# Patient Record
Sex: MALE | Race: BLACK OR AFRICAN AMERICAN | Employment: OTHER | URBAN - METROPOLITAN AREA
[De-identification: names, ages, dates, MRNs, and addresses within clinical notes are randomized per-mention and may not be internally consistent; named-entity substitution may affect disease eponyms.]

---

## 2017-01-12 ENCOUNTER — OFFICE VISIT (OUTPATIENT)
Dept: ORTHOPEDIC SURGERY | Age: 82
End: 2017-01-12

## 2017-01-12 VITALS
HEART RATE: 56 BPM | HEIGHT: 71 IN | DIASTOLIC BLOOD PRESSURE: 47 MMHG | WEIGHT: 163 LBS | BODY MASS INDEX: 22.82 KG/M2 | OXYGEN SATURATION: 96 % | RESPIRATION RATE: 16 BRPM | SYSTOLIC BLOOD PRESSURE: 115 MMHG | TEMPERATURE: 98.1 F

## 2017-01-12 DIAGNOSIS — M48.061 SPINAL STENOSIS OF LUMBAR REGION: Primary | Chronic | ICD-10-CM

## 2017-01-12 RX ORDER — NAPROXEN 500 MG/1
500 TABLET ORAL 2 TIMES DAILY WITH MEALS
Qty: 60 TAB | Refills: 2 | Status: SHIPPED | OUTPATIENT
Start: 2017-01-12 | End: 2017-04-20 | Stop reason: SDUPTHER

## 2017-01-12 RX ORDER — GABAPENTIN 100 MG/1
100 CAPSULE ORAL 3 TIMES DAILY
Qty: 90 CAP | Refills: 2 | Status: ON HOLD | OUTPATIENT
Start: 2017-01-12 | End: 2018-02-16

## 2017-01-12 NOTE — PROGRESS NOTES
VORB ENTERED PER DR. Manjit Styles AS DOCUMENTED ON BLUE SHEET:NAPROXEN 500 MG 1 TAB PO BID WITH FOOD PRN PAIN #60 RF 2; GABAPENTIN 100 MG TID #90 RF 2.

## 2017-01-12 NOTE — PROGRESS NOTES
[address], [address]  [place]  Phone: [phone]  Fax: [phone]        Ronit Weaver  : 1934  PCP: Serene Mixon MD    PROGRESS NOTE      ASSESSMENT AND PLAN    Belkis Crocker was seen today for back pain. Diagnoses and all orders for this visit:    Spinal stenosis of lumbar region    Other orders  -     gabapentin (NEURONTIN) 100 mg capsule; Take 1 Cap by mouth three (3) times daily.  -     naproxen (NAPROSYN) 500 mg tablet; Take 1 Tab by mouth two (2) times daily (with meals). 1. Trial of Naprosyn. 2. Continue Gabapentin. May take TID if needed. 3. No indication for RICKY at present. .   4. Given care instructions for stenosis. Follow-up Disposition:  Return in about 3 months (around 2017). HISTORY OF PRESENT ILLNESS  Sierra Webster is a 80 y.o. male. Pt presents to the office for a f/u visit for spinal stenosis. Last visit he was given a trial of Gabapentin. He states that he has benefit from Gabapentin. He denies any negative side effects with Gabapentin. His back pain has been doing much better since his last office visit. He notes that he is able to ambulate fine if he leans forward. He is unsure of his standing tolerance. Pt denies any pain shooting into his BLE. He denies any paresthesias in his BLE. He denies any saddle paresthesia. Pt takes Gabapentin 100 mg BID with benefit. Can feel it wearing off during the day. Is unsure if he has ever been on Naprosyn. Pt takes Motrin qD without much benefit. Pt takes Tramadol 50 mg qD. Denies persistent fevers, chills, weight changes, neurogenic bowel or bladder symptoms. Pt denies recent ED visits or hospitalizations. Pt works in maintenance at [employer].      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 Vince Dale Pr foot/toes surgery proc unlisted      Pr total hip arthroplasty Left 6/2015   . MEDICATIONS    Current Outpatient Prescriptions   Medication Sig Dispense Refill    ibuprofen (MOTRIN) 600 mg tablet Take 1 Tab by mouth two (2) times daily as needed for Pain. 60 Tab 2    gabapentin (NEURONTIN) 100 mg capsule 1 tab PO QHS x 5 days then increase to 1 tab PO BID 60 Cap 2    traMADol (ULTRAM) 50 mg tablet Take 1 Tab by mouth every eight (8) hours as needed for Pain. Max Daily Amount: 150 mg. 60 Tab 1    tamsulosin (FLOMAX) 0.4 mg capsule Take 1 Cap by mouth daily. 90 Cap 3    amLODIPine-benazepril (LOTREL) 10-20 mg per capsule Take 1 Cap by mouth daily.  gabapentin (NEURONTIN) 100 mg capsule Take  by mouth three (3) times daily. ALLERGIES  No Known Allergies       SOCIAL HISTORY    Social History     Social History    Marital status:      Spouse name: N/A    Number of children: N/A    Years of education: N/A     Occupational History    Not on file.      Social History Main Topics    Smoking status: Former Smoker     Packs/day: 1.00     Years: 25.00     Quit date: 6/1/1990    Smokeless tobacco: Never Used    Alcohol use 0.5 oz/week     1 Shots of liquor per week    Drug use: No    Sexual activity: Not on file     Other Topics Concern    Not on file     Social History Narrative       FAMILY HISTORY  Family History   Problem Relation Age of Onset    Family history unknown: Yes       REVIEW OF SYSTEMS  Review of Systems   Constitutional: Negative for chills, fever and weight loss. Respiratory: Negative for shortness of breath. Cardiovascular: Negative for chest pain. Gastrointestinal: Negative for constipation. Negative for fecal incontinence   Genitourinary: Negative for dysuria. Negative for urinary incontinence   Musculoskeletal:        Per HPI   Skin: Negative for rash. Neurological: Negative for dizziness, tingling, tremors, focal weakness and headaches. Endo/Heme/Allergies: Does not bruise/bleed easily. Psychiatric/Behavioral: The patient does not have insomnia. PHYSICAL EXAMINATION  Visit Vitals    /47    Pulse (!) 56    Temp 98.1 °F (36.7 °C) (Oral)    Resp 16    Ht 5' 11\" (1.803 m)    Wt 163 lb (73.9 kg)    SpO2 96%    BMI 22.73 kg/m2         Accompanied by self. Constitutional:  Well developed, well nourished, in no acute distress. Psychiatric: Affect and mood are appropriate. Integumentary: No rashes or abrasions noted on exposed areas. Cardiovascular/Peripheral Vascular: Intact l pulses. No peripheral edema is noted. Lymphatic:  No evidence of lymphedema. No cervical lymphadenopathy. SPINE/MUSCULOSKELETAL EXAM      Lumbar spine:  No rash, ecchymosis, or gross obliquity. No fasciculations. No focal atrophy is noted. Tenderness to palpation L4-5. No tenderness to palpation at the sciatic notch. SI joints non-tender. Trochanters non tender. Sensation grossly intact to light touch. Positive shopping cart sign. MOTOR:       Hip Flex  Quads Hamstrings Ankle DF EHL Ankle PF   Right +4/5 +4/5 +4/5 +4/5 +4/5 +4/5   Left +4/5 +4/5 +4/5 +4/5 +4/5 +4/5       Straight Leg raise negative. Hamstring tightness bilaterally. Ambulation without assistive device. FWB. Ambulation forward flexed.        Written by Annie Ramos, as dictated by Karyle Pringle, MD.    Mr. Triny Fischer may have a reminder for a \"due or due soon\" health maintenance. I have asked that he contact his primary care provider for follow-up on this health maintenance.

## 2017-01-12 NOTE — PATIENT INSTRUCTIONS
CosmosID Activation    Thank you for requesting access to CosmosID. Please follow the instructions below to securely access and download your online medical record. CosmosID allows you to send messages to your doctor, view your test results, renew your prescriptions, schedule appointments, and more. How Do I Sign Up? 1. In your internet browser, go to [email or web address]  2. Click on the First Time User? Click Here link in the Sign In box. You will be redirect to the New Member Sign Up page. 3. Enter your CosmosID Access Code exactly as it appears below. You will not need to use this code after youve completed the sign-up process. If you do not sign up before the expiration date, you must request a new code. CosmosID Access Code: [email or web address]  Expires: 2017 10:23 AM (This is the date your CosmosID access code will )    4. Enter the last four digits of your Social Security Number (xxxx) and Date of Birth (mm/dd/yyyy) as indicated and click Submit. You will be taken to the next sign-up page. 5. Create a CosmosID ID. This will be your CosmosID login ID and cannot be changed, so think of one that is secure and easy to remember. 6. Create a CosmosID password. You can change your password at any time. 7. Enter your Password Reset Question and Answer. This can be used at a later time if you forget your password. 8. Enter your e-mail address. You will receive e-mail notification when new information is available in [address] 9. Click Sign Up. You can now view and download portions of your medical record. 10. Click the Download Summary menu link to download a portable copy of your medical information. Additional Information    If you have questions, please visit the Frequently Asked Questions section of the CosmosID website at [email or web address] Remember, CosmosID is NOT to be used for urgent needs. For medical emergencies, dial 911.          Lumbar Stenosis: Care Instructions  Your Care Instructions    Stenosis in the spine is a narrowing of the canal that is around the spinal cord and nerve roots in your back. It can happen as part of aging. Sometimes bone and other tissue grow into this canal and press on the spinal nerves. This can cause pain, numbness, and weakness. When it happens in the lower part of your back, it is called lumbar stenosis. Lumbar stenosis can cause problems in the legs, feet, and rear end (buttocks). You may be able to relieve symptoms of lumbar stenosis with pain medicine. Your doctor may suggest physical therapy and exercises to keep your spine strong and flexible. Some people try cortisone shots to reduce swelling. If pain and numbness in your legs are still so bad that you cannot do your normal activities, you may need surgery. Follow-up care is a key part of your treatment and safety. Be sure to make and go to all appointments, and call your doctor if you are having problems. It's also a good idea to know your test results and keep a list of the medicines you take. How can you care for yourself at home? · Take an over-the-counter pain medicine, such as acetaminophen (Tylenol), ibuprofen (Advil, Motrin), or naproxen (Aleve). Read and follow all instructions on the label. · Do not take two or more pain medicines at the same time unless the doctor told you to. Many pain medicines have acetaminophen, which is Tylenol. Too much acetaminophen (Tylenol) can be harmful. · Stay at a healthy weight. Being overweight puts extra strain on your spine. · Change positions often when you sit or stand. This can ease pain. For example, lean forward. This may reduce pressure on the spinal cord and its nerves. · Avoid doing things that make your symptoms worse. Walking downhill and standing for a long time may cause pain. · Stretch and strengthen your back muscles as your doctor or physical therapist recommends.  If your doctor says it is okay to do them, these exercises may help. ¨ Lie on your back with your knees bent. Gently pull one bent knee to your chest. Put that foot back on the floor, and then pull the other knee to your chest.  ¨ Do pelvic tilts. Lie on your back with your knees bent. Tighten your stomach muscles. Pull your belly button (navel) in and up toward your ribs. You should feel like your back is pressing to the floor and your hips and pelvis are slightly lifting off the floor. Hold for 6 seconds while breathing smoothly. ¨ Stand with your back flat against a wall. Slowly slide down until your knees are slightly bent. Hold for 10 seconds, then slide back up the wall. · Remove or change anything in your house that may cause you to fall. Keep walkways clear of clutter, electrical cords, and throw rugs. When should you call for help? Call 911 anytime you think you may need emergency care. For example, call if:  · You are unable to move a leg at all. Call your doctor now or seek immediate medical care if:  · You have new or worse symptoms in your legs, belly, or buttocks. Symptoms may include:  ¨ Numbness or tingling. ¨ Weakness. ¨ Pain. · You lose bladder or bowel control. Watch closely for changes in your health, and be sure to contact your doctor if:  · You are not getting better as expected. Where can you learn more? Go to http://em-gus.info/. Dow Sandhoff in the search box to learn more about \"Lumbar Stenosis: Care Instructions. \"  Current as of: May 23, 2016  Content Version: 11.1  © 3751-9719 [employer]. Care instructions adapted under license by [employer] (which disclaims liability or warranty for this information). If you have questions about a medical condition or this instruction, always ask your healthcare professional. [place] [address] any warranty or liability for your use of this information.

## 2017-01-12 NOTE — MR AVS SNAPSHOT
Visit Information Date & Time Provider Department Dept. Phone Encounter #  
 1/12/2017 10:50 AM Bryan Velarde MD [facility] [phone] [id number] Follow-up Instructions Return in about 3 months (around 4/12/2017). Your Appointments 6/23/2017  9:00 AM  
ESTABLISHED PATIENT with Nora Loomis MD  
[facility] Appt Note: annual  
 [address] 
[address] [place] [address]  
[address] [place] [address] Upcoming Health Maintenance Date Due DTaP/Tdap/Td series (1 - Tdap) 1/27/1955 ZOSTER VACCINE AGE 60> 1/27/1994 GLAUCOMA SCREENING Q2Y 1/27/1999 Pneumococcal 65+ Low/Medium Risk (1 of 2 - PCV13) 1/27/1999 MEDICARE YEARLY EXAM 1/27/1999 INFLUENZA AGE 9 TO ADULT 8/1/2016 Allergies as of 1/12/2017  Review Complete On: 1/12/2017 By: Bryan Velarde MD  
 No Known Allergies Current Immunizations  Never Reviewed No immunizations on file. Not reviewed this visit You Were Diagnosed With   
  
 Codes Comments Spinal stenosis of lumbar region    -  Primary ICD-10-CM: M48.06 
ICD-9-CM: 724.02 Vitals BP Pulse Temp Resp Height(growth percentile) Weight(growth percentile) 115/47 (!) 56 98.1 °F (36.7 °C) (Oral) 16 5' 11\" (1.803 m) 163 lb (73.9 kg) SpO2 BMI Smoking Status 96% 22.73 kg/m2 Former Smoker BMI and BSA Data Body Mass Index Body Surface Area  
 22.73 kg/m 2 1.92 m 2 Preferred Pharmacy Pharmacy Name Phone [address], [address] [address] Your Updated Medication List  
  
   
This list is accurate as of: 1/12/17 11:49 AM.  Always use your most recent med list.  
  
  
  
  
 * gabapentin 100 mg capsule Commonly known as:  NEURONTIN Take  by mouth three (3) times daily. * gabapentin 100 mg capsule Commonly known as:  NEURONTIN  
1 tab PO QHS x 5 days then increase to 1 tab PO BID  
  
 ibuprofen 600 mg tablet Commonly known as:  MOTRIN Take 1 Tab by mouth two (2) times daily as needed for Pain. LOTREL 10-20 mg per capsule Generic drug:  amLODIPine-benazepril Take 1 Cap by mouth daily. tamsulosin 0.4 mg capsule Commonly known as:  FLOMAX Take 1 Cap by mouth daily. traMADol 50 mg tablet Commonly known as:  ULTRAM  
Take 1 Tab by mouth every eight (8) hours as needed for Pain. Max Daily Amount: 150 mg.  
  
 * Notice: This list has 2 medication(s) that are the same as other medications prescribed for you. Read the directions carefully, and ask your doctor or other care provider to review them with you. Follow-up Instructions Return in about 3 months (around 2017). Patient Instructions [employer] Activation Thank you for requesting access to [employer]. Please follow the instructions below to securely access and download your online medical record. [employer] allows you to send messages to your doctor, view your test results, renew your prescriptions, schedule appointments, and more. How Do I Sign Up? 1. In your internet browser, go to [email or web address] 
2. Click on the First Time User? Click Here link in the Sign In box. You will be redirect to the New Member Sign Up page. 3. Enter your [employer] Access Code exactly as it appears below. You will not need to use this code after youve completed the sign-up process. If you do not sign up before the expiration date, you must request a new code. [employer] Access Code: [email or web address] Expires: 2017 10:23 AM (This is the date your [employer] access code will ) 4. Enter the last four digits of your Social Security Number (xxxx) and Date of Birth (mm/dd/yyyy) as indicated and click Submit. You will be taken to the next sign-up page. 5. Create a Access Closuret ID. This will be your Azooo login ID and cannot be changed, so think of one that is secure and easy to remember. 6. Create a Azooo password. You can change your password at any time. 7. Enter your Password Reset Question and Answer. This can be used at a later time if you forget your password. 8. Enter your e-mail address. You will receive e-mail notification when new information is available in [address] 9. Click Sign Up. You can now view and download portions of your medical record. 10. Click the Download Summary menu link to download a portable copy of your medical information. Additional Information If you have questions, please visit the Frequently Asked Questions section of the Azooo website at [email or web address] [email or web address]/. Remember, Azooo is NOT to be used for urgent needs. For medical emergencies, dial 911. Lumbar Stenosis: Care Instructions Your Care Instructions Stenosis in the spine is a narrowing of the canal that is around the spinal cord and nerve roots in your back. It can happen as part of aging. Sometimes bone and other tissue grow into this canal and press on the spinal nerves. This can cause pain, numbness, and weakness. When it happens in the lower part of your back, it is called lumbar stenosis. Lumbar stenosis can cause problems in the legs, feet, and rear end (buttocks). You may be able to relieve symptoms of lumbar stenosis with pain medicine. Your doctor may suggest physical therapy and exercises to keep your spine strong and flexible. Some people try cortisone shots to reduce swelling. If pain and numbness in your legs are still so bad that you cannot do your normal activities, you may need surgery. Follow-up care is a key part of your treatment and safety. Be sure to make and go to all appointments, and call your doctor if you are having problems.  It's also a good idea to know your test results and keep a list of the medicines you take. How can you care for yourself at home? · Take an over-the-counter pain medicine, such as acetaminophen (Tylenol), ibuprofen (Advil, Motrin), or naproxen (Aleve). Read and follow all instructions on the label. · Do not take two or more pain medicines at the same time unless the doctor told you to. Many pain medicines have acetaminophen, which is Tylenol. Too much acetaminophen (Tylenol) can be harmful. · Stay at a healthy weight. Being overweight puts extra strain on your spine. · Change positions often when you sit or stand. This can ease pain. For example, lean forward. This may reduce pressure on the spinal cord and its nerves. · Avoid doing things that make your symptoms worse. Walking downhill and standing for a long time may cause pain. · Stretch and strengthen your back muscles as your doctor or physical therapist recommends. If your doctor says it is okay to do them, these exercises may help. ¨ Lie on your back with your knees bent. Gently pull one bent knee to your chest. Put that foot back on the floor, and then pull the other knee to your chest. 
¨ Do pelvic tilts. Lie on your back with your knees bent. Tighten your stomach muscles. Pull your belly button (navel) in and up toward your ribs. You should feel like your back is pressing to the floor and your hips and pelvis are slightly lifting off the floor. Hold for 6 seconds while breathing smoothly. ¨ Stand with your back flat against a wall. Slowly slide down until your knees are slightly bent. Hold for 10 seconds, then slide back up the wall. · Remove or change anything in your house that may cause you to fall. Keep walkways clear of clutter, electrical cords, and throw rugs. When should you call for help? Call 911 anytime you think you may need emergency care. For example, call if: 
· You are unable to move a leg at all. Call your doctor now or seek immediate medical care if: · You have new or worse symptoms in your legs, belly, or buttocks. Symptoms may include: ¨ Numbness or tingling. ¨ Weakness. ¨ Pain. · You lose bladder or bowel control. Watch closely for changes in your health, and be sure to contact your doctor if: 
· You are not getting better as expected. Where can you learn more? Go to http://em-gus.info/. Vernice Dance in the search box to learn more about \"Lumbar Stenosis: Care Instructions. \" Current as of: May 23, 2016 Content Version: 11.1 © 4359-6785 DealitLive.[employer]. Care instructions adapted under license by [employer] (which disclaims liability or warranty for this information). If you have questions about a medical condition or this instruction, always ask your healthcare professional. [address] any warranty or liability for your use of this information. Introducing [facility]! Edgar Roca introduces Digital Folio patient portal. Now you can access parts of your medical record, email your doctor's office, and request medication refills online. 1. In your internet browser, go to [email or web address] 2. Click on the First Time User? Click Here link in the Sign In box. You will see the New Member Sign Up page. 3. Enter your Digital Folio Access Code exactly as it appears below. You will not need to use this code after youve completed the sign-up process. If you do not sign up before the expiration date, you must request a new code. · Digital Folio Access Code: [email or web address] Expires: 4/12/2017 10:23 AM 
 
4. Enter the last four digits of your Social Security Number (xxxx) and Date of Birth (mm/dd/yyyy) as indicated and click Submit. You will be taken to the next sign-up page. 5. Create a Digital Folio ID. This will be your Digital Folio login ID and cannot be changed, so think of one that is secure and easy to remember. 6. Create a LMN-1 password. You can change your password at any time. 7. Enter your Password Reset Question and Answer. This can be used at a later time if you forget your password. 8. Enter your e-mail address. You will receive e-mail notification when new information is available in [address] 9. Click Sign Up. You can now view and download portions of your medical record. 10. Click the Download Summary menu link to download a portable copy of your medical information. If you have questions, please visit the Frequently Asked Questions section of the LMN-1 website. Remember, LMN-1 is NOT to be used for urgent needs. For medical emergencies, dial 911. Now available from your iPhone and Android! Please provide this summary of care documentation to your next provider. Your primary care clinician is listed as [facility]. If you have any questions after today's visit, please call [phone].

## 2017-04-13 RX ORDER — TRAMADOL HYDROCHLORIDE 50 MG/1
50 TABLET ORAL
Qty: 60 TAB | Refills: 1 | OUTPATIENT
Start: 2017-04-13 | End: 2017-07-25 | Stop reason: SDUPTHER

## 2017-04-13 NOTE — TELEPHONE ENCOUNTER
PHONE IN RX    Last Visit: 08/26/2016 with TINA Mccullough    Next Appointment: 05/05/2017 with TINA Mccullough   Previous Refill Encounters: 12/07/2016 per TINA Mccullough #[id number] with 1 refill     Requested Prescriptions     Pending Prescriptions Disp Refills    traMADol (ULTRAM) 50 mg tablet 60 Tab 1     Sig: Take 1 Tab by mouth every eight (8) hours as needed for Pain. Max Daily Amount: 150 mg.

## 2017-04-13 NOTE — TELEPHONE ENCOUNTER
Pt came by  office to req a refill of tramadol.   [facility] and call pt to advise

## 2017-04-20 ENCOUNTER — OFFICE VISIT (OUTPATIENT)
Dept: ORTHOPEDIC SURGERY | Age: 82
End: 2017-04-20

## 2017-04-20 VITALS
SYSTOLIC BLOOD PRESSURE: 118 MMHG | HEIGHT: 71 IN | BODY MASS INDEX: 22.73 KG/M2 | TEMPERATURE: 98.3 F | RESPIRATION RATE: 18 BRPM | OXYGEN SATURATION: 97 % | DIASTOLIC BLOOD PRESSURE: 70 MMHG | WEIGHT: 162.4 LBS | HEART RATE: 72 BPM

## 2017-04-20 DIAGNOSIS — M48.061 SPINAL STENOSIS OF LUMBAR REGION: Primary | Chronic | ICD-10-CM

## 2017-04-20 RX ORDER — NAPROXEN 500 MG/1
500 TABLET ORAL 2 TIMES DAILY WITH MEALS
Qty: 60 TAB | Refills: 2 | Status: SHIPPED | OUTPATIENT
Start: 2017-04-20 | End: 2017-10-31 | Stop reason: SDUPTHER

## 2017-04-20 NOTE — MR AVS SNAPSHOT
Visit Information Date & Time Provider Department Dept. Phone Encounter #  
 4/20/2017  3:10 PM Dilma Barajas  [facility], [address] [facility] [phone] [id number] Follow-up Instructions Return in about 3 months (around 7/20/2017) for routine med f/u. Your Appointments 5/5/2017  9:40 AM  
Follow Up with Laila Hummel PA-C  
[facility] ([facility]) Appt Note: bilat hip fu  
 [address], [address] [facility]  
[phone] [address] [facility], [facility] [address]  
  
    
 7/20/2017  9:45 AM  
Follow Up with Dilma Barajas MD  
[facility]) Appt Note: 3mo fu  
 [address] [facility] [address]  
[address] [address]  
  
    
  
 6/22/2017 10:45 AM  
Any with Cesar Weiner MD  
[facility] ([facility]) Appt Note: annual  
 [address] 
[address] [facility] [address]  
[address] [facility] [address] [facility] [address] [facility] [address] Upcoming Health Maintenance Date Due DTaP/Tdap/Td series (1 - Tdap) 1/27/1955 ZOSTER VACCINE AGE 60> 1/27/1994 GLAUCOMA SCREENING Q2Y 1/27/1999 Pneumococcal 65+ Low/Medium Risk (1 of 2 - PCV13) 1/27/1999 MEDICARE YEARLY EXAM 1/27/1999 INFLUENZA AGE 9 TO ADULT 8/1/2016 Allergies as of 4/20/2017  Review Complete On: 4/20/2017 By: [address], MD  
 No Known Allergies Current Immunizations  Never Reviewed No immunizations on file. Not reviewed this visit You Were Diagnosed With   
  
 Codes Comments Spinal stenosis of lumbar region    -  Primary ICD-10-CM: M48.06 
ICD-9-CM: 724.02 Vitals BP Pulse Temp Resp Height(growth percentile) Weight(growth percentile) 118/70 72 98.3 °F (36.8 °C) (Oral) 18 5' 11\" (1.803 m) 162 lb 6.4 oz (73.7 kg) SpO2 BMI Smoking Status 97% 22.65 kg/m2 Former Smoker BMI and BSA Data Body Mass Index Body Surface Area  
 22.65 kg/m 2 1.92 m 2 Preferred Pharmacy Pharmacy Name Phone [phone] [facility], [phone] Lambert Your Updated Medication List  
  
   
This list is accurate as of: 4/20/17  3:41 PM.  Always use your most recent med list.  
  
  
  
  
 * gabapentin 100 mg capsule Commonly known as:  NEURONTIN Take  by mouth three (3) times daily. * gabapentin 100 mg capsule Commonly known as:  NEURONTIN Take 1 Cap by mouth three (3) times daily. LOTREL 10-20 mg per capsule Generic drug:  amLODIPine-benazepril Take 1 Cap by mouth daily. naproxen 500 mg tablet Commonly known as:  NAPROSYN Take 1 Tab by mouth two (2) times daily (with meals). tamsulosin 0.4 mg capsule Commonly known as:  FLOMAX Take 1 Cap by mouth daily. traMADol 50 mg tablet Commonly known as:  ULTRAM  
Take 1 Tab by mouth every eight (8) hours as needed for Pain. Max Daily Amount: 150 mg.  
  
 * Notice: This list has 2 medication(s) that are the same as other medications prescribed for you. Read the directions carefully, and ask your doctor or other care provider to review them with you. Prescriptions Sent to Pharmacy Refills  
 naproxen (NAPROSYN) 500 mg tablet 2 Sig: Take 1 Tab by mouth two (2) times daily (with meals). Class: Normal  
 Pharmacy: [id number] [facility], [address] [facility] #: [phone] Route: Oral  
  
Follow-up Instructions Return in about 3 months (around 7/20/2017) for routine med f/u. Patient Instructions Hamstring Strain: Rehab Exercises Your Care Instructions Here are some examples of typical rehabilitation exercises for your condition. Start each exercise slowly. Ease off the exercise if you start to have pain. Your doctor or physical therapist will tell you when you can start these exercises and which ones will work best for you. How to do the exercises Hamstring set (heel dig) 1. Sit with your affected leg bent. Your good leg should be straight and supported on the floor. 2. Tighten the muscles on the back of your bent leg (hamstring) by pressing your heel into the floor. 3. Hold for about 6 seconds, and then rest for up to 10 seconds. 4. Repeat 8 to 12 times. Hamstring curl 1. Lie on your stomach with your knees straight. Place a pillow under your stomach. If your kneecap is uncomfortable, roll up a washcloth and put it under your leg just above your kneecap. 2. Lift the foot of your affected leg by bending your knee so that you bring your foot up toward your buttock. If this motion hurts, try it without bending your knee quite as far. This may help you avoid any painful motion. 3. Slowly move your leg up and down. 4. Repeat 8 to 12 times. When you can do this exercise with ease and no pain, add some resistance. To do this: · Tie the ends of an exercise band together to form a loop. Attach one end of the loop to a secure object or shut a door on it to hold it in place. (Or you can have someone hold one end of the loop to provide resistance.) · Loop the other end of the exercise band around the lower part of your affected leg. · Repeat steps 1 through 4, slowly pulling back on the exercise band with your leg. Hip extension 1. Stand facing a wall with your hands on the wall at about chest level. 2. Keeping the knee of your affected leg straight, kick that leg straight back behind you. 3. Relax, and lower your leg back to the starting position. 4. Repeat 8 to 12 times. When you can do this exercise with ease and no pain, add some resistance. To do this: · Tie the ends of an exercise band together to form a loop. Attach one end of the loop to a secure object or shut a door on it to hold it in place. (Or you can have someone hold one end of the loop to provide resistance.) · Loop the other end of the exercise band around the lower part of your affected leg. · Repeat steps 1 through 4, slowly pulling back on the exercise band with your leg. Hamstring wall stretch 1. Lie on your back in a doorway, with your good leg through the open door. 2. Slide your affected leg up the wall to straighten your knee. You should feel a gentle stretch down the back of your leg. ¨ Do not arch your back. ¨ Do not bend either knee. ¨ Keep one heel touching the floor and the other heel touching the wall. Do not point your toes. 3. Hold the stretch for at least 1 minute to begin. Then try to lengthen the time you hold the stretch to as long as 6 minutes. 4. Repeat 2 to 4 times. If you do not have a place to do this exercise in a doorway, there is another way to do it: 1. Lie on your back, and bend the knee of your affected leg. 2. Loop a towel under the ball and toes of that foot, and hold the ends of the towel in your hands. 3. Straighten your knee, and slowly pull back on the towel. You should feel a gentle stretch down the back of your leg. 4. Hold the stretch for 15 to 30 seconds. Or even better, hold the stretch for 1 minute if you can. 5. Repeat 2 to 4 times. Calf stretch 1. Stand facing a wall with your hands on the wall at about eye level. Put your affected leg about a step behind your other leg. 2. Keeping your back leg straight and your back heel on the floor, bend your front knee and gently bring your hip and chest toward the wall until you feel a stretch in the calf of your back leg. 3. Hold the stretch for 15 to 30 seconds. 4. Repeat 2 to 4 times. 5. Repeat steps 1 through 4, but this time keep your back knee bent. Single-leg balance 1. Stand on a flat surface with your arms stretched out to your sides like you are making the letter \"T. \" Then lift your good leg off the floor, bending it at the knee. If you are not steady on your feet, use one hand to hold on to a chair, counter, or wall. 2. Standing on your affected leg, keep that knee straight. Try to balance on that leg for up to 30 seconds. Then rest for up to 10 seconds. 3. Repeat 6 to 8 times. 4. When you can balance on your affected leg for 30 seconds with your eyes open, try to balance on it with your eyes closed. When you can do this exercise with your eyes closed for 30 seconds and with ease and no pain, try standing on a pillow or piece of foam, and repeat steps 1 through 4. Follow-up care is a key part of your treatment and safety. Be sure to make and go to all appointments, and call your doctor if you are having problems. It's also a good idea to know your test results and keep a list of the medicines you take. Where can you learn more? Go to http://em-gus.info/. Enter [phone] in the search box to learn more about \"Hamstring Strain: Rehab Exercises. \" Current as of: May 23, 2016 Content Version: 11.2 © 1121-2523 [employer]. Care instructions adapted under license by [employer] (which disclaims liability or warranty for this information). If you have questions about a medical condition or this instruction, always ask your healthcare professional. [place] [address] any warranty or liability for your use of this information. Introducing [facility]! [employer] introduces [employer] patient portal. Now you can access parts of your medical record, email your doctor's office, and request medication refills online. 1. In your internet browser, go to [email or web address] 2. Click on the First Time User? Click Here link in the Sign In box. You will see the New Member Sign Up page. 3. Enter your Visuu Access Code exactly as it appears below. You will not need to use this code after youve completed the sign-up process. If you do not sign up before the expiration date, you must request a new code. · Visuu Access Code: [email or web address] Expires: 7/19/2017  3:41 PM 
 
4. Enter the last four digits of your Social Security Number (xxxx) and Date of Birth (mm/dd/yyyy) as indicated and click Submit. You will be taken to the next sign-up page. 5. Create a Visuu ID. This will be your Visuu login ID and cannot be changed, so think of one that is secure and easy to remember. 6. Create a Visuu password. You can change your password at any time. 7. Enter your Password Reset Question and Answer. This can be used at a later time if you forget your password. 8. Enter your e-mail address. You will receive e-mail notification when new information is available in [address] 9. Click Sign Up. You can now view and download portions of your medical record. 10. Click the Download Summary menu link to download a portable copy of your medical information. If you have questions, please visit the Frequently Asked Questions section of the Visuu website. Remember, Visuu is NOT to be used for urgent needs. For medical emergencies, dial 911. Now available from your iPhone and Android! Please provide this summary of care documentation to your next provider. Your primary care clinician is listed as [facility]. If you have any questions after today's visit, please call [phone].

## 2017-04-20 NOTE — PROGRESS NOTES
[address]  [place] [address]  Phone: [phone]  Fax: [phone]        Kole Sheridan  : 1934  PCP: Sanchez Sharma MD    PROGRESS NOTE      ASSESSMENT AND PLAN    Delmar Sánchez was seen today for back pain. Diagnoses and all orders for this visit:    Spinal stenosis of lumbar region  -     naproxen (NAPROSYN) 500 mg tablet; Take 1 Tab by mouth two (2) times daily (with meals). 1. Continue Naprosyn (or Ibuprofen-NOT BOTH)  and Gabapentin. 2. No indications for surgery or injections at this time. 3. Advised to stay active as tolerated. 4. Given home exercises. Follow-up Disposition:  Return in about 3 months (around 2017) for routine med f/u. HISTORY OF PRESENT ILLNESS  Cy Trinidad is a 80 y.o. male. Pt presents to the office for a f/u visit for back pain, stenosis. Last visit he was given a trial of Naprosyn. Pt notes that he has benefit from Naprosyn. He takes Naprosyn every day without GI upset. Also taking IBu. Pt continues to have back pain. He states that he usually does well with his back pain until he walks and stands. He notices that he will be hunched forward after standing and walking. No saddle paresthesia. Pt takes Gabapentin QAM without somnolence. He takes Tramadol 50 mg QAM. Denies persistent fevers, chills, weight changes, neurogenic bowel or bladder symptoms. Pt denies recent ED visits or hospitalizations. Pt works in maintenance at [employer]      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 Bridget Crisostomo Left 6/2015   . MEDICATIONS    Current Outpatient Prescriptions   Medication Sig Dispense Refill    traMADol (ULTRAM) 50 mg tablet Take 1 Tab by mouth every eight (8) hours as needed for Pain. Max Daily Amount: 150 mg. 60 Tab 1    gabapentin (NEURONTIN) 100 mg capsule Take 1 Cap by mouth three (3) times daily. 90 Cap 2    ibuprofen (MOTRIN) 600 mg tablet Take 1 Tab by mouth two (2) times daily as needed for Pain. 60 Tab 2    tamsulosin (FLOMAX) 0.4 mg capsule Take 1 Cap by mouth daily. 90 Cap 3    amLODIPine-benazepril (LOTREL) 10-20 mg per capsule Take 1 Cap by mouth daily.  naproxen (NAPROSYN) 500 mg tablet Take 1 Tab by mouth two (2) times daily (with meals). 60 Tab 2    gabapentin (NEURONTIN) 100 mg capsule Take  by mouth three (3) times daily. ALLERGIES  No Known Allergies       SOCIAL HISTORY    Social History     Social History    Marital status:      Spouse name: N/A    Number of children: N/A    Years of education: N/A     Occupational History    Not on file. Social History Main Topics    Smoking status: Former Smoker     Packs/day: 1.00     Years: 25.00     Quit date: 6/1/1990    Smokeless tobacco: Never Used    Alcohol use 0.5 oz/week     1 Shots of liquor per week    Drug use: No    Sexual activity: Not on file     Other Topics Concern    Not on file     Social History Narrative       FAMILY HISTORY  Family History   Problem Relation Age of Onset    Family history unknown: Yes       REVIEW OF SYSTEMS  Review of Systems   Constitutional: Negative for chills, fever and weight loss. Respiratory: Negative for shortness of breath. Cardiovascular: Negative for chest pain. Gastrointestinal: Negative for constipation. Negative for fecal incontinence   Genitourinary: Negative for dysuria. Negative for urinary incontinence   Musculoskeletal:        Per HPI   Skin: Negative for rash. Neurological: Negative for dizziness, tingling, tremors, focal weakness and headaches. Endo/Heme/Allergies: Does not bruise/bleed easily. Psychiatric/Behavioral: The patient does not have insomnia. PHYSICAL EXAMINATION  Visit Vitals    /70    Pulse 72    Temp 98.3 °F (36.8 °C) (Oral)    Resp 18    Ht 5' 11\" (1.803 m)    Wt 162 lb 6.4 oz (73.7 kg)    SpO2 97%    BMI 22.65 kg/m2         Accompanied by self. Constitutional:  Well developed, well nourished, in no acute distress. Psychiatric: Affect and mood are appropriate. Integumentary: No rashes or abrasions noted on exposed areas. Cardiovascular/Peripheral Vascular: Intact l pulses. No peripheral edema is noted. Lymphatic:  No evidence of lymphedema. No cervical lymphadenopathy. SPINE/MUSCULOSKELETAL EXAM      Lumbar spine:  No rash, ecchymosis, or gross obliquity. No fasciculations. No focal atrophy is noted. Tenderness to palpation L5-S1. No tenderness to palpation at the sciatic notch. SI joints non-tender. Trochanters non tender. Sensation grossly intact to light touch. MOTOR:       Hip Flex  Quads Hamstrings Ankle DF EHL Ankle PF   Right +4/5 +4/5 +4/5 +4/5 +4/5 +4/5   Left +4/5 +4/5 +4/5 +4/5 +4/5 +4/5       Straight Leg raise negative. Hamstring tightness bilaterally. Ambulation without assistive device. FWB. Shuffling gait. Forward flexed. Written by Xavier Anderson, as dictated by Megan Sharma MD.    I, Dr. Megan Sharma MD, confirm that all documentation is accurate. Mr. Alexis Moura may have a reminder for a \"due or due soon\" health maintenance. I have asked that he contact his primary care provider for follow-up on this health maintenance.

## 2017-04-20 NOTE — PATIENT INSTRUCTIONS
Hamstring Strain: Rehab Exercises  Your Care Instructions  Here are some examples of typical rehabilitation exercises for your condition. Start each exercise slowly. Ease off the exercise if you start to have pain. Your doctor or physical therapist will tell you when you can start these exercises and which ones will work best for you. How to do the exercises  Hamstring set (heel dig)    1. Sit with your affected leg bent. Your good leg should be straight and supported on the floor. 2. Tighten the muscles on the back of your bent leg (hamstring) by pressing your heel into the floor. 3. Hold for about 6 seconds, and then rest for up to 10 seconds. 4. Repeat 8 to 12 times. Hamstring curl    1. Lie on your stomach with your knees straight. Place a pillow under your stomach. If your kneecap is uncomfortable, roll up a washcloth and put it under your leg just above your kneecap. 2. Lift the foot of your affected leg by bending your knee so that you bring your foot up toward your buttock. If this motion hurts, try it without bending your knee quite as far. This may help you avoid any painful motion. 3. Slowly move your leg up and down. 4. Repeat 8 to 12 times. When you can do this exercise with ease and no pain, add some resistance. To do this:  · Tie the ends of an exercise band together to form a loop. Attach one end of the loop to a secure object or shut a door on it to hold it in place. (Or you can have someone hold one end of the loop to provide resistance.)  · Loop the other end of the exercise band around the lower part of your affected leg. · Repeat steps 1 through 4, slowly pulling back on the exercise band with your leg. Hip extension    1. Stand facing a wall with your hands on the wall at about chest level. 2. Keeping the knee of your affected leg straight, kick that leg straight back behind you. 3. Relax, and lower your leg back to the starting position. 4. Repeat 8 to 12 times.   When you can do this exercise with ease and no pain, add some resistance. To do this:  · Tie the ends of an exercise band together to form a loop. Attach one end of the loop to a secure object or shut a door on it to hold it in place. (Or you can have someone hold one end of the loop to provide resistance.)  · Loop the other end of the exercise band around the lower part of your affected leg. · Repeat steps 1 through 4, slowly pulling back on the exercise band with your leg. Hamstring wall stretch    1. Lie on your back in a doorway, with your good leg through the open door. 2. Slide your affected leg up the wall to straighten your knee. You should feel a gentle stretch down the back of your leg. ¨ Do not arch your back. ¨ Do not bend either knee. ¨ Keep one heel touching the floor and the other heel touching the wall. Do not point your toes. 3. Hold the stretch for at least 1 minute to begin. Then try to lengthen the time you hold the stretch to as long as 6 minutes. 4. Repeat 2 to 4 times. If you do not have a place to do this exercise in a doorway, there is another way to do it:  1. Lie on your back, and bend the knee of your affected leg. 2. Loop a towel under the ball and toes of that foot, and hold the ends of the towel in your hands. 3. Straighten your knee, and slowly pull back on the towel. You should feel a gentle stretch down the back of your leg. 4. Hold the stretch for 15 to 30 seconds. Or even better, hold the stretch for 1 minute if you can. 5. Repeat 2 to 4 times. Calf stretch    1. Stand facing a wall with your hands on the wall at about eye level. Put your affected leg about a step behind your other leg. 2. Keeping your back leg straight and your back heel on the floor, bend your front knee and gently bring your hip and chest toward the wall until you feel a stretch in the calf of your back leg. 3. Hold the stretch for 15 to 30 seconds. 4. Repeat 2 to 4 times.   5. Repeat steps 1 through 4, but this time keep your back knee bent. Single-leg balance    1. Stand on a flat surface with your arms stretched out to your sides like you are making the letter \"T. \" Then lift your good leg off the floor, bending it at the knee. If you are not steady on your feet, use one hand to hold on to a chair, counter, or wall. 2. Standing on your affected leg, keep that knee straight. Try to balance on that leg for up to 30 seconds. Then rest for up to 10 seconds. 3. Repeat 6 to 8 times. 4. When you can balance on your affected leg for 30 seconds with your eyes open, try to balance on it with your eyes closed. When you can do this exercise with your eyes closed for 30 seconds and with ease and no pain, try standing on a pillow or piece of foam, and repeat steps 1 through 4. Follow-up care is a key part of your treatment and safety. Be sure to make and go to all appointments, and call your doctor if you are having problems. It's also a good idea to know your test results and keep a list of the medicines you take. Where can you learn more? Go to http://em-gus.info/. Enter [id number] in the search box to learn more about \"Hamstring Strain: Rehab Exercises. \"  Current as of: May 23, 2016  Content Version: 11.2  © 4754-8590 [employer]. Care instructions adapted under license by [employer] (which disclaims liability or warranty for this information). If you have questions about a medical condition or this instruction, always ask your healthcare professional. [place] [address] any warranty or liability for your use of this information.

## 2017-05-05 ENCOUNTER — OFFICE VISIT (OUTPATIENT)
Dept: ORTHOPEDIC SURGERY | Age: 82
End: 2017-05-05

## 2017-05-05 VITALS
WEIGHT: 163.6 LBS | HEART RATE: 60 BPM | BODY MASS INDEX: 22.9 KG/M2 | HEIGHT: 71 IN | TEMPERATURE: 97.6 F | SYSTOLIC BLOOD PRESSURE: 138 MMHG | DIASTOLIC BLOOD PRESSURE: 59 MMHG

## 2017-05-05 DIAGNOSIS — M70.61 TROCHANTERIC BURSITIS OF RIGHT HIP: ICD-10-CM

## 2017-05-05 DIAGNOSIS — Z96.642 HISTORY OF TOTAL LEFT HIP REPLACEMENT: Primary | ICD-10-CM

## 2017-05-05 DIAGNOSIS — Z96.641 HISTORY OF TOTAL RIGHT HIP REPLACEMENT: ICD-10-CM

## 2017-05-05 RX ORDER — BETAMETHASONE SODIUM PHOSPHATE AND BETAMETHASONE ACETATE 3; 3 MG/ML; MG/ML
6 INJECTION, SUSPENSION INTRA-ARTICULAR; INTRALESIONAL; INTRAMUSCULAR; SOFT TISSUE ONCE
Qty: 1 ML | Refills: 0 | Status: CANCELLED
Start: 2017-05-05 | End: 2017-05-05

## 2017-05-05 RX ORDER — DICLOFENAC SODIUM 10 MG/G
4 GEL TOPICAL 4 TIMES DAILY
Qty: 5 EACH | Refills: 0 | Status: SHIPPED | OUTPATIENT
Start: 2017-05-05 | End: 2017-09-07 | Stop reason: SDUPTHER

## 2017-05-05 NOTE — PROGRESS NOTES
[address] [facility], [address] [facility]  [phone]           Patient: Rito Mercer                MRN: [id number]       SSN: [id number]  YOB: 1934        AGE: 80 y.o. SEX: male  Body mass index is 22.82 kg/(m^2). PCP: Vivien Goff MD  05/05/17      This office note has been dictated. REVIEW OF SYSTEMS:  Constitutional: Negative for fever, chills, weight loss and malaise/fatigue. HENT: Negative. Eyes: Negative. Respiratory: Negative. Cardiovascular: Negative. Gastrointestinal: No bowel incontinence or constipation. Genitourinary: No bladder incontinence or saddle anesthesia. Skin: Negative. Neurological: Negative. Endo/Heme/Allergies: Negative. Psychiatric/Behavioral: Negative. Musculoskeletal: As per HPI above. Past Medical History:   Diagnosis Date    Arthritis     Chronic pain     left hip and lower back    Hypertension     Left hip pain     resolved since surgery    Thromboembolus ([facility] [address] 1975    brain, [facility]    Wears glasses          Current Outpatient Prescriptions:     naproxen (NAPROSYN) 500 mg tablet, Take 1 Tab by mouth two (2) times daily (with meals). , Disp: 60 Tab, Rfl: 2    traMADol (ULTRAM) 50 mg tablet, Take 1 Tab by mouth every eight (8) hours as needed for Pain. Max Daily Amount: 150 mg., Disp: 60 Tab, Rfl: 1    gabapentin (NEURONTIN) 100 mg capsule, Take 1 Cap by mouth three (3) times daily. , Disp: 90 Cap, Rfl: 2    tamsulosin (FLOMAX) 0.4 mg capsule, Take 1 Cap by mouth daily. , Disp: 90 Cap, Rfl: 3    amLODIPine-benazepril (LOTREL) 10-20 mg per capsule, Take 1 Cap by mouth daily. , Disp: , Rfl:     gabapentin (NEURONTIN) 100 mg capsule, Take  by mouth three (3) times daily. , Disp: , Rfl:     No Known Allergies    Social History     Social History    Marital status:      Spouse name: N/A    Number of children: N/A    Years of education: N/A     Occupational History    Not on file. Social History Main Topics    Smoking status: Former Smoker     Packs/day: 1.00     Years: 25.00     Quit date: 6/1/1990    Smokeless tobacco: Never Used    Alcohol use 0.5 oz/week     1 Shots of liquor per week    Drug use: No    Sexual activity: Not on file     Other Topics Concern    Not on file     Social History Narrative       Past Surgical History:   Procedure Laterality Date    FOOT/TOES SURGERY PROC UNLISTED      HX OTHER SURGICAL  1975    brain surgery, Dr. Markie Mora Left 6/2015               We did see Mr. Alexis Moura in the office today for followup in regards to his hips. He is having a little bit of discomfort, laterally based, on the right hip.  he does have a history of bilateral hip replacements. He has done very well with his hips. He denies any groin pain and has no thigh pain. He does reports a little bit of discomfort when he is up and ambulating and a little bit of discomfort when he lays on his right side at night. He is using no assistive devices for ambulation. PHYSICAL EXAMINATION: In general, the patient is alert and oriented x 3 and is in no acute distress. The patient is well-developed and well-nourished with a normal affect. The patient is afebrile. HEENT:  Head is normocephalic and atraumatic. Pupils are equally round and reactive to light and accommodation. Extraocular eye movements are intact. Neck is supple. Trachea is midline. No JVD is present. Breathing is nonlabored. Examination of the lower extremities reveals good range of motion of the hips without reproduction of his symptoms. The surgical wounds are healed nicely. He does have a little discomfort to palpation of the trochanteric bursa on the right side but none on the left. Strength is symmetric bilaterally in to the lower extremities. Abduction strength has come along very nicely.      RADIOGRAPHS:  Review of radiographs show total knee components to be well-fixed. He does have impaction allograft at the acetabulum bilaterally, which is incorporated nicely. ASSESSMENT:      1. Bilateral hip replacements doing well. 2. Trochanteric bursitis of the right hip. PLAN:  At this point, we discussed treatment options. We did discuss the possibility of injections, as well as physical therapy. He declines. we will see him back in about three months time for evaluation at which point we will repeat the x-rays of the hips. He will call with any questions or concerns that shall arise.              JR Fidel ROSA, PA-C, ATC

## 2017-06-02 ENCOUNTER — OFFICE VISIT (OUTPATIENT)
Dept: ORTHOPEDIC SURGERY | Age: 82
End: 2017-06-02

## 2017-06-02 VITALS
BODY MASS INDEX: 24.44 KG/M2 | HEIGHT: 69 IN | HEART RATE: 64 BPM | SYSTOLIC BLOOD PRESSURE: 135 MMHG | TEMPERATURE: 96.8 F | WEIGHT: 165 LBS | DIASTOLIC BLOOD PRESSURE: 59 MMHG

## 2017-06-02 DIAGNOSIS — M54.5 LOW BACK PAIN, UNSPECIFIED BACK PAIN LATERALITY, UNSPECIFIED CHRONICITY, WITH SCIATICA PRESENCE UNSPECIFIED: Primary | ICD-10-CM

## 2017-06-02 RX ORDER — CYCLOBENZAPRINE HCL 10 MG
10 TABLET ORAL
Qty: 60 TAB | Refills: 1 | Status: SHIPPED | OUTPATIENT
Start: 2017-06-02 | End: 2017-09-09 | Stop reason: SDUPTHER

## 2017-06-02 RX ORDER — METHYLPREDNISOLONE 4 MG/1
TABLET ORAL
Qty: 1 DOSE PACK | Refills: 0 | Status: SHIPPED | OUTPATIENT
Start: 2017-06-02 | End: 2018-02-18

## 2017-06-15 ENCOUNTER — HOSPITAL ENCOUNTER (OUTPATIENT)
Age: 82
Discharge: HOME | End: 2017-06-15
Attending: PHYSICIAN ASSISTANT
Payer: MEDICARE

## 2017-06-15 DIAGNOSIS — M54.5 LOW BACK PAIN, UNSPECIFIED BACK PAIN LATERALITY, UNSPECIFIED CHRONICITY, WITH SCIATICA PRESENCE UNSPECIFIED: ICD-10-CM

## 2017-06-15 PROCEDURE — 72148 MRI LUMBAR SPINE W/O DYE: CPT

## 2017-07-21 ENCOUNTER — HOSPITAL ENCOUNTER (OUTPATIENT)
Dept: ULTRASOUND IMAGING | Age: 82
Discharge: HOME | End: 2017-07-21
Attending: UROLOGY
Payer: MEDICARE

## 2017-07-21 DIAGNOSIS — R33.9 RETENTION OF URINE, UNSPECIFIED: ICD-10-CM

## 2017-07-21 PROCEDURE — 76770 US EXAM ABDO BACK WALL COMP: CPT

## 2017-07-26 RX ORDER — TRAMADOL HYDROCHLORIDE 50 MG/1
50 TABLET ORAL
Qty: 30 TAB | Refills: 0 | Status: SHIPPED | OUTPATIENT
Start: 2017-07-26 | End: 2018-02-18

## 2017-07-26 NOTE — TELEPHONE ENCOUNTER
Called and spoke with patient. Made him aware his rx was called into the [facility] on file.

## 2017-09-07 ENCOUNTER — OFFICE VISIT (OUTPATIENT)
Dept: ORTHOPEDIC SURGERY | Age: 82
End: 2017-09-07

## 2017-09-07 VITALS
OXYGEN SATURATION: 100 % | SYSTOLIC BLOOD PRESSURE: 126 MMHG | WEIGHT: 165 LBS | DIASTOLIC BLOOD PRESSURE: 56 MMHG | TEMPERATURE: 96.9 F | BODY MASS INDEX: 24.44 KG/M2 | HEART RATE: 72 BPM | HEIGHT: 69 IN

## 2017-09-07 DIAGNOSIS — Z96.642 HISTORY OF TOTAL LEFT HIP REPLACEMENT: ICD-10-CM

## 2017-09-07 DIAGNOSIS — M54.50 CHRONIC BILATERAL LOW BACK PAIN WITHOUT SCIATICA: ICD-10-CM

## 2017-09-07 DIAGNOSIS — G89.29 CHRONIC BILATERAL LOW BACK PAIN WITHOUT SCIATICA: ICD-10-CM

## 2017-09-07 DIAGNOSIS — M25.552 BILATERAL HIP PAIN: Primary | ICD-10-CM

## 2017-09-07 DIAGNOSIS — Z96.641 HISTORY OF TOTAL RIGHT HIP REPLACEMENT: ICD-10-CM

## 2017-09-07 DIAGNOSIS — Z96.643 HISTORY OF BILATERAL HIP REPLACEMENTS: ICD-10-CM

## 2017-09-07 DIAGNOSIS — M25.551 BILATERAL HIP PAIN: Primary | ICD-10-CM

## 2017-09-07 RX ORDER — DICLOFENAC SODIUM 10 MG/G
4 GEL TOPICAL 4 TIMES DAILY
Qty: 100 G | Refills: 5 | Status: SHIPPED | OUTPATIENT
Start: 2017-09-07 | End: 2018-02-18

## 2017-09-07 NOTE — PROGRESS NOTES
Patient: Sarah Kimbrough                MRN: [id number]       SSN: [id number]  YOB: 1934        AGE: 80 y.o. SEX: male  Body mass index is 24.37 kg/(m^2). PCP: Vashti Marrufo MD  09/07/17    HISTORY:  I had the pleasure of reviewing Wesley Jensen. He is a very delightful gentleman. We reconstructed both of his hips a couple of years ago. He had very stiff hips, protrusio. He needed bone grafting and acetabular reconstructions and overall has done quite well. My [de-identified] assistant quite astutely ordered an MRI. He was having radicular pain going all the way down to the foot hurting him if he stood at the kitchen sink for any period of time, having to go sit down. He has no groin pain and no thigh pain. He has a little bit of pain on the right lateral hip. When I first met him, he was on a walker, and now he walks unassisted but kyphotic. PHYSICAL EXAMINATION:  On examination today, he is walking kyphotic without antalgic component to the gait. There is perhaps just slight Trendelenburg on the right side. Both hips rotate well. He is tender over the right greater trochanters, which is just mild. The low back is actually the most tender area. The knee has decreased sensation to L4-5 laterally but worse on the right than on the left. There is no foot dorp. EHL is -5/5. MRI confirms quite severe stenosis and arthritis. There is also some fairly severe nerve root compression. PLAN:  I am going to send him to The [address] [place] [address]. With regards to his hip, I have offered to inject it. He would like to try some Voltaren cream, and then we will see him back in followup afterwards. It has been a pleasure to share in his care. He actually has had, overall, very good reconstruction of his hips. He was extremely debilitated when we first met him. I would be very happy to inject his right hip for trochanteric bursitis when he would like.           REVIEW OF SYSTEMS: CON: negative for weight loss, fever  EYE: negative for double vision  ENT: negative for hoarseness  RS:   negative for Tb  GI:    negative for blood in stool  :  negative for blood in urine  Other systems reviewed and noted below. Past Medical History:   Diagnosis Date    Arthritis     Chronic pain     left hip and lower back    Hypertension     Left hip pain     resolved since surgery    Thromboembolus ([address] 1975    brain, [facility] Wears glasses        Family History   Problem Relation Age of Onset    Family history unknown: Yes       Current Outpatient Prescriptions   Medication Sig Dispense Refill    traMADol (ULTRAM) 50 mg tablet Take 1 Tab by mouth every six (6) hours as needed for Pain. Max Daily Amount: 200 mg. 30 Tab 0    ferrous sulfate 325 mg (65 mg iron) tablet take 1 tablet by mouth once daily  0    tamsulosin (FLOMAX) 0.4 mg capsule Take 1 Cap by mouth daily. 90 Cap 3    cyclobenzaprine (FLEXERIL) 10 mg tablet Take 1 Tab by mouth three (3) times daily as needed for Muscle Spasm(s). 60 Tab 1    diclofenac (VOLTAREN) 1 % gel Apply 4 g to affected area four (4) times daily. 5 Each 0    naproxen (NAPROSYN) 500 mg tablet Take 1 Tab by mouth two (2) times daily (with meals). 60 Tab 2    gabapentin (NEURONTIN) 100 mg capsule Take 1 Cap by mouth three (3) times daily. 90 Cap 2    gabapentin (NEURONTIN) 100 mg capsule Take  by mouth three (3) times daily.  amLODIPine-benazepril (LOTREL) 10-20 mg per capsule Take 1 Cap by mouth daily.       methylPREDNISolone (MEDROL DOSEPACK) 4 mg tablet Per dose pack instructions 1 Dose Pack 0       No Known Allergies    Past Surgical History:   Procedure Laterality Date    FOOT/TOES SURGERY PROC UNLISTED      HX HIP REPLACEMENT      HX OTHER SURGICAL  1975    brain surgery, Dr. Carole Forte Left 6/2015       Social History     Social History    Marital status:      Spouse name: N/A    Number of children: N/A  Years of education: N/A     Occupational History    Not on file. Social History Main Topics    Smoking status: Former Smoker     Packs/day: 1.00     Years: 25.00     Quit date: 6/1/1990    Smokeless tobacco: Never Used    Alcohol use 0.5 oz/week     1 Shots of liquor per week    Drug use: No    Sexual activity: Not on file     Other Topics Concern    Not on file     Social History Narrative       Visit Vitals    /56 (BP 1 Location: Left arm, BP Patient Position: Sitting)    Pulse 72    Temp 96.9 °F (36.1 °C) (Oral)    Ht 5' 9\" (1.753 m)    Wt 165 lb (74.8 kg)    SpO2 100%    BMI 24.37 kg/m2         PHYSICAL EXAMINATION:  GENERAL: Alert and oriented x3, in no acute distress, well-developed, well-nourished, afebrile. HEART: No JVD. EYES: No scleral icterus   NECK: No significant lymphadenopathy   LUNGS: No respiratory compromise or indrawing  ABDOMEN: Soft, non-tender, non-distended. Electronically signed by:  Braulio Carney MD

## 2017-09-11 RX ORDER — CYCLOBENZAPRINE HCL 10 MG
TABLET ORAL
Qty: 60 TAB | Refills: 1 | Status: SHIPPED | OUTPATIENT
Start: 2017-09-11 | End: 2018-09-06

## 2017-10-12 ENCOUNTER — OFFICE VISIT (OUTPATIENT)
Dept: ORTHOPEDIC SURGERY | Age: 82
End: 2017-10-12

## 2017-10-12 VITALS
TEMPERATURE: 98.5 F | DIASTOLIC BLOOD PRESSURE: 54 MMHG | SYSTOLIC BLOOD PRESSURE: 124 MMHG | OXYGEN SATURATION: 99 % | RESPIRATION RATE: 16 BRPM | WEIGHT: 160.8 LBS | HEIGHT: 69 IN | BODY MASS INDEX: 23.82 KG/M2 | HEART RATE: 61 BPM

## 2017-10-12 DIAGNOSIS — M71.30 SYNOVIAL CYST: Primary | ICD-10-CM

## 2017-10-12 DIAGNOSIS — M48.062 SPINAL STENOSIS OF LUMBAR REGION WITH NEUROGENIC CLAUDICATION: Chronic | ICD-10-CM

## 2017-10-12 RX ORDER — AMLODIPINE BESYLATE 10 MG/1
TABLET ORAL
Refills: 0 | COMMUNITY
Start: 2017-07-25 | End: 2019-02-14 | Stop reason: CLARIF

## 2017-10-12 RX ORDER — GABAPENTIN 100 MG/1
100 CAPSULE ORAL 2 TIMES DAILY
Qty: 60 CAP | Refills: 5 | Status: ON HOLD | OUTPATIENT
Start: 2017-10-12 | End: 2018-02-16

## 2017-10-12 NOTE — PROGRESS NOTES
[facility] [address]  [address], [address]  [place], [facility] [address]  Phone: [phone]  Fax: [phone]        Supa Mouse  : 1934  PCP: Shmuel Beckman MD    PROGRESS NOTE      ASSESSMENT AND PLAN    Diagnoses and all orders for this visit:    1. Synovial cyst, RT L4-5    2. Spinal stenosis of lumbar region with neurogenic claudication    Other orders  -     gabapentin (NEURONTIN) 100 mg capsule; Take 1 Cap by mouth two (2) times a day. 1. Advised to continue HEP. 2. Injections and medications discussed as possible treatment options. 3. DC Flexeril. 4. Restart Gabapentin 100 mg BID. 5. Pt may call and be set up for RT L5 SNRB and RT L4-5 facet joint injection. 6. Given information on stenosis. Follow-up Disposition:  Return in about 6 months (around 2018). HISTORY OF PRESENT ILLNESS  Rosalba Ojeda is a 80 y.o. male. Pt presents to the office for a f/u visit for stenosis. He was sent back for re-evaluation at the request of Dr. Gabrielle Davidson. Pt has had a new lumbar spine MRI. Images reviewed. Pt continues to have back pain. He states that he has some paresthesias in his RT foot. Pt reports pain does radiate into his RLE. His pain mainly bothers him with sitting. His back pain only bothers him with walking. He admits to positive shopping cart sign. He has a standing and walking tolerance of about a few minutes. Pt denies weakness in BLE. Pt denies saddle paresthesias. Pt states he has been using Naprosyn, Flexeril with dizziness. He did not take any of his medications the other day and he had no dizziness. Pt is on Naprosyn 500 mg BID. Denies persistent fevers, chills, weight changes, neurogenic bowel or bladder symptoms. Pt denies recent ED visits or hospitalizations. Pt works at [employer]. If his pain begins to bother him at work, he will grab a shopping cart.      Pain Assessment  10/12/2017   Location of Pain Back   Location Modifiers -   Severity of Pain 3   Quality of Pain Aching   Quality of Pain Comment numbness   Duration of Pain -   Frequency of Pain Intermittent   Date Pain First Started -   Aggravating Factors Walking   Limiting Behavior -   Relieving Factors Rest   Relieving Factors Comment -   Result of Injury -           MRI Results (most recent):    Results from Hospital Encounter encounter on 06/15/17   MRI LUMB SPINE WO CONT   Narrative MR LUMBAR SPINE WITHOUT CONTRAST  CPT CODE: 46032  HISTORY: Disc disease. Sciatica and/or radiculopathy for less than 6 weeks. Evaluate for nerve impingement. Right lower extremity discomfort. COMPARISON: MR the lumbar spine 8/15/2016. TECHNIQUE: Lumbar spine scanned with axial and sagittal T1W and T2W scans. FINDINGS:   There is a mass like focus at the caudal aspect of the bladder, which is  incompletely evaluated. The most likely explanation is an enlarged prostate  (reference, for example, series 7 image 9). This was not seen on the prior MR  scan of the lumbar spine which did not cover quite as far caudally. However, the  MR scan of the left hip 1/23/2015 demonstrates enlarged prostate with same  general morphology, encroaching on the bladder. There is mild apex right curvature centered mid to upper lumbar spine. Multilevel mild grade Ilisthesis. All lumbar discs are narrowed to some degree  and are variably narrowed side to side. Most narrowed discs are L1/L2 through  L2/L3 which are, at least focally, markedly narrowed. There are several small  endplate impressions, \"Schmorl's nodes\". There is multilevel small amount of  endplate and subjacent marrow edema, a response to DDD. There is also some  chronic marrow reaction to DDD. Some subchondral lesions adjoining sacroiliac  joints are very probably related to DJD. Contents of thecal sac looks normal.    T10/T11: Sagittal scans only. Facet and ligament hypertrophy. There is no  central spinal stenosis.  Some degenerative foraminal narrowing present, not well  evaluated. T11/T12: Sagittal scans only. Small ventral discal impression on thecal sac is  suggested worse towards right side of canal. Facet and ligament hypertrophy. No  central spinal stenosis. Superior articular facet hypertrophy and discal spur or  disc material may moderately narrow the left and mildly narrow the right foramen  T12/L1: Incompletely evaluated on axial scans. Superior articular facet  hypertrophy at least mildly narrows left foramen. Right foramen suggested  adequate. Small broad disc protrusion central to left lateral recess position,  plus some probably asymmetric left lateral recess spur. Some facet and ligament  hypertrophy. There is no central spinal stenosis. Superior articular facet  hypertrophy may mildly narrow left foramen. Right foramen looks adequate  L1/L2: Facet and ligament hypertrophy. Medium sized disc bulge with possibly  small left lateral recess disc protrusion. There is no central spinal stenosis. Foraminal disc material may mildly narrow the right foramen. Superior articular  facet hypertrophy and foraminal disc material at least mildly narrow the left  foramen  L2/L3: Medium size ventral discal impression on thecal sac without definite  focality. Facet and ligament hypertrophy. There is no central spinal stenosis. Superior articular facet hypertrophy and foraminal disc material at least mildly  L3/L4: medium sized broad ventral discal impression on thecal sac is worse  central canal to left lateral recess, protrusion superimposed upon disc bulge. Facet and ligament hypertrophy and significant constriction of thecal sac,  particularly at the left lateral recess, which is markedly stenotic. Overall  moderate central spinal stenosis. Superior articular facet hypertrophy and disc  material mildly narrow the right foramen. Same factors moderately to markedly  narrow the left foramen, with nerve root compression.   L4/L5: Disc bulge with suggested small central to right lateral recess small  disc protrusion plus facet and ligament hypertrophy cause moderate to marked  central spinal stenosis. At the higher disc level and immediately more cephalad,  there is a rounded 6 mm increased T2 signal focus at the right lateral canal,  contiguous with facet joint, with a facet joint cyst. This markedly impresses  the right side of the thecal sac. Foraminal disc material minimally compromises  left foramen. Superior articular facet hypertrophy and foraminal disc material  may moderately compromised the right foramen. L5/S1: Small disc bulge and, above the disc level, small pseudobulge. Facet and  ligament hypertrophy. There may be asymmetric narrowing of the right lateral  recess of the thecal sac. Left foramen adequate. Foraminal disc material and  decreased disc space height severe narrows the right foramen with root  compression        IMPRESSION  1. 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 Degenerative central spinal stenosis present. -Moderate central spinal  stenosis L3/L4  -Moderate to marked central spinal stenosis L4/L5. A 6 mm right canal facet  joint cyst contributes  3. Multilevel degenerative foraminal narrowing, including  -L5/S1 on the right, severe, with root compression  -L3/L4 on the left, moderate to marked, with root compression  4.  Enlarged prostate encroaches upon inferior bladder, present on prior imaging  but not otherwise characterized    Thank you for this referral.               PAST MEDICAL HISTORY   Past Medical History:   Diagnosis Date    Arthritis     Chronic pain     left hip and lower back    Hypertension     Left hip pain     resolved since surgery    Thromboembolus ([address] 1975    brain, [facility] Wears glasses        Past Surgical History:   Procedure Laterality Date    FOOT/TOES SURGERY PROC UNLISTED      HX HIP REPLACEMENT      HX OTHER SURGICAL  1975    brain surgery, Dr. Huerta Nearing Left 6/2015   . MEDICATIONS    Current Outpatient Prescriptions   Medication Sig Dispense Refill    amLODIPine (NORVASC) 10 mg tablet   0    cyclobenzaprine (FLEXERIL) 10 mg tablet take 1 tablet by mouth three times a day if needed for muscle spasm 60 Tab 1    diclofenac (VOLTAREN) 1 % gel Apply 4 g to affected area four (4) times daily. 100 g 5    traMADol (ULTRAM) 50 mg tablet Take 1 Tab by mouth every six (6) hours as needed for Pain. Max Daily Amount: 200 mg. 30 Tab 0    ferrous sulfate 325 mg (65 mg iron) tablet take 1 tablet by mouth once daily  0    tamsulosin (FLOMAX) 0.4 mg capsule Take 1 Cap by mouth daily. 90 Cap 3    methylPREDNISolone (MEDROL DOSEPACK) 4 mg tablet Per dose pack instructions 1 Dose Pack 0    naproxen (NAPROSYN) 500 mg tablet Take 1 Tab by mouth two (2) times daily (with meals). 60 Tab 2    gabapentin (NEURONTIN) 100 mg capsule Take 1 Cap by mouth three (3) times daily. 90 Cap 2    gabapentin (NEURONTIN) 100 mg capsule Take  by mouth three (3) times daily.  amLODIPine-benazepril (LOTREL) 10-20 mg per capsule Take 1 Cap by mouth daily. ALLERGIES  No Known Allergies       SOCIAL HISTORY    Social History     Social History    Marital status:      Spouse name: N/A    Number of children: N/A    Years of education: N/A     Occupational History    Not on file.      Social History Main Topics    Smoking status: Former Smoker     Packs/day: 1.00     Years: 25.00     Quit date: 6/1/1990    Smokeless tobacco: Never Used    Alcohol use 0.5 oz/week     1 Shots of liquor per week    Drug use: No    Sexual activity: Not on file     Other Topics Concern    Not on file     Social History Narrative       FAMILY HISTORY  Family History   Problem Relation Age of Onset    Family history unknown: Yes       REVIEW OF SYSTEMS  Review of Systems   Constitutional: Negative for chills, fever and weight loss. Respiratory: Negative for shortness of breath. Cardiovascular: Negative for chest pain. Gastrointestinal: Negative for constipation. Negative for fecal incontinence   Genitourinary: Negative for dysuria. Negative for urinary incontinence   Musculoskeletal:        Per HPI   Skin: Negative for rash. Neurological: Positive for tingling. Negative for dizziness, tremors, focal weakness and headaches. Endo/Heme/Allergies: Does not bruise/bleed easily. Psychiatric/Behavioral: The patient does not have insomnia. PHYSICAL EXAMINATION  Visit Vitals    /54    Pulse 61    Temp 98.5 °F (36.9 °C) (Oral)    Resp 16    Ht 5' 9\" (1.753 m)    Wt 160 lb 12.8 oz (72.9 kg)    SpO2 99%    BMI 23.75 kg/m2         Accompanied by self. Constitutional:  Well developed, well nourished, in no acute distress. Psychiatric: Affect and mood are appropriate. Integumentary: No rashes or abrasions noted on exposed areas. Cardiovascular/Peripheral Vascular: Intact l pulses. No peripheral edema is noted. Lymphatic:  No evidence of lymphedema. No cervical lymphadenopathy. SPINE/MUSCULOSKELETAL EXAM    Lumbar spine:  No rash, ecchymosis, or gross obliquity. No fasciculations. No focal atrophy is noted. Forward flexed. No tenderness to palpation at the sciatic notch. SI joints non-tender. Trochanters non tender. Sensation grossly intact to light touch. MOTOR:       Hip Flex  Quads Hamstrings Ankle DF EHL Ankle PF   Right +4/5 +4/5 +4/5 +4/5 +4/5 +4/5   Left +4/5 +4/5 +4/5 +4/5 +4/5 +4/5       Straight Leg raise negative. Ambulation without assistive device. FWB. Written by Lavon Mcnally, as dictated by Constance Shipman MD.    I, Dr. Constance Shipman MD, confirm that all documentation is accurate. Mr. Mortimer Honey may have a reminder for a \"due or due soon\" health maintenance.  I have asked that he contact his primary care provider for follow-up on this health maintenance.

## 2017-10-12 NOTE — PROGRESS NOTES
Verbal order entered per Dr. Isadora Veag as documented on blue sheet:   -gabapentin 100 mg, 1 tab PO BID, disp 60, 5 RF

## 2017-10-12 NOTE — MR AVS SNAPSHOT
Visit Information Date & Time Provider Department Dept. Phone Encounter #  
 10/12/2017 11:00 AM Dilma Barajas MD [facility] [phone] [id number] Follow-up Instructions Return in about 6 months (around 4/12/2018). Your Appointments 12/7/2017  1:15 PM  
Follow Up with Vivica Klinefelter, MD  
[address] [facility], [address] [facility] ([facility]) Appt Note: rt hip 3 mo fu  
 [address], [address] [facility]  
[phone] [address] [facility]  
  
    
  
 6/28/2018  9:00 AM  
Any with Samira Santos MD  
[facility] ([facility]) Appt Note: 1 YR FU  
 [address] 
[address] [facility] [address]  
[address] [facility] [address] [facility] [address] [facility] [address] Upcoming Health Maintenance Date Due DTaP/Tdap/Td series (1 - Tdap) 1/27/1955 ZOSTER VACCINE AGE 60> 11/27/1993 GLAUCOMA SCREENING Q2Y 1/27/1999 Pneumococcal 65+ Low/Medium Risk (1 of 2 - PCV13) 1/27/1999 MEDICARE YEARLY EXAM 1/27/1999 INFLUENZA AGE 9 TO ADULT 8/1/2017 Allergies as of 10/12/2017  Review Complete On: 10/12/2017 By: Basia Galindo LPN No Known Allergies Current Immunizations  Never Reviewed No immunizations on file. Not reviewed this visit You Were Diagnosed With   
  
 Codes Comments Synovial cyst    -  Primary ICD-10-CM: M71.30 ICD-9-CM: 727.40 Spinal stenosis of lumbar region with neurogenic claudication     ICD-10-CM: M48.062 
ICD-9-CM: 724.03 Vitals BP Pulse Temp Resp Height(growth percentile) Weight(growth percentile) 124/54 61 98.5 °F (36.9 °C) (Oral) 16 5' 9\" (1.753 m) 160 lb 12.8 oz (72.9 kg) SpO2 BMI Smoking Status 99% 23.75 kg/m2 Former Smoker BMI and BSA Data Body Mass Index Body Surface Area  
 23.75 kg/m 2 1.88 m 2 Preferred Pharmacy Pharmacy Name Phone [address] [facility], [address] Your Updated Medication List  
  
   
This list is accurate as of: 10/12/17 11:43 AM.  Always use your most recent med list. amLODIPine 10 mg tablet Commonly known as:  NORVASC  
  
 cyclobenzaprine 10 mg tablet Commonly known as:  FLEXERIL  
take 1 tablet by mouth three times a day if needed for muscle spasm  
  
 diclofenac 1 % Gel Commonly known as:  VOLTAREN Apply 4 g to affected area four (4) times daily. ferrous sulfate 325 mg (65 mg iron) tablet  
take 1 tablet by mouth once daily * gabapentin 100 mg capsule Commonly known as:  NEURONTIN Take  by mouth three (3) times daily. * gabapentin 100 mg capsule Commonly known as:  NEURONTIN Take 1 Cap by mouth three (3) times daily. LOTREL 10-20 mg per capsule Generic drug:  amLODIPine-benazepril Take 1 Cap by mouth daily. methylPREDNISolone 4 mg tablet Commonly known as:  Marshall Glee Per dose pack instructions  
  
 naproxen 500 mg tablet Commonly known as:  NAPROSYN Take 1 Tab by mouth two (2) times daily (with meals). tamsulosin 0.4 mg capsule Commonly known as:  FLOMAX Take 1 Cap by mouth daily. traMADol 50 mg tablet Commonly known as:  ULTRAM  
Take 1 Tab by mouth every six (6) hours as needed for Pain. Max Daily Amount: 200 mg.  
  
 * Notice: This list has 2 medication(s) that are the same as other medications prescribed for you. Read the directions carefully, and ask your doctor or other care provider to review them with you. Follow-up Instructions Return in about 6 months (around 4/12/2018). Patient Instructions Lumbar Spinal Stenosis: Care Instructions Your Care Instructions Stenosis in the spine is a narrowing of the canal that is around the spinal cord and nerve roots in your back. It can happen as part of aging. Sometimes bone and other tissue grow into this canal and press on the nerves that branch out from the spinal cord. This can cause pain, numbness, and weakness. When it happens in the lower part of your back, it is called lumbar spinal stenosis. It can cause problems in the legs, feet, and rear end (buttocks). You may be able to get relief from the symptoms of spinal stenosis by taking pain medicine. Your doctor may suggest physical therapy and exercises to keep your spine strong and flexible. Some people try steroid shots to reduce swelling. If pain and numbness in your legs are still so bad that you cannot do your normal activities, you may need surgery. Follow-up care is a key part of your treatment and safety. Be sure to make and go to all appointments, and call your doctor if you are having problems. It's also a good idea to know your test results and keep a list of the medicines you take. How can you care for yourself at home? · Take an over-the-counter pain medicine, such as acetaminophen (Tylenol), ibuprofen (Advil, Motrin), or naproxen (Aleve). Be safe with medicines. Read and follow all instructions on the label. · Do not take two or more pain medicines at the same time unless the doctor told you to. Many pain medicines have acetaminophen, which is Tylenol. Too much acetaminophen (Tylenol) can be harmful. · Stay at a healthy weight. Being overweight puts extra strain on your spine. · Change positions often when you sit or stand. This can ease pain. It may also reduce pressure on the spinal cord and its nerves. · Avoid doing things that make your symptoms worse. Walking downhill and standing for a long time may cause pain. · Stretch and strengthen your back muscles as your doctor or physical therapist recommends. If your doctor says it is okay to do them, these exercises may help. ¨ Lie on your back with your knees bent.  Gently pull one bent knee to your chest. Put that foot back on the floor, and then pull the other knee to your chest. 
¨ Do pelvic tilts. Lie on your back with your knees bent. Tighten your stomach muscles. Pull your belly button (navel) in and up toward your ribs. You should feel like your back is pressing to the floor and your hips and pelvis are slightly lifting off the floor. Hold for 6 seconds while breathing smoothly. ¨ Stand with your back flat against a wall. Slowly slide down until your knees are slightly bent. Hold for 10 seconds, then slide back up the wall. · Remove or change anything in your house that may cause you to fall. Keep walkways clear of clutter, electrical cords, and throw rugs. When should you call for help? Call 911 anytime you think you may need emergency care. For example, call if: 
· You are unable to move a leg at all. Call your doctor now or seek immediate medical care if: 
· You have new or worse symptoms in your legs, belly, or buttocks. Symptoms may include: ¨ Numbness or tingling. ¨ Weakness. ¨ Pain. · You lose bladder or bowel control. Watch closely for changes in your health, and be sure to contact your doctor if: 
· You are not getting better as expected. Where can you learn more? Go to http://em-gus.info/. Keyanna Ashraf in the search box to learn more about \"Lumbar Spinal Stenosis: Care Instructions. \" Current as of: March 21, 2017 Content Version: 11.3 © 2301-4000 [employer]. Care instructions adapted under license by [employer] (which disclaims liability or warranty for this information). If you have questions about a medical condition or this instruction, always ask your healthcare professional. [place] [address] any warranty or liability for your use of this information. Introducing [facility]!    
 Melissa Escamilla introduces [employer] patient portal. Now you can access parts of your medical record, email your doctor's office, and request medication refills online. 1. In your internet browser, go to [email or web address] 2. Click on the First Time User? Click Here link in the Sign In box. You will see the New Member Sign Up page. 3. Enter your O-RID Access Code exactly as it appears below. You will not need to use this code after youve completed the sign-up process. If you do not sign up before the expiration date, you must request a new code. · O-RID Access Code: [email or web address] Expires: 10/19/2017  8:45 AM 
 
4. Enter the last four digits of your Social Security Number (xxxx) and Date of Birth (mm/dd/yyyy) as indicated and click Submit. You will be taken to the next sign-up page. 5. Create a O-RID ID. This will be your O-RID login ID and cannot be changed, so think of one that is secure and easy to remember. 6. Create a O-RID password. You can change your password at any time. 7. Enter your Password Reset Question and Answer. This can be used at a later time if you forget your password. 8. Enter your e-mail address. You will receive e-mail notification when new information is available in [address] 9. Click Sign Up. You can now view and download portions of your medical record. 10. Click the Download Summary menu link to download a portable copy of your medical information. If you have questions, please visit the Frequently Asked Questions section of the O-RID website. Remember, O-RID is NOT to be used for urgent needs. For medical emergencies, dial 911. Now available from your iPhone and Android! Please provide this summary of care documentation to your next provider. Your primary care clinician is listed as [facility]. If you have any questions after today's visit, please call [phone].

## 2017-10-12 NOTE — PATIENT INSTRUCTIONS
Lumbar Spinal Stenosis: Care Instructions  Your Care Instructions    Stenosis in the spine is a narrowing of the canal that is around the spinal cord and nerve roots in your back. It can happen as part of aging. Sometimes bone and other tissue grow into this canal and press on the nerves that branch out from the spinal cord. This can cause pain, numbness, and weakness. When it happens in the lower part of your back, it is called lumbar spinal stenosis. It can cause problems in the legs, feet, and rear end (buttocks). You may be able to get relief from the symptoms of spinal stenosis by taking pain medicine. Your doctor may suggest physical therapy and exercises to keep your spine strong and flexible. Some people try steroid shots to reduce swelling. If pain and numbness in your legs are still so bad that you cannot do your normal activities, you may need surgery. Follow-up care is a key part of your treatment and safety. Be sure to make and go to all appointments, and call your doctor if you are having problems. It's also a good idea to know your test results and keep a list of the medicines you take. How can you care for yourself at home? · Take an over-the-counter pain medicine, such as acetaminophen (Tylenol), ibuprofen (Advil, Motrin), or naproxen (Aleve). Be safe with medicines. Read and follow all instructions on the label. · Do not take two or more pain medicines at the same time unless the doctor told you to. Many pain medicines have acetaminophen, which is Tylenol. Too much acetaminophen (Tylenol) can be harmful. · Stay at a healthy weight. Being overweight puts extra strain on your spine. · Change positions often when you sit or stand. This can ease pain. It may also reduce pressure on the spinal cord and its nerves. · Avoid doing things that make your symptoms worse. Walking downhill and standing for a long time may cause pain.   · Stretch and strengthen your back muscles as your doctor or physical therapist recommends. If your doctor says it is okay to do them, these exercises may help. ¨ Lie on your back with your knees bent. Gently pull one bent knee to your chest. Put that foot back on the floor, and then pull the other knee to your chest.  ¨ Do pelvic tilts. Lie on your back with your knees bent. Tighten your stomach muscles. Pull your belly button (navel) in and up toward your ribs. You should feel like your back is pressing to the floor and your hips and pelvis are slightly lifting off the floor. Hold for 6 seconds while breathing smoothly. ¨ Stand with your back flat against a wall. Slowly slide down until your knees are slightly bent. Hold for 10 seconds, then slide back up the wall. · Remove or change anything in your house that may cause you to fall. Keep walkways clear of clutter, electrical cords, and throw rugs. When should you call for help? Call 911 anytime you think you may need emergency care. For example, call if:  · You are unable to move a leg at all. Call your doctor now or seek immediate medical care if:  · You have new or worse symptoms in your legs, belly, or buttocks. Symptoms may include:  ¨ Numbness or tingling. ¨ Weakness. ¨ Pain. · You lose bladder or bowel control. Watch closely for changes in your health, and be sure to contact your doctor if:  · You are not getting better as expected. Where can you learn more? Go to http://em-gus.info/. Ayaka Dudley in the search box to learn more about \"Lumbar Spinal Stenosis: Care Instructions. \"  Current as of: March 21, 2017  Content Version: 11.3  © 8296-4314 [employer]. Care instructions adapted under license by [employer] (which disclaims liability or warranty for this information).  If you have questions about a medical condition or this instruction, always ask your healthcare professional. [place] [address] any warranty or liability for your use of this information.

## 2017-10-31 RX ORDER — NAPROXEN 500 MG/1
500 TABLET ORAL 2 TIMES DAILY WITH MEALS
Qty: 60 TAB | Refills: 2 | Status: SHIPPED | OUTPATIENT
Start: 2017-10-31 | End: 2018-02-09 | Stop reason: SDUPTHER

## 2017-10-31 NOTE — TELEPHONE ENCOUNTER
Last Visit: 10/12/2017 with MD Alisha Dumont    Next Appointment: 04/18/2018 with MD Alisha Dumont   Previous Refill Encounters: 04/20/2017 per MD Alisha Dumont #60 with 2 refills     Requested Prescriptions     Pending Prescriptions Disp Refills    naproxen (NAPROSYN) 500 mg tablet 60 Tab 2     Sig: Take 1 Tab by mouth two (2) times daily (with meals).

## 2017-12-07 ENCOUNTER — OFFICE VISIT (OUTPATIENT)
Dept: ORTHOPEDIC SURGERY | Age: 82
End: 2017-12-07

## 2017-12-07 VITALS
WEIGHT: 162 LBS | BODY MASS INDEX: 23.99 KG/M2 | DIASTOLIC BLOOD PRESSURE: 49 MMHG | SYSTOLIC BLOOD PRESSURE: 121 MMHG | OXYGEN SATURATION: 99 % | HEART RATE: 57 BPM | HEIGHT: 69 IN | RESPIRATION RATE: 15 BRPM

## 2017-12-07 DIAGNOSIS — M70.61 TROCHANTERIC BURSITIS OF RIGHT HIP: ICD-10-CM

## 2017-12-07 DIAGNOSIS — Z96.643 HISTORY OF BILATERAL HIP REPLACEMENTS: Primary | ICD-10-CM

## 2017-12-07 RX ORDER — BETAMETHASONE SODIUM PHOSPHATE AND BETAMETHASONE ACETATE 3; 3 MG/ML; MG/ML
6 INJECTION, SUSPENSION INTRA-ARTICULAR; INTRALESIONAL; INTRAMUSCULAR; SOFT TISSUE ONCE
Qty: 1 VIAL | Refills: 0
Start: 2017-12-07 | End: 2017-12-07

## 2017-12-07 NOTE — PROGRESS NOTES
Patient: Mitchell Cat                MRN: [id number]       SSN: [id number]  YOB: 1934        AGE: 80 y.o. SEX: male  Body mass index is 23.92 kg/(m^2). PCP: Fransico Marino MD  12/07/17  HISTORY: I had the pleasure of reviewing Tian Mcknight. Tian Mcknight is a very tough gentleman. We have reconstructed both of his hips, both were intrusion, both were incarcerated, and both required impaction allografting and reconstruction of his hip. He has overall done extremely well. He is seeing Dr. Nancy Cr for his back, and is going to get some treatments for that, and he is also complaining of some mild to moderate right lateral hip pain, especially when he rolls over on it. He has no groin pain, no thigh pain, and no start-up pain. He has otherwise been feeling well. PHYSICAL EXAMINATION:  On examination today, he appears younger than his stated age of 80. He is a slim gentleman with a BMI of 24. There is no scleral icterus or JVD. Abdominal examination is nontender. The pelvis is stable. The hips rotate well. He has a nicely healed incision. He is tender over the greater trochanter on the right side. The left side is benign. The low back is mildly tender as well. He does have some evidence of neuropathy and a little bit of decreased sensation to L4, although no foot drop, and tib/ant and EHL are 5/5. Straight leg raise is negative today. Both feet are warm and well perfused. There is no cyanosis or clubbing. There is minimal peripheral edema distally. RADIOGRAPHS:  Review of his x-rays, shows his acetabular reconstructions have healed beautifully. 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 IMPRESSION:  My overall impression is done extremely well with hip reconstruction surgeries with features of trochanteric bursitis on the right side.      PROCEDURE:  Under aseptic conditions and after informed, written consent with a time out, the right hand trochanteric bursa was injected with 1 cc of the Celestone preparation, i.e. 6 mg, which was well tolerated. PLAN:  He would like to have an injection today. He is going to return to see us just into the new year for followup assessment. REVIEW OF SYSTEMS:      CON: negative for weight loss, fever  EYE: negative for double vision  ENT: negative for hoarseness  RS:   negative for Tb  GI:    negative for blood in stool  :  negative for blood in urine  Other systems reviewed and noted below. 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 60 Tab 2    amLODIPine (NORVASC) 10 mg tablet   0    ferrous sulfate 325 mg (65 mg iron) tablet take 1 tablet by mouth once daily  0    tamsulosin (FLOMAX) 0.4 mg capsule Take 1 Cap by mouth daily. 90 Cap 3    gabapentin (NEURONTIN) 100 mg capsule Take 1 Cap by mouth three (3) times daily. 90 Cap 2    gabapentin (NEURONTIN) 100 mg capsule Take 1 Cap by mouth two (2) times a day. 60 Cap 5    cyclobenzaprine (FLEXERIL) 10 mg tablet take 1 tablet by mouth three times a day if needed for muscle spasm 60 Tab 1    diclofenac (VOLTAREN) 1 % gel Apply 4 g to affected area four (4) times daily. 100 g 5    traMADol (ULTRAM) 50 mg tablet Take 1 Tab by mouth every six (6) hours as needed for Pain. Max Daily Amount: 200 mg. 30 Tab 0    methylPREDNISolone (MEDROL DOSEPACK) 4 mg tablet Per dose pack instructions 1 Dose Pack 0    gabapentin (NEURONTIN) 100 mg capsule Take  by mouth three (3) times daily.       amLODIPine-benazepril (LOTREL) 10-20 mg per capsule Take 1 Cap by mouth daily. No Known Allergies    Past Surgical History:   Procedure Laterality Date    FOOT/TOES SURGERY PROC UNLISTED      HX HIP REPLACEMENT      HX OTHER SURGICAL  1975    brain surgery, Dr. Johnathon Parson Left 6/2015       Social History     Social History    Marital status:      Spouse name: N/A    Number of children: N/A    Years of education: N/A     Occupational History    Not on file. Social History Main Topics    Smoking status: Former Smoker     Packs/day: 1.00     Years: 25.00     Quit date: 6/1/1990    Smokeless tobacco: Never Used    Alcohol use 0.5 oz/week     1 Shots of liquor per week    Drug use: No    Sexual activity: Not on file     Other Topics Concern    Not on file     Social History Narrative       Visit Vitals    /49    Pulse (!) 57    Resp 15    Ht 5' 9\" (1.753 m)    Wt 162 lb (73.5 kg)    SpO2 99%    BMI 23.92 kg/m2         PHYSICAL EXAMINATION:  GENERAL: Alert and oriented x3, in no acute distress, well-developed, well-nourished, afebrile. HEART: No JVD. EYES: No scleral icterus   NECK: No significant lymphadenopathy   LUNGS: No respiratory compromise or indrawing  ABDOMEN: Soft, non-tender, non-distended. Electronically signed by:  Akshat Beckman MD

## 2018-02-11 RX ORDER — NAPROXEN 500 MG/1
TABLET ORAL
Qty: 60 TAB | Refills: 2 | Status: SHIPPED | OUTPATIENT
Start: 2018-02-11 | End: 2018-02-18

## 2018-02-15 ENCOUNTER — HOSPITAL ENCOUNTER (INPATIENT)
Age: 83
LOS: 3 days | Discharge: HOME HEALTH | DRG: 381 | End: 2018-02-18
Attending: EMERGENCY MEDICINE | Admitting: FAMILY MEDICINE
Payer: MEDICARE

## 2018-02-15 ENCOUNTER — APPOINTMENT (OUTPATIENT)
Dept: CT IMAGING | Age: 83
DRG: 381 | End: 2018-02-15
Attending: EMERGENCY MEDICINE
Payer: MEDICARE

## 2018-02-15 DIAGNOSIS — R55 SYNCOPE AND COLLAPSE: Primary | ICD-10-CM

## 2018-02-15 PROBLEM — K92.2 GI BLEED: Status: ACTIVE | Noted: 2018-02-15

## 2018-02-15 PROBLEM — D64.9 ANEMIA: Status: ACTIVE | Noted: 2018-02-15

## 2018-02-15 LAB
ALBUMIN SERPL-MCNC: 2.9 G/DL (ref 3.4–5)
ALBUMIN/GLOB SERPL: 1 {RATIO} (ref 0.8–1.7)
ALP SERPL-CCNC: 61 U/L (ref 45–117)
ALT SERPL-CCNC: 13 U/L (ref 16–61)
ANION GAP SERPL CALC-SCNC: 8 MMOL/L (ref 3–18)
APTT PPP: 24 SEC (ref 23–36.4)
AST SERPL-CCNC: 12 U/L (ref 15–37)
BASOPHILS # BLD: 0 K/UL (ref 0–0.1)
BASOPHILS NFR BLD: 0 % (ref 0–2)
BILIRUB SERPL-MCNC: 0.2 MG/DL (ref 0.2–1)
BUN SERPL-MCNC: 35 MG/DL (ref 7–18)
BUN/CREAT SERPL: 24 (ref 12–20)
CALCIUM SERPL-MCNC: 8.3 MG/DL (ref 8.5–10.1)
CHLORIDE SERPL-SCNC: 104 MMOL/L (ref 100–108)
CO2 SERPL-SCNC: 28 MMOL/L (ref 21–32)
CREAT SERPL-MCNC: 1.45 MG/DL (ref 0.6–1.3)
DIFFERENTIAL METHOD BLD: ABNORMAL
EOSINOPHIL # BLD: 0.4 K/UL (ref 0–0.4)
EOSINOPHIL NFR BLD: 4 % (ref 0–5)
ERYTHROCYTE [DISTWIDTH] IN BLOOD BY AUTOMATED COUNT: 12.8 % (ref 11.6–14.5)
GLOBULIN SER CALC-MCNC: 2.9 G/DL (ref 2–4)
GLUCOSE SERPL-MCNC: 130 MG/DL (ref 74–99)
HCT VFR BLD AUTO: 26.7 % (ref 36–48)
HGB BLD-MCNC: 8.9 G/DL (ref 13–16)
INR PPP: 1.2 (ref 0.8–1.2)
LYMPHOCYTES # BLD: 1.9 K/UL (ref 0.9–3.6)
LYMPHOCYTES NFR BLD: 22 % (ref 21–52)
MCH RBC QN AUTO: 30.3 PG (ref 24–34)
MCHC RBC AUTO-ENTMCNC: 33.3 G/DL (ref 31–37)
MCV RBC AUTO: 90.8 FL (ref 74–97)
MONOCYTES # BLD: 0.7 K/UL (ref 0.05–1.2)
MONOCYTES NFR BLD: 8 % (ref 3–10)
NEUTS SEG # BLD: 5.6 K/UL (ref 1.8–8)
NEUTS SEG NFR BLD: 66 % (ref 40–73)
PLATELET # BLD AUTO: 229 K/UL (ref 135–420)
PMV BLD AUTO: 10.6 FL (ref 9.2–11.8)
POTASSIUM SERPL-SCNC: 4.1 MMOL/L (ref 3.5–5.5)
PROT SERPL-MCNC: 5.8 G/DL (ref 6.4–8.2)
PROTHROMBIN TIME: 14.8 SEC (ref 11.5–15.2)
RBC # BLD AUTO: 2.94 M/UL (ref 4.7–5.5)
SODIUM SERPL-SCNC: 140 MMOL/L (ref 136–145)
WBC # BLD AUTO: 8.5 K/UL (ref 4.6–13.2)

## 2018-02-15 PROCEDURE — 74011000250 HC RX REV CODE- 250: Performed by: EMERGENCY MEDICINE

## 2018-02-15 PROCEDURE — 85025 COMPLETE CBC W/AUTO DIFF WBC: CPT | Performed by: EMERGENCY MEDICINE

## 2018-02-15 PROCEDURE — 85730 THROMBOPLASTIN TIME PARTIAL: CPT | Performed by: EMERGENCY MEDICINE

## 2018-02-15 PROCEDURE — 85610 PROTHROMBIN TIME: CPT | Performed by: EMERGENCY MEDICINE

## 2018-02-15 PROCEDURE — 86920 COMPATIBILITY TEST SPIN: CPT | Performed by: EMERGENCY MEDICINE

## 2018-02-15 PROCEDURE — 86900 BLOOD TYPING SEROLOGIC ABO: CPT | Performed by: EMERGENCY MEDICINE

## 2018-02-15 PROCEDURE — C9113 INJ PANTOPRAZOLE SODIUM, VIA: HCPCS | Performed by: EMERGENCY MEDICINE

## 2018-02-15 PROCEDURE — 65270000029 HC RM PRIVATE

## 2018-02-15 PROCEDURE — 96374 THER/PROPH/DIAG INJ IV PUSH: CPT

## 2018-02-15 PROCEDURE — 74176 CT ABD & PELVIS W/O CONTRAST: CPT

## 2018-02-15 PROCEDURE — 80053 COMPREHEN METABOLIC PANEL: CPT | Performed by: EMERGENCY MEDICINE

## 2018-02-15 PROCEDURE — 74011250636 HC RX REV CODE- 250/636: Performed by: EMERGENCY MEDICINE

## 2018-02-15 PROCEDURE — 99285 EMERGENCY DEPT VISIT HI MDM: CPT

## 2018-02-15 RX ORDER — AMLODIPINE BESYLATE 10 MG/1
10 TABLET ORAL DAILY
Status: DISCONTINUED | OUTPATIENT
Start: 2018-02-16 | End: 2018-02-18 | Stop reason: HOSPADM

## 2018-02-15 RX ORDER — SODIUM CHLORIDE 9 MG/ML
50 INJECTION, SOLUTION INTRAVENOUS CONTINUOUS
Status: DISCONTINUED | OUTPATIENT
Start: 2018-02-15 | End: 2018-02-17

## 2018-02-15 RX ORDER — TAMSULOSIN HYDROCHLORIDE 0.4 MG/1
0.4 CAPSULE ORAL DAILY
Status: DISCONTINUED | OUTPATIENT
Start: 2018-02-16 | End: 2018-02-18 | Stop reason: HOSPADM

## 2018-02-15 RX ADMIN — SODIUM CHLORIDE 80 MG: 9 INJECTION, SOLUTION INTRAMUSCULAR; INTRAVENOUS; SUBCUTANEOUS at 19:57

## 2018-02-15 NOTE — IP AVS SNAPSHOT
[address] [place] [address] [address] Patient: Krystal Bai MRN: [id number] :1934 A check caitlyn indicates which time of day the medication should be taken. My Medications START taking these medications Instructions Each Dose to Equal  
 Morning Noon Evening Bedtime  
 acetaminophen 325 mg tablet Commonly known as:  TYLENOL Your last dose was: Your next dose is: Take 2 Tabs by mouth as needed for Fever. 650 mg  
    
   
   
   
  
 pantoprazole 40 mg tablet Commonly known as:  PROTONIX Your last dose was: Your next dose is: Take 1 Tab by mouth Before breakfast and dinner. 40 mg  
    
   
   
   
  
 sucralfate 100 mg/mL suspension Commonly known as:  Tamsen Lien Your last dose was: Your next dose is: Take 10 mL by mouth Before breakfast, lunch, dinner and at bedtime. 1 g CONTINUE taking these medications Instructions Each Dose to Equal  
 Morning Noon Evening Bedtime  
 amLODIPine 10 mg tablet Commonly known as:  [place] Your last dose was: Your next dose is:    
   
   
      
   
   
   
  
 cyclobenzaprine 10 mg tablet Commonly known as:  FLEXERIL Your last dose was: Your next dose is:    
   
   
 take 1 tablet by mouth three times a day if needed for muscle spasm  
     
   
   
   
  
 ferrous sulfate 325 mg (65 mg iron) tablet Your last dose was: Your next dose is:    
   
   
 take 1 tablet by mouth once daily  
     
   
   
   
  
 gabapentin 100 mg capsule Commonly known as:  NEURONTIN Your last dose was: Your next dose is: Take  by mouth three (3) times daily. tamsulosin 0.4 mg capsule Commonly known as:  FLOMAX Your last dose was: Your next dose is: Take 1 Cap by mouth daily. 0.4 mg  
    
   
   
   
  
  
STOP taking these medications   
 diclofenac 1 % Gel Commonly known as:  VOLTAREN  
   
  
 LOTREL 10-20 mg per capsule Generic drug:  amLODIPine-benazepril  
   
  
 methylPREDNISolone 4 mg tablet Commonly known as:  MEDROL DOSEPACK  
   
  
 naproxen 500 mg tablet Commonly known as:  NAPROSYN  
   
  
 traMADol 50 mg tablet Commonly known as:  ULTRAM  
   
  
  
  
Where to Get Your Medications Information on where to get these meds will be given to you by the nurse or doctor. ! Ask your nurse or doctor about these medications  
  acetaminophen 325 mg tablet  
 pantoprazole 40 mg tablet  
 sucralfate 100 mg/mL suspension

## 2018-02-15 NOTE — IP AVS SNAPSHOT
[id number] [address] Kiel Waggonerke Patient: Sarah Kimbrough MRN: [id number] :1934 About your hospitalization You were admitted on:  February 15, 2018 You last received care in the:  [facility] You were discharged on:  2018 Why you were hospitalized Your primary diagnosis was:  Esophageal Ulcer Your diagnoses also included:  Syncope, Gi Bleed, Anemia, Htn (Hypertension) Follow-up Information Follow up With Details Comments Contact Info Vashti Marrufo MD On 2018 Appointment with Dr. Dov Hoskins already set up. [address] [facility] [id number] [phone] Caroline Carmona MD In 10 days Office closed.  will call the office on Monday and then call patient with appointment. [address] [facility] 
[phone] Your Scheduled Appointments  11:30 AM EDT Follow Up with Lori Sellers MD  
[facility]) [address] [facility] [address] [phone] Discharge Orders None A check caitlyn indicates which time of day the medication should be taken. My Medications START taking these medications Instructions Each Dose to Equal  
 Morning Noon Evening Bedtime  
 acetaminophen 325 mg tablet Commonly known as:  TYLENOL Your last dose was: Your next dose is: Take 2 Tabs by mouth as needed for Fever. 650 mg  
    
   
   
   
  
 pantoprazole 40 mg tablet Commonly known as:  PROTONIX Your last dose was: Your next dose is: Take 1 Tab by mouth Before breakfast and dinner. 40 mg  
    
   
   
   
  
 sucralfate 100 mg/mL suspension Commonly known as:  Leitha [place] Your last dose was: Your next dose is: Take 10 mL by mouth Before breakfast, lunch, dinner and at bedtime. 1 g CONTINUE taking these medications Instructions Each Dose to Equal  
 Morning Noon Evening Bedtime  
 amLODIPine 10 mg tablet Commonly known as:  Remonia Grates Your last dose was: Your next dose is:    
   
   
      
   
   
   
  
 cyclobenzaprine 10 mg tablet Commonly known as:  FLEXERIL Your last dose was: Your next dose is:    
   
   
 take 1 tablet by mouth three times a day if needed for muscle spasm  
     
   
   
   
  
 ferrous sulfate 325 mg (65 mg iron) tablet Your last dose was: Your next dose is:    
   
   
 take 1 tablet by mouth once daily  
     
   
   
   
  
 gabapentin 100 mg capsule Commonly known as:  NEURONTIN Your last dose was: Your next dose is: Take  by mouth three (3) times daily. tamsulosin 0.4 mg capsule Commonly known as:  FLOMAX Your last dose was: Your next dose is: Take 1 Cap by mouth daily. 0.4 mg  
    
   
   
   
  
  
STOP taking these medications   
 diclofenac 1 % Gel Commonly known as:  VOLTAREN  
   
  
 LOTREL 10-20 mg per capsule Generic drug:  amLODIPine-benazepril  
   
  
 methylPREDNISolone 4 mg tablet Commonly known as:  MEDROL DOSEPACK  
   
  
 naproxen 500 mg tablet Commonly known as:  NAPROSYN  
   
  
 traMADol 50 mg tablet Commonly known as:  ULTRAM  
   
  
  
  
Where to Get Your Medications Information on where to get these meds will be given to you by the nurse or doctor. ! Ask your nurse or doctor about these medications  
  acetaminophen 325 mg tablet  
 pantoprazole 40 mg tablet  
 sucralfate 100 mg/mL suspension Discharge Instructions None Dipika Announcement  We are excited to announce that we are making your provider's discharge notes available to you in Visualead. You will see these notes when they are completed and signed by the physician that discharged you from your recent hospital stay. If you have any questions or concerns about any information you see in Visualead, please call the Health Information Department where you were seen or reach out to your Primary Care Provider for more information about your plan of care. Introducing [facility]! Heath Hernandez introduces Visualead patient portal. Now you can access parts of your medical record, email your doctor's office, and request medication refills online. 1. In your internet browser, go to [email or web address] 2. Click on the First Time User? Click Here link in the Sign In box. You will see the New Member Sign Up page. 3. Enter your Visualead Access Code exactly as it appears below. You will not need to use this code after youve completed the sign-up process. If you do not sign up before the expiration date, you must request a new code. · Visualead Access Code: [email or web address] Expires: 5/19/2018 10:42 AM 
 
4. Enter the last four digits of your Social Security Number (xxxx) and Date of Birth (mm/dd/yyyy) as indicated and click Submit. You will be taken to the next sign-up page. 5. Create a Visualead ID. This will be your Visualead login ID and cannot be changed, so think of one that is secure and easy to remember. 6. Create a Visualead password. You can change your password at any time. 7. Enter your Password Reset Question and Answer. This can be used at a later time if you forget your password. 8. Enter your e-mail address. You will receive e-mail notification when new information is available in [address] 9. Click Sign Up. You can now view and download portions of your medical record. 10. Click the Download Summary menu link to download a portable copy of your medical information. If you have questions, please visit the Frequently Asked Questions section of the MyChart website. Remember, [employer] is NOT to be used for urgent needs. For medical emergencies, dial 911. Now available from your iPhone and Android! Providers Seen During Your Hospitalization Provider Specialty Primary office phone Tereza Jesus MD Emergency Medicine [phone] Mango Fagan MD Family Practice [phone] Your Primary Care Physician (PCP) Primary Care Physician Office Phone Office Fax Lisa Amin [phone] You are allergic to the following No active allergies Recent Documentation Height Weight BMI Smoking Status 1.803 m 74.2 kg 22.81 kg/m2 Former Smoker Emergency Contacts Name Discharge Info Relation Home Work Mobile Ferdinand Birch (Granddaughter) DISCHARGE CAREGIVER [3] Other Relative [6] [phone] Joanie Reyes  Other Relative [6] [phone] Patient Belongings The following personal items are in your possession at time of discharge: 
  Dental Appliances: None  Visual Aid: None      Home Medications: Sent home   Jewelry: Ring (wedding band )  Clothing: At bedside, Footwear, Pants, Shirt, Socks, Undergarments    Other Valuables: Cell Phone  Personal Items Sent to Safe: none Please provide this summary of care documentation to your next provider. Signatures-by signing, you are acknowledging that this After Visit Summary has been reviewed with you and you have received a copy. Patient Signature:  ____________________________________________________________ Date:  ____________________________________________________________  
  
Larri Hazard Provider Signature:  ____________________________________________________________ Date:  ____________________________________________________________

## 2018-02-15 NOTE — Clinical Note
Status[de-identified] Inpatient [101] Type of Bed: Medical [8] Inpatient Hospitalization Certified Necessary for the Following Reasons: 3. Patient receiving treatment that can only be provided in an inpatient setting (further clarification in H&P documentation) Admitting Diagnosis: GI bleed [920479] Admitting Physician: Riley German Attending Physician: Riley German Estimated Length of Stay: 2 Midnights Discharge Plan[de-identified] Home with Office Follow-up

## 2018-02-15 NOTE — ED TRIAGE NOTES
Pt arrived via EMS alert & oriented times 4. Pt received with clothing full of blood. Pt assisted to get undressed.

## 2018-02-16 ENCOUNTER — ANESTHESIA (OUTPATIENT)
Dept: ENDOSCOPY | Age: 83
DRG: 381 | End: 2018-02-16
Payer: MEDICARE

## 2018-02-16 ENCOUNTER — ANESTHESIA EVENT (OUTPATIENT)
Dept: ENDOSCOPY | Age: 83
DRG: 381 | End: 2018-02-16
Payer: MEDICARE

## 2018-02-16 LAB
HCT VFR BLD AUTO: 22.7 % (ref 36–48)
HEMOCCULT STL QL: POSITIVE
HGB BLD-MCNC: 7.8 G/DL (ref 13–16)

## 2018-02-16 PROCEDURE — 65270000029 HC RM PRIVATE

## 2018-02-16 PROCEDURE — 77030018846 HC SOL IRR STRL H20 ICUM -A: Performed by: INTERNAL MEDICINE

## 2018-02-16 PROCEDURE — 74011250637 HC RX REV CODE- 250/637: Performed by: FAMILY MEDICINE

## 2018-02-16 PROCEDURE — 77030008565 HC TBNG SUC IRR ERBE -B: Performed by: INTERNAL MEDICINE

## 2018-02-16 PROCEDURE — 74011250636 HC RX REV CODE- 250/636

## 2018-02-16 PROCEDURE — 82272 OCCULT BLD FECES 1-3 TESTS: CPT | Performed by: FAMILY MEDICINE

## 2018-02-16 PROCEDURE — 76060000031 HC ANESTHESIA FIRST 0.5 HR: Performed by: INTERNAL MEDICINE

## 2018-02-16 PROCEDURE — 74011250636 HC RX REV CODE- 250/636: Performed by: FAMILY MEDICINE

## 2018-02-16 PROCEDURE — 85018 HEMOGLOBIN: CPT | Performed by: FAMILY MEDICINE

## 2018-02-16 PROCEDURE — 36415 COLL VENOUS BLD VENIPUNCTURE: CPT | Performed by: FAMILY MEDICINE

## 2018-02-16 PROCEDURE — 77030019988 HC FCPS ENDOSC DISP BSC -B: Performed by: INTERNAL MEDICINE

## 2018-02-16 PROCEDURE — 0DJ08ZZ INSPECTION OF UPPER INTESTINAL TRACT, VIA NATURAL OR ARTIFICIAL OPENING ENDOSCOPIC: ICD-10-PCS | Performed by: INTERNAL MEDICINE

## 2018-02-16 PROCEDURE — 76040000019: Performed by: INTERNAL MEDICINE

## 2018-02-16 PROCEDURE — 74011250637 HC RX REV CODE- 250/637: Performed by: INTERNAL MEDICINE

## 2018-02-16 RX ORDER — PROPOFOL 10 MG/ML
INJECTION, EMULSION INTRAVENOUS AS NEEDED
Status: DISCONTINUED | OUTPATIENT
Start: 2018-02-16 | End: 2018-02-16 | Stop reason: HOSPADM

## 2018-02-16 RX ORDER — INSULIN LISPRO 100 [IU]/ML
INJECTION, SOLUTION INTRAVENOUS; SUBCUTANEOUS ONCE
Status: DISCONTINUED | OUTPATIENT
Start: 2018-02-16 | End: 2018-02-16 | Stop reason: HOSPADM

## 2018-02-16 RX ORDER — ONDANSETRON 2 MG/ML
4 INJECTION INTRAMUSCULAR; INTRAVENOUS
Status: DISCONTINUED | OUTPATIENT
Start: 2018-02-16 | End: 2018-02-18 | Stop reason: HOSPADM

## 2018-02-16 RX ORDER — SUCRALFATE 1 G/10ML
1 SUSPENSION ORAL
Status: DISCONTINUED | OUTPATIENT
Start: 2018-02-16 | End: 2018-02-18 | Stop reason: HOSPADM

## 2018-02-16 RX ORDER — SODIUM CHLORIDE, SODIUM LACTATE, POTASSIUM CHLORIDE, CALCIUM CHLORIDE 600; 310; 30; 20 MG/100ML; MG/100ML; MG/100ML; MG/100ML
75 INJECTION, SOLUTION INTRAVENOUS CONTINUOUS
Status: DISCONTINUED | OUTPATIENT
Start: 2018-02-16 | End: 2018-02-16 | Stop reason: HOSPADM

## 2018-02-16 RX ORDER — SODIUM CHLORIDE 0.9 % (FLUSH) 0.9 %
5-10 SYRINGE (ML) INJECTION EVERY 8 HOURS
Status: DISCONTINUED | OUTPATIENT
Start: 2018-02-16 | End: 2018-02-16 | Stop reason: HOSPADM

## 2018-02-16 RX ORDER — SODIUM CHLORIDE 0.9 % (FLUSH) 0.9 %
5-10 SYRINGE (ML) INJECTION AS NEEDED
Status: DISCONTINUED | OUTPATIENT
Start: 2018-02-16 | End: 2018-02-16 | Stop reason: HOSPADM

## 2018-02-16 RX ORDER — ACETAMINOPHEN 325 MG/1
650 TABLET ORAL
Status: DISCONTINUED | OUTPATIENT
Start: 2018-02-16 | End: 2018-02-17

## 2018-02-16 RX ORDER — PANTOPRAZOLE SODIUM 40 MG/1
40 TABLET, DELAYED RELEASE ORAL
Status: DISCONTINUED | OUTPATIENT
Start: 2018-02-16 | End: 2018-02-18 | Stop reason: HOSPADM

## 2018-02-16 RX ADMIN — SUCRALFATE 1 G: 1 SUSPENSION ORAL at 22:34

## 2018-02-16 RX ADMIN — AMLODIPINE BESYLATE 10 MG: 10 TABLET ORAL at 08:10

## 2018-02-16 RX ADMIN — ACETAMINOPHEN 650 MG: 325 TABLET, FILM COATED ORAL at 02:27

## 2018-02-16 RX ADMIN — ACETAMINOPHEN 650 MG: 325 TABLET, FILM COATED ORAL at 17:00

## 2018-02-16 RX ADMIN — SODIUM CHLORIDE 100 ML/HR: 900 INJECTION, SOLUTION INTRAVENOUS at 17:21

## 2018-02-16 RX ADMIN — TAMSULOSIN HYDROCHLORIDE 0.4 MG: 0.4 CAPSULE ORAL at 08:10

## 2018-02-16 RX ADMIN — SUCRALFATE 1 G: 1 SUSPENSION ORAL at 17:01

## 2018-02-16 RX ADMIN — PANTOPRAZOLE SODIUM 40 MG: 40 TABLET, DELAYED RELEASE ORAL at 17:01

## 2018-02-16 RX ADMIN — SODIUM CHLORIDE 100 ML/HR: 900 INJECTION, SOLUTION INTRAVENOUS at 06:01

## 2018-02-16 RX ADMIN — SODIUM CHLORIDE 100 ML/HR: 900 INJECTION, SOLUTION INTRAVENOUS at 03:28

## 2018-02-16 RX ADMIN — PROPOFOL 20 MG: 10 INJECTION, EMULSION INTRAVENOUS at 13:47

## 2018-02-16 RX ADMIN — PROPOFOL 20 MG: 10 INJECTION, EMULSION INTRAVENOUS at 13:49

## 2018-02-16 RX ADMIN — ACETAMINOPHEN 650 MG: 325 TABLET, FILM COATED ORAL at 08:10

## 2018-02-16 RX ADMIN — PROPOFOL 20 MG: 10 INJECTION, EMULSION INTRAVENOUS at 13:46

## 2018-02-16 RX ADMIN — PROPOFOL 20 MG: 10 INJECTION, EMULSION INTRAVENOUS at 13:48

## 2018-02-16 NOTE — H&P
History and Physical    Patient: Chad Whitney MRN: [id number]  SSN: [id number]    YOB: 1934  Age: 80 y.o. Sex: male      Subjective:      Chad Whitney is a 80 y.o. male who began having hematemesis yesterday evening with abdominal pain. SEen in ER with noted coffeeground emesis and drop in HCT. Admit for acute upper GI bleed with anemia. 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 Mosley Boys Left 6/2015      Family History   Problem Relation Age of Onset    Family history unknown: Yes     Social History   Substance Use Topics    Smoking status: Former Smoker     Packs/day: 1.00     Years: 25.00     Quit date: 6/1/1990    Smokeless tobacco: Never Used    Alcohol use 0.5 oz/week     1 Shots of liquor per week      Prior to Admission medications    Medication Sig Start Date End Date Taking? Authorizing Provider   naproxen (NAPROSYN) 500 mg tablet take 1 tablet by mouth twice a day with meals 2/11/18  Yes Jamie Beach NP   amLODIPine (NORVASC) 10 mg tablet  7/25/17  Yes Historical Provider   cyclobenzaprine (FLEXERIL) 10 mg tablet take 1 tablet by mouth three times a day if needed for muscle spasm 9/11/17  Yes Anjali De Santiago PA-C   traMADol (ULTRAM) 50 mg tablet Take 1 Tab by mouth every six (6) hours as needed for Pain. Max Daily Amount: 200 mg. 7/26/17  Yes Anjali De Santiago PA-C   ferrous sulfate 325 mg (65 mg iron) tablet take 1 tablet by mouth once daily 5/25/17  Yes Historical Provider   tamsulosin (FLOMAX) 0.4 mg capsule Take 1 Cap by mouth daily.  6/22/17  Yes Conner Mccallum MD   gabapentin (NEURONTIN) 100 mg capsule Take  by mouth three (3) times daily. Yes Historical Provider   amLODIPine-benazepril (LOTREL) 10-20 mg per capsule Take 1 Cap by mouth daily. Yes Historical Provider   diclofenac (VOLTAREN) 1 % gel Apply 4 g to affected area four (4) times daily. 9/7/17   Isidro Sosa MD   methylPREDNISolone (MEDROL DOSEPACK) 4 mg tablet Per dose pack instructions 6/2/17   Arlene Novak PA-C        No Known Allergies    Review of Systems:  Review of systems not obtained due to patient factors. Objective:     Vitals:    02/16/18 1355 02/16/18 1403 02/16/18 1406 02/16/18 1413   BP: 110/51 111/40 110/51 113/48   Pulse: 65 63 66 60   Resp: 17 15 20 15   Temp: 98.6 °F (37 °C)  98.7 °F (37.1 °C)    SpO2: 100% 100% 100% 100%   Weight:       Height:            Physical Exam:  GENERAL: alert, cooperative, mild distress, appears older than stated age  LUNG: clear to auscultation bilaterally  HEART: regular rate and rhythm, S1, S2 normal, no murmur, click, rub or gallop  ABDOMEN: soft, non-tender. Bowel sounds normal. No masses,  no organomegaly    Assessment:     Hospital Problems  Date Reviewed: 2/16/2018          Codes Class Noted POA    Syncope ICD-10-CM: R55  ICD-9-CM: 780.2  2/15/2018 Unknown        GI bleed ICD-10-CM: K92.2  ICD-9-CM: 578.9  2/15/2018 Unknown        Anemia ICD-10-CM: D64.9  ICD-9-CM: 285.9  2/15/2018 Unknown        HTN (hypertension) (Chronic) ICD-10-CM: I10  ICD-9-CM: 401.9  6/10/2015 Yes              Plan:     Admit  GI consult protonix.     Signed By: Malachi Jones MD     February 16, 2018

## 2018-02-16 NOTE — CONSULTS
[email or web address]  [phone]    Impression:   1. Hematemesis  2. Acute blood loss anemia  3. Chronic NSAID use      Plan:     1. EGD today  2. Continue PPI    Further recommendations pending findings on endoscopy. Chief Complaint: hematemesis      HPI:  Alejandra Goff is a 80 y.o. male who is being seen on consult for several episodes of hematemesis yesterday evening prompting visit to ED, where he was noted to be anemic with hgb 8.9. Denies melena or change in bowel habits. He is not on a blood thinner, but does take NSAIDs chronically (BC Powder, Aleve, Motrin and Advil). No prior EGD or colonoscopy. Denies epigastric pain or significant heartburn. 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 Lola Infante Left 6/2015       Social HX:   Social History     Social History    Marital status:      Spouse name: N/A    Number of children: N/A    Years of education: N/A     Occupational History    Not on file. Social History Main Topics    Smoking status: Former Smoker     Packs/day: 1.00     Years: 25.00     Quit date: 6/1/1990    Smokeless tobacco: Never Used    Alcohol use 0.5 oz/week     1 Shots of liquor per week    Drug use: No    Sexual activity: Not on file     Other Topics Concern    Not on file     Social History Narrative       FHX:   Family History   Problem Relation Age of Onset    Family history unknown:  Yes       Allergy:   No Known Allergies    Home Medications:     Prescriptions Prior to Admission   Medication Sig    naproxen (NAPROSYN) 500 mg tablet take 1 tablet by mouth twice a day with meals    amLODIPine (NORVASC) 10 mg tablet     cyclobenzaprine (FLEXERIL) 10 mg tablet take 1 tablet by mouth three times a day if needed for muscle spasm    traMADol (ULTRAM) 50 mg tablet Take 1 Tab by mouth every six (6) hours as needed for Pain. Max Daily Amount: 200 mg.  ferrous sulfate 325 mg (65 mg iron) tablet take 1 tablet by mouth once daily    tamsulosin (FLOMAX) 0.4 mg capsule Take 1 Cap by mouth daily.  gabapentin (NEURONTIN) 100 mg capsule Take  by mouth three (3) times daily.  amLODIPine-benazepril (LOTREL) 10-20 mg per capsule Take 1 Cap by mouth daily.  diclofenac (VOLTAREN) 1 % gel Apply 4 g to affected area four (4) times daily.  methylPREDNISolone (MEDROL DOSEPACK) 4 mg tablet Per dose pack instructions       Review of Systems:     A fourteen point review of systems was obtained, and was negative except per HPI. Visit Vitals    /61 (BP 1 Location: Left arm, BP Patient Position: At rest)    Pulse 67    Temp 98.2 °F (36.8 °C)    Resp 16    Ht 5' 11\" (1.803 m)    Wt 73.9 kg (163 lb)    SpO2 98%    BMI 22.73 kg/m2       Physical Assessment:     constitutional: appearance: well developed, well nourished, in no acute distress. skin: no rashes, jaundice  eyes: inspection: normal conjunctivae and lids; no scleral icterus pupils: equal, round, reactive to light; extraocular movements intact  ENMT: mouth: mucous membranes moist, no thrush  neck:  no masses or tenderness; normal range of motion  respiratory: breath sounds clear, no wheeze/rales/rhonchi  cardiovascular: normal rate, regular rhythm without murmur  abdominal: soft, bowel sounds present, non-tender to palpation without rebound or guarding; no appreciable mass; no appreciable hepatosplenomegaly; no appreciable fluid wave  extremities: no clubbing, cyanosis, edema  neurologic:  Cranial nerves II-XII grossly intact; no asterixis   psychiatric:  oriented to time, space and person.         Basic Metabolic Profile Recent Labs      02/15/18   1900   NA  140   K  4.1   CL  104   CO2  28   BUN  35*   GLU  130*   CA  8.3*         CBC w/Diff    Recent Labs      02/15/18   1900   WBC  8.5   RBC  2.94*   HGB  8.9*   HCT  26.7*   MCV  90.8   MCH  30.3   MCHC  33.3   RDW  12.8   PLT  229    Recent Labs      02/15/18   1900   GRANS  66   LYMPH  22   EOS  4        Hepatic Function   Recent Labs      02/15/18   1900   ALB  2.9*   TP  5.8*   TBILI  0.2   SGOT  12*   AP  61          Nancy Meneses MD  [facility] [address]  cell - [phone]  [email or web address]

## 2018-02-16 NOTE — PROGRESS NOTES
conducted an initial consultation and Spiritual Assessment for Mariam Davenport, who is a 80 y.o.,male. Patients Primary Language is: [place]. According to the patients EMR Confucianism Affiliation is: [place]. The reason the Patient came to the hospital is:   Patient Active Problem List    Diagnosis Date Noted    Syncope 02/15/2018    GI bleed 02/15/2018    Anemia 02/15/2018    Synovial cyst, RT L4-5 10/12/2017    Spinal stenosis of lumbar region 12/08/2016    Hip arthritis 02/02/2016    Left hip pain     Wears glasses     Retention of urine 06/25/2015    HTN (hypertension) 06/10/2015    Osteoarthrosis, unspecified whether generalized or localized, pelvic region and thigh 06/09/2015        The  provided the following Interventions:  Initiated a relationship of care and support. Provided information about Spiritual Care Services. Chart reviewed. The following outcomes where achieved:  Patient expressed gratitude for 's visit. Assessment:  Patient does not have any Denominational/cultural needs that will affect patients preferences in health care. There are no spiritual or Denominational issues which require intervention at this time. Plan:  Chaplains will continue to follow and will provide pastoral care on an as needed/requested basis.  recommends bedside caregivers page  on duty if patient shows signs of acute spiritual or emotional distress.     [address]  ([phone])

## 2018-02-16 NOTE — ROUTINE PROCESS
TRANSFER - IN REPORT:    Verbal report received from [employer] (name) on Samira Winslow  being received from ED (unit) for routine progression of care      Report consisted of patients Situation, Background, Assessment and   Recommendations(SBAR). Information from the following report(s) ED Summary was reviewed with the receiving nurse. Opportunity for questions and clarification was provided. Assessment completed upon patients arrival to unit and care assumed.

## 2018-02-16 NOTE — ANESTHESIA POSTPROCEDURE EVALUATION
Post-Anesthesia Evaluation and Assessment    Patient: Danae Kendrick MRN: [id number]  SSN: [id number]    YOB: 1934  Age: 80 y.o. Sex: male      Data from PACU flowsheet    Cardiovascular Function/Vital Signs  Visit Vitals    /48    Pulse 60    Temp 37.1 °C (98.7 °F)    Resp 15    Ht 5' 11\" (1.803 m)    Wt 73.9 kg (163 lb)    SpO2 100%    BMI 22.73 kg/m2       Patient is status post MAC anesthesia for Procedure(s):  ENDOSCOPY. Nausea/Vomiting: controlled    Postoperative hydration reviewed and adequate. Pain:  Pain Scale 1: Numeric (0 - 10) (02/16/18 1355)  Pain Intensity 1: 0 (02/16/18 1355)   Managed      Mental Status and Level of Consciousness: Alert and oriented     Pulmonary Status:   O2 Device: Room air (02/16/18 1406)   Adequate oxygenation and airway patent    Complications related to anesthesia: None    Post-anesthesia assessment completed.  No concerns    Signed By: Lisbeth Fernandez MD     February 16, 2018

## 2018-02-16 NOTE — PHYSICIAN ADVISORY
Letter of Admission Status Determination    Sarah Kimbrough   Age: 80 y.o. MRN: [id number]    Date of admission: 2/15/2018    I have reviewed this case as it involves a patient admitted as Inpatient that does not meet criteria for Inpatient admission. The patient had medical necessity for hospitalization based on his presenting condition, concern for bleeding, with need for further evaluation and clinical monitoring. On day 2, the patient has stable vitals, no documentation of worsening hemoglobin or persistent bleeding, EGD showed esophagitis without active bleeding, discharge is anticipated tomorrow, and the current documented care plan does not support continued medically necessary hospitalization at the inpatient level. Therefore, unless continued medically necessary hospitalization at the inpatient level of care is anticipated, I recommend that this patient be downgraded to OBSERVATION status. The final decision regarding the patient's hospitalization status depends on the attending physician's judgment.       Rodney Gee MD, LEANNE, [address] [facility]  Physician Antwon Jaime.  [phone]    February 16, 2018   3:04 PM

## 2018-02-16 NOTE — PERIOP NOTES
TRANSFER - OUT REPORT:    Verbal report given to [facility] RN on Edman Height  being transferred to [facility] for routine post - op       Report consisted of patients Situation, Background, Assessment and   Recommendations(SBAR). Information from the following report(s) SBAR, Procedure Summary, Intake/Output, MAR and Recent Results was reviewed with the receiving nurse. Lines:   Peripheral IV 02/16/18 Right Antecubital (Active)   Site Assessment Clean, dry, & intact 2/16/2018  1:55 PM   Phlebitis Assessment 0 2/16/2018  1:55 PM   Infiltration Assessment 0 2/16/2018  1:55 PM   Dressing Status Clean, dry, & intact 2/16/2018  1:55 PM   Dressing Type Transparent 2/16/2018  1:55 PM   Hub Color/Line Status Pink; Infusing;Patent 2/16/2018  1:55 PM        Opportunity for questions and clarification was provided.       Patient transported with:   Registered Nurse  Tech

## 2018-02-16 NOTE — ANESTHESIA PREPROCEDURE EVALUATION
Anesthetic History   No history of anesthetic complications            Review of Systems / Medical History  Patient summary reviewed and pertinent labs reviewed    Pulmonary  Within defined limits                 Neuro/Psych   Within defined limits           Cardiovascular    Hypertension              Exercise tolerance: >4 METS     GI/Hepatic/Renal  Within defined limits              Endo/Other        Arthritis     Other Findings   Comments: Documentation of current medication  Current medications obtained, documented and obtained? YES      Risk Factors for Postoperative nausea/vomiting:       History of postoperative nausea/vomiting? NO       Female? NO       Motion sickness? NO       Intended opioid administration for postoperative analgesia? NO      Smoking Abstinence:  Current Smoker? NO  Elective Surgery? YES  Seen preoperatively by anesthesiologist or proxy prior to day of surgery? YES  Pt abstained from smoking 24 hours prior to anesthesia?  YES    Preventive care/screening for High Blood Pressure:  Aged 18 years and older: YES  Screened for high blood pressure: YES  Patients with high blood pressure referred to primary care provider   for BP management: YES                     Physical Exam    Airway  Mallampati: II  TM Distance: 4 - 6 cm  Neck ROM: normal range of motion   Mouth opening: Normal     Cardiovascular    Rhythm: regular  Rate: normal         Dental  No notable dental hx       Pulmonary  Breath sounds clear to auscultation               Abdominal  GI exam deferred       Other Findings            Anesthetic Plan    ASA: 3  Anesthesia type: MAC          Induction: Intravenous  Anesthetic plan and risks discussed with: Patient

## 2018-02-16 NOTE — ROUTINE PROCESS
Bedside shift change report given to Chato Max RN (oncoming nurse) by Og Caballero RN (offgoing nurse). Report included the following information SBAR.

## 2018-02-16 NOTE — ED PROVIDER NOTES
EMERGENCY DEPARTMENT HISTORY AND PHYSICAL EXAM    8:01 PM      Date: 2/15/2018  Patient Name: Sunni Arora    History of Presenting Illness     Chief Complaint   Patient presents with    Flank Pain    Hemoptysis         History Provided By: Patient and pt's granddaughters    Chief Complaint: Hematemesis  Duration: 2 Hours  Timing:  Acute  Location: N/a  Quality: coffee ground emesis  Severity: N/a  Modifying Factors: None  Associated Symptoms: flank pain      Additional History (Context): Sunni Arora is a 80 y.o. male with arthritis, HTN, and chronic pain who presents with acute coffee ground colored hematemesis that started 2 hours ago. Pt states the last thing he remembers before waking up on the floor in his own emesis is sitting at the kitchen table and took some Robitussin. Per pt's granddaughter's, the pt was found slumped to the side unresponsive staring at her stat the kitchen table covered in emesis. The granddaughter and her boyfriend lowered the pt to the floor where where he began crawling towards the bathroom and continued to vomit into the toilet. Pt denies diarrhea and complained of left sided flank pain. No other concerns, modifying factors, or symptoms were expressed by the pt at this time.       PCP: Roxanna Mckinley MD    Current Facility-Administered Medications   Medication Dose Route Frequency Provider Last Rate Last Dose    [START ON 2/16/2018] amLODIPine (NORVASC) tablet 10 mg  10 mg Oral DAILY Roxanna Mckinley MD        [START ON 2/16/2018] tamsulosin (FLOMAX) capsule 0.4 mg  0.4 mg Oral DAILY Roxanna Mckinley MD        0.9% sodium chloride infusion  100 mL/hr IntraVENous CONTINUOUS Roxanna Mckinley MD         Current Outpatient Prescriptions   Medication Sig Dispense Refill    naproxen (NAPROSYN) 500 mg tablet take 1 tablet by mouth twice a day with meals 60 Tab 2    amLODIPine (NORVASC) 10 mg tablet   0    gabapentin (NEURONTIN) 100 mg capsule Take 1 Cap by mouth two (2) times a day. 60 Cap 5    cyclobenzaprine (FLEXERIL) 10 mg tablet take 1 tablet by mouth three times a day if needed for muscle spasm 60 Tab 1    diclofenac (VOLTAREN) 1 % gel Apply 4 g to affected area four (4) times daily. 100 g 5    traMADol (ULTRAM) 50 mg tablet Take 1 Tab by mouth every six (6) hours as needed for Pain. Max Daily Amount: 200 mg. 30 Tab 0    ferrous sulfate 325 mg (65 mg iron) tablet take 1 tablet by mouth once daily  0    tamsulosin (FLOMAX) 0.4 mg capsule Take 1 Cap by mouth daily. 90 Cap 3    methylPREDNISolone (MEDROL DOSEPACK) 4 mg tablet Per dose pack instructions 1 Dose Pack 0    gabapentin (NEURONTIN) 100 mg capsule Take 1 Cap by mouth three (3) times daily. 90 Cap 2    gabapentin (NEURONTIN) 100 mg capsule Take  by mouth three (3) times daily.  amLODIPine-benazepril (LOTREL) 10-20 mg per capsule Take 1 Cap by mouth daily. Past History     Past Medical History:  Past Medical History:   Diagnosis Date    Arthritis     Chronic pain     left hip and lower back    Hypertension     Left hip pain     resolved since surgery    Thromboembolus ([address] 1975    brain, [facility] Wears glasses        Past Surgical History:  Past Surgical History:   Procedure Laterality Date    FOOT/TOES SURGERY PROC UNLISTED      HX HIP REPLACEMENT      HX OTHER SURGICAL  1975    brain surgery, Dr. Cosme Schaumann Left 6/2015       Family History:  Family History   Problem Relation Age of Onset    Family history unknown: Yes       Social History:  Social History   Substance Use Topics    Smoking status: Former Smoker     Packs/day: 1.00     Years: 25.00     Quit date: 6/1/1990    Smokeless tobacco: Never Used    Alcohol use 0.5 oz/week     1 Shots of liquor per week       Allergies:  No Known Allergies      Review of Systems     Review of Systems   Constitutional: Negative for diaphoresis and fever. Respiratory: Negative for shortness of breath.     Cardiovascular: Negative for chest pain. Gastrointestinal: Positive for vomiting (hematemesis). Negative for diarrhea. Genitourinary: Positive for flank pain (left sided). All other systems reviewed and are negative. Physical Exam     Visit Vitals    /49    Pulse 60    Temp 98.2 °F (36.8 °C)    Resp 12    Ht 5' 11\" (1.803 m)    Wt 74.8 kg (165 lb)    SpO2 100%    BMI 23.01 kg/m2     Physical Exam:  . Patient Vitals for the past 12 hrs:   Temp Pulse Resp BP SpO2   02/15/18 2130 - 60 12 123/49 100 %   02/15/18 2115 - (!) 57 15 120/51 98 %   02/15/18 2045 - 63 13 116/66 100 %   02/15/18 2015 - (!) 58 14 117/47 99 %   02/15/18 1945 - 61 18 107/47 100 %   02/15/18 1849 98.2 °F (36.8 °C) 71 18 112/51 100 %     Gen: Well developed, well nourished, clothes covered in vomit 80 y.o. male  HEENT: Normocephalic, atraumatic  Respiratory: No accessory muscle use No wheeze, No rales, No rhonchi. Normal chest wall excursion. No subcutaneous air, no rib crepitus  Cardiovascular: Regular rhythm and rate, Normal pulses, Normal perfusion. No edema  Gastrointestinal: Non distended, Non tender, No masses. No ascites. No organomegaly. No evidence of trauma  Musculoskeletal: Full range of motion at all other tested joints. No joint effusions. Neuro: Normal strength, Normal sensation. Normal speech. No ataxia. Cranial Nerves II-XII normal as tested. Skin: No rash, petechia, or purpura. Warm and dry. Psyche: No suicidal ideation, No homicidal ideation. No hallucinations. Organized thoughts.   Heme: Normal  : Deferred        Diagnostic Study Results     Labs -  Recent Results (from the past 12 hour(s))   CBC WITH AUTOMATED DIFF    Collection Time: 02/15/18  7:00 PM   Result Value Ref Range    WBC 8.5 4.6 - 13.2 K/uL    RBC 2.94 (L) 4.70 - 5.50 M/uL    HGB 8.9 (L) 13.0 - 16.0 g/dL    HCT 26.7 (L) 36.0 - 48.0 %    MCV 90.8 74.0 - 97.0 FL    MCH 30.3 24.0 - 34.0 PG    MCHC 33.3 31.0 - 37.0 g/dL    RDW 12.8 11.6 - 14.5 % PLATELET 469 985 - 586 K/uL    MPV 10.6 9.2 - 11.8 FL    NEUTROPHILS 66 40 - 73 %    LYMPHOCYTES 22 21 - 52 %    MONOCYTES 8 3 - 10 %    EOSINOPHILS 4 0 - 5 %    BASOPHILS 0 0 - 2 %    ABS. NEUTROPHILS 5.6 1.8 - 8.0 K/UL    ABS. LYMPHOCYTES 1.9 0.9 - 3.6 K/UL    ABS. MONOCYTES 0.7 0.05 - 1.2 K/UL    ABS. EOSINOPHILS 0.4 0.0 - 0.4 K/UL    ABS. BASOPHILS 0.0 0.0 - 0.1 K/UL    DF AUTOMATED     METABOLIC PANEL, COMPREHENSIVE    Collection Time: 02/15/18  7:00 PM   Result Value Ref Range    Sodium 140 136 - 145 mmol/L    Potassium 4.1 3.5 - 5.5 mmol/L    Chloride 104 100 - 108 mmol/L    CO2 28 21 - 32 mmol/L    Anion gap 8 3.0 - 18 mmol/L    Glucose 130 (H) 74 - 99 mg/dL    BUN 35 (H) 7.0 - 18 MG/DL    Creatinine 1.45 (H) 0.6 - 1.3 MG/DL    BUN/Creatinine ratio 24 (H) 12 - 20      GFR est AA 56 (L) >60 ml/min/1.73m2    GFR est non-AA 46 (L) >60 ml/min/1.73m2    Calcium 8.3 (L) 8.5 - 10.1 MG/DL    Bilirubin, total 0.2 0.2 - 1.0 MG/DL    ALT (SGPT) 13 (L) 16 - 61 U/L    AST (SGOT) 12 (L) 15 - 37 U/L    Alk. phosphatase 61 45 - 117 U/L    Protein, total 5.8 (L) 6.4 - 8.2 g/dL    Albumin 2.9 (L) 3.4 - 5.0 g/dL    Globulin 2.9 2.0 - 4.0 g/dL    A-G Ratio 1.0 0.8 - 1.7     PROTHROMBIN TIME + INR    Collection Time: 02/15/18  7:00 PM   Result Value Ref Range    Prothrombin time 14.8 11.5 - 15.2 sec    INR 1.2 0.8 - 1.2     PTT    Collection Time: 02/15/18  7:00 PM   Result Value Ref Range    aPTT 24.0 23.0 - 36.4 SEC   TYPE & SCREEN    Collection Time: 02/15/18  7:00 PM   Result Value Ref Range    Crossmatch Expiration 02/18/2018     ABO/Rh(D) O POSITIVE     Antibody screen NEG        Radiologic Studies -   CT ABD PELV WO CONT   Final Result   IMPRESSION:     No CT finding for an acute intra-abdominal or pelvic process.     Stable anatomy with right colon positioning in the mid and left abdomen. Appendix does not appear inflamed.     Pandiverticulosis without findings for diverticulitis.     Small hiatal hernia.  Fluid in the stomach likely related to ingested material.     Grossly stable low-attenuation liver foci likely cysts. Stable left adrenal  nodule.     Atherosclerotic disease     Stable areas of pleural thickening likely pleural plaques. Medical Decision Making   I am the first provider for this patient. I reviewed the vital signs, available nursing notes, past medical history, past surgical history, family history and social history. Vital Signs-Reviewed the patient's vital signs. Records Reviewed: Nursing Notes and Old Medical Records (Time of Review: 8:01 PM)    ED Course: Progress Notes, Reevaluation, and Consults:  11:24 PM Consult: I discussed care with Dr. Gloria Leal, Hospitalist.  It was a standard discussion including patient history, chief complaint, available diagnostic results, and predicted treatment course. Dr. Gloria Leal will accept the pt. Provider Notes (Medical Decision Making): For Hospitalized Patients:    1. Hospitalization Decision Time:  The decision to hospitalize the patient was made by Dr. Gloria Leal at 11:24 PM on 2/15/2018    2. Aspirin: Aspirin was not given because the patient did not present with a stroke at the time of their Emergency Department evaluation    Diagnosis     Clinical Impression:   1. Syncope and collapse        Disposition: Admitted    Follow-up Information     None           Patient's Medications   Start Taking    No medications on file   Continue Taking    AMLODIPINE (NORVASC) 10 MG TABLET        AMLODIPINE-BENAZEPRIL (LOTREL) 10-20 MG PER CAPSULE    Take 1 Cap by mouth daily. CYCLOBENZAPRINE (FLEXERIL) 10 MG TABLET    take 1 tablet by mouth three times a day if needed for muscle spasm    DICLOFENAC (VOLTAREN) 1 % GEL    Apply 4 g to affected area four (4) times daily. FERROUS SULFATE 325 MG (65 MG IRON) TABLET    take 1 tablet by mouth once daily    GABAPENTIN (NEURONTIN) 100 MG CAPSULE    Take  by mouth three (3) times daily.     GABAPENTIN (NEURONTIN) 100 MG CAPSULE    Take 1 Cap by mouth three (3) times daily. GABAPENTIN (NEURONTIN) 100 MG CAPSULE    Take 1 Cap by mouth two (2) times a day. METHYLPREDNISOLONE (MEDROL DOSEPACK) 4 MG TABLET    Per dose pack instructions    NAPROXEN (NAPROSYN) 500 MG TABLET    take 1 tablet by mouth twice a day with meals    TAMSULOSIN (FLOMAX) 0.4 MG CAPSULE    Take 1 Cap by mouth daily. TRAMADOL (ULTRAM) 50 MG TABLET    Take 1 Tab by mouth every six (6) hours as needed for Pain. Max Daily Amount: 200 mg. These Medications have changed    No medications on file   Stop Taking    No medications on file     _______________________________    Attestations:  Scribe Attestation     Chelo Huerta acting as a scribe for and in the presence of Marion Lu MD      February 15, 2018 at 8:01 PM       Provider Attestation:      I personally performed the services described in the documentation, reviewed the documentation, as recorded by the scribe in my presence, and it accurately and completely records my words and actions.  February 15, 2018 at 8:01 PM - Haroldo Trevizo MD    _______________________________

## 2018-02-16 NOTE — ROUTINE PROCESS
Bedside and Verbal shift change report given to Jovanna AMATO (oncoming nurse) by Pal Barbour RN(offgoing nurse). Report included the following information Kardex, Intake/Output, MAR and Recent Results.

## 2018-02-16 NOTE — PROCEDURES
[email or web address]  [phone]     Endoscopic Gastroduodenoscopy Procedure Note    Deion [facility]  1934  [id number]    Indication: 1. Hematemesis  2. Acute blood loss anemia     : Emmy Brannon MD    Referring Provider:  Tony Fox MD    Anesthesia/Sedation:  MAC anesthesia Propofol      Procedure Details     After infomed consent was obtained for the procedure, with all risks and benefits of procedure explained the patient was taken to the endoscopy suite and placed in the left lateral decubitus position. Following sequential administration of sedation as per above, the endoscope was inserted into the mouth and advanced under direct vision to second portion of the duodenum. A careful inspection was made as the gastroscope was withdrawn, including a retroflexed view of the proximal stomach; findings and interventions are described below. Findings:   Esophagus[de-identified] Medium-sized sliding hiatal hernia, z-line at 35 cm and hiatal narrowing at 38 cm; LA Grade C esophagitis with ulceration and stigmata of recent hemorrhage, no active bleeding  Stomach: normal   Duodenum/jejunum: normal    Therapies:  none    Specimens: * No specimens in log *           Complications:   None; patient tolerated the procedure well. EBL:  None. Impression:    1. hiatal hernia   2. LA Grade C esophagitis with ulceration. Recommendations:  - Pantoprazole 40 mg PO BID x 8 weeks, then daily thereafter  - Carafate suspension four times daily x 14 days  - Avoid ALL NSAIDs  - Clear liquid diet, advance as tolerated  - Monitor H/H and transfuse as indicated  - Should H/H remains stable overnight without recurrent hemorrhage, and patient tolerating diet, may be discharged home tomorrow from GI standpoint.     Emmy Brannon MD  2/16/2018  1:50 PM    Emmy Brannon MD  [facility] 1947, [address] [facility] [address] - [phone]  Cell - [phone]  [email or web address]

## 2018-02-16 NOTE — ROUTINE PROCESS
TRANSFER - OUT REPORT:    Verbal report given to LM Kat on Abhilash Peña  being transferred to [employer] (unit) for routine progression of care       Report consisted of patients Situation, Background, Assessment and   Recommendations(SBAR). Information from the following report(s) SBAR and ED Summary was reviewed with the receiving nurse. Lines:   Peripheral IV 02/16/18 Right Antecubital (Active)   Site Assessment Clean, dry, & intact 2/16/2018 12:33 AM   Phlebitis Assessment 0 2/16/2018 12:33 AM   Infiltration Assessment 0 2/16/2018 12:33 AM   Dressing Status Clean, dry, & intact 2/16/2018 12:33 AM   Dressing Type Transparent 2/16/2018 12:33 AM   Hub Color/Line Status Pink;Flushed;Patent 2/16/2018 12:33 AM        Opportunity for questions and clarification was provided.       Patient transported with:   [employer]

## 2018-02-17 PROBLEM — K22.10 ESOPHAGEAL ULCER: Status: ACTIVE | Noted: 2018-02-17

## 2018-02-17 LAB
HCT VFR BLD AUTO: 22.4 % (ref 36–48)
HCT VFR BLD AUTO: 31.4 % (ref 36–48)
HGB BLD-MCNC: 10.6 G/DL (ref 13–16)
HGB BLD-MCNC: 7.3 G/DL (ref 13–16)

## 2018-02-17 PROCEDURE — P9016 RBC LEUKOCYTES REDUCED: HCPCS | Performed by: EMERGENCY MEDICINE

## 2018-02-17 PROCEDURE — 30233P1 TRANSFUSION OF NONAUTOLOGOUS FROZEN RED CELLS INTO PERIPHERAL VEIN, PERCUTANEOUS APPROACH: ICD-10-PCS | Performed by: FAMILY MEDICINE

## 2018-02-17 PROCEDURE — 74011250637 HC RX REV CODE- 250/637: Performed by: INTERNAL MEDICINE

## 2018-02-17 PROCEDURE — 85018 HEMOGLOBIN: CPT | Performed by: FAMILY MEDICINE

## 2018-02-17 PROCEDURE — 74011250637 HC RX REV CODE- 250/637: Performed by: FAMILY MEDICINE

## 2018-02-17 PROCEDURE — 36430 TRANSFUSION BLD/BLD COMPNT: CPT

## 2018-02-17 PROCEDURE — 65270000029 HC RM PRIVATE

## 2018-02-17 PROCEDURE — 36415 COLL VENOUS BLD VENIPUNCTURE: CPT | Performed by: FAMILY MEDICINE

## 2018-02-17 RX ORDER — DIPHENHYDRAMINE HYDROCHLORIDE 50 MG/ML
25 INJECTION, SOLUTION INTRAMUSCULAR; INTRAVENOUS AS NEEDED
Status: DISCONTINUED | OUTPATIENT
Start: 2018-02-17 | End: 2018-02-18 | Stop reason: HOSPADM

## 2018-02-17 RX ORDER — ACETAMINOPHEN 325 MG/1
650 TABLET ORAL AS NEEDED
Status: DISCONTINUED | OUTPATIENT
Start: 2018-02-17 | End: 2018-02-18 | Stop reason: HOSPADM

## 2018-02-17 RX ORDER — SODIUM CHLORIDE 9 MG/ML
250 INJECTION, SOLUTION INTRAVENOUS AS NEEDED
Status: DISCONTINUED | OUTPATIENT
Start: 2018-02-17 | End: 2018-02-18 | Stop reason: HOSPADM

## 2018-02-17 RX ADMIN — ACETAMINOPHEN 650 MG: 325 TABLET, FILM COATED ORAL at 10:32

## 2018-02-17 RX ADMIN — AMLODIPINE BESYLATE 10 MG: 10 TABLET ORAL at 10:32

## 2018-02-17 RX ADMIN — ACETAMINOPHEN 650 MG: 325 TABLET, FILM COATED ORAL at 17:05

## 2018-02-17 RX ADMIN — TAMSULOSIN HYDROCHLORIDE 0.4 MG: 0.4 CAPSULE ORAL at 10:32

## 2018-02-17 RX ADMIN — PANTOPRAZOLE SODIUM 40 MG: 40 TABLET, DELAYED RELEASE ORAL at 17:05

## 2018-02-17 RX ADMIN — PANTOPRAZOLE SODIUM 40 MG: 40 TABLET, DELAYED RELEASE ORAL at 10:30

## 2018-02-17 RX ADMIN — SUCRALFATE 1 G: 1 SUSPENSION ORAL at 17:05

## 2018-02-17 RX ADMIN — SUCRALFATE 1 G: 1 SUSPENSION ORAL at 13:29

## 2018-02-17 RX ADMIN — SUCRALFATE 1 G: 1 SUSPENSION ORAL at 10:33

## 2018-02-17 RX ADMIN — SUCRALFATE 1 G: 1 SUSPENSION ORAL at 21:30

## 2018-02-17 NOTE — ROUTINE PROCESS
Bedside and Verbal shift change report given to 113Samir Kovacs (oncoming nurse) by Trinity Kelley RN (offgoing nurse). Report included the following information SBAR, Kardex and MAR.

## 2018-02-17 NOTE — PROGRESS NOTES
Received in bed awake and alert. In no acute distress. Vital signs stable. Family at bedside. Denies discomfort.

## 2018-02-17 NOTE — PROGRESS NOTES
[facility], [address] [place] [address]  [email or web address]         Impression/Plan:  1. Acute GI blood loss anemia , from esophagus with ulcer, stable but low for age. 2. Esophageal ulcer and reflux. Plan:  1. Cont Pantoprazole and Carafate as suggested yesterday. 2. Transfuse 1 unit of pRBC's to obtaing Hgb > 7.8 because of his advanced age. 3. Recheck h/h this afternoon and if stable w/o evidence of ongoing bleed, can be d/c'd home. 4. F/u with PCP      Chief Complaint:Esophageal ulcer. Subjective:  Pt feels better today. Hgb drifted down to 7.4 but he denies melena or hematemisis. EGD findings noted.         Eyes: conjunctiva normal, EOM normal   Neck: ROM normal, supple and trachea normal   Cardiovascular: heart normal, intact distal pulses, normal rate and regular rhythm   Pulmonary/Chest Wall: breath sounds normal and effort normal   Abdominal: appearance normal, bowel sounds normal and soft, non-acute, non-tender                Visit Vitals    /53 (BP 1 Location: Left arm, BP Patient Position: Supine)    Pulse 65    Temp 98.3 °F (36.8 °C)    Resp 16    Ht 5' 11\" (1.803 m)    Wt 75.3 kg (166 lb)    SpO2 100%    BMI 23.15 kg/m2           Intake/Output Summary (Last 24 hours) at 02/17/18 0921  Last data filed at 02/17/18 0800   Gross per 24 hour   Intake             1180 ml   Output             1725 ml   Net             -545 ml       CBC w/Diff    Lab Results   Component Value Date/Time    WBC 8.5 02/15/2018 07:00 PM    RBC 2.94 (L) 02/15/2018 07:00 PM    HGB 7.3 (L) 02/17/2018 01:20 AM    HCT 22.4 (L) 02/17/2018 01:20 AM    MCV 90.8 02/15/2018 07:00 PM    MCH 30.3 02/15/2018 07:00 PM    MCHC 33.3 02/15/2018 07:00 PM    RDW 12.8 02/15/2018 07:00 PM     02/15/2018 07:00 PM    Lab Results   Component Value Date/Time    GRANS 66 02/15/2018 07:00 PM    LYMPH 22 02/15/2018 07:00 PM    EOS 4 02/15/2018 07:00 PM    BASOS 0 02/15/2018 07:00 PM Basic Metabolic Profile   Recent Labs      02/15/18   1900   NA  140   K  4.1   CL  104   CO2  28   BUN  35*   CA  8.3*        Hepatic Function    Lab Results   Component Value Date/Time    ALB 2.9 (L) 02/15/2018 07:00 PM    TP 5.8 (L) 02/15/2018 07:00 PM    AP 61 02/15/2018 07:00 PM    Lab Results   Component Value Date/Time    SGOT 12 (L) 02/15/2018 07:00 PM        @[email or web address](CULT:3)                 )Lorraine Baires MD, MD  [facility] 1947, [address] [facility]  Pager [phone]  [email or web address]

## 2018-02-17 NOTE — PROGRESS NOTES
Progress Note    Patient: Delfina Tolentino MRN: [id number]  CSN: [id number]    YOB: 1934  Age: 80 y.o. Sex: male    DOA: 2/15/2018 LOS:  LOS: 2 days                    Subjective:       Delfina Tolentino is a 80 y.o. male who began having hematemesis yesterday evening with abdominal pain. SEen in ER with noted coffeeground emesis and drop in HCT. Admit for acute upper GI bleed with anemia. Pt receiving blood transfusion now no sx '  Had EGD this morning will chek result     Chief Complaint:   Chief Complaint   Patient presents with    Flank Pain    Hemoptysis       Review of systems  General: No fevers or chills. Cardiovascular: No chest pain or pressure. No palpitations. Pulmonary: No shortness of breath, cough or wheeze. Gastrointestinal: No abdominal pain, nausea, vomiting or diarrhea. Genitourinary: No urinary frequency, urgency, hesitancy or dysuria. Musculoskeletal: No joint or muscle pain, no back pain, no recent trauma. Neurologic: No headache, numbness, tingling or weakness. Objective:     Physical Exam:  Visit Vitals    /56 (BP 1 Location: Left arm, BP Patient Position: At rest)    Pulse 63    Temp 98.4 °F (36.9 °C)    Resp 18    Ht 5' 11\" (1.803 m)    Wt 75.3 kg (166 lb)    SpO2 100%    BMI 23.15 kg/m2        General:         Alert, cooperative, no acute distress    HEENT: NC, Atraumatic. PERRLA, anicteric sclerae. Lungs: CTA Bilaterally. No Wheezing/Rhonchi/Rales. Heart:  Regular  rhythm,  No murmur, No Rubs, No Gallops  Abdomen: Soft, Non distended, Non tender.  +Bowel sounds, no HSM  Extremities: No lower limb edema  Psych:    Not anxious or agitated. Neurologic:  CN 2-12 grossly intact, Alert and oriented X 3.   No acute neurological                                 Deficits,     Intake and Output:  Current Shift:  02/17 0701 - 02/17 1900  In: 780 [P.O.:780]  Out: 925 [Urine:925]  Last three shifts:  02/15 1901 - 02/17 0700  In: 1651.7 [P.O.:600; I.V.:1051.7]  Out: 1200 [Urine:1200]    Labs: Results:       Chemistry Recent Labs      02/15/18   1900   GLU  130*   NA  140   K  4.1   CL  104   CO2  28   BUN  35*   CREA  1.45*   CA  8.3*   AGAP  8   BUCR  24*   AP  61   TP  5.8*   ALB  2.9*   GLOB  2.9   AGRAT  1.0      CBC w/Diff Recent Labs      02/17/18   0120  02/16/18   1449  02/15/18   1900   WBC   --    --   8.5   RBC   --    --   2.94*   HGB  7.3*  7.8*  8.9*   HCT  22.4*  22.7*  26.7*   PLT   --    --   229   GRANS   --    --   66   LYMPH   --    --   22   EOS   --    --   4      Cardiac Enzymes No results for input(s): CPK, CKND1, MARIA ISABEL in the last 72 hours. No lab exists for component: CKRMB, TROIP   Coagulation Recent Labs      02/15/18   1900   PTP  14.8   INR  1.2   APTT  24.0       Lipid Panel No results found for: CHOL, CHOLPOCT, CHOLX, CHLST, CHOLV, 967995, HDL, LDL, LDLC, DLDLP, 470262, VLDLC, VLDL, TGLX, TRIGL, TRIGP, TGLPOCT, CHHD, CHHDX   BNP No results for input(s): BNPP in the last 72 hours.    Liver Enzymes Recent Labs      02/15/18   1900   TP  5.8*   ALB  2.9*   AP  61   SGOT  12*      Thyroid Studies No results found for: T4, T3U, TSH, TSHEXT       Procedures/imaging: see electronic medical records for all procedures/Xrays and details which were not copied into this note but were reviewed prior to creation of Plan    Medications:   Current Facility-Administered Medications   Medication Dose Route Frequency    0.9% sodium chloride infusion 250 mL  250 mL IntraVENous PRN    acetaminophen (TYLENOL) tablet 650 mg  650 mg Oral PRN    diphenhydrAMINE (BENADRYL) injection 25 mg  25 mg IntraVENous PRN    ondansetron (ZOFRAN) injection 4 mg  4 mg IntraVENous Q6H PRN    pantoprazole (PROTONIX) tablet 40 mg  40 mg Oral ACB&D    sucralfate (CARAFATE) 100 mg/mL oral suspension 1 g  1 g Oral AC&HS    amLODIPine (NORVASC) tablet 10 mg  10 mg Oral DAILY    tamsulosin (FLOMAX) capsule 0.4 mg  0.4 mg Oral DAILY    0.9% sodium chloride infusion 50 mL/hr IntraVENous CONTINUOUS       Assessment/Plan     Principal Problem:    Esophageal ulcer (2/17/2018)    Active Problems:    HTN (hypertension) (6/10/2015)      Syncope (2/15/2018)      GI bleed (2/15/2018)      Anemia (2/15/2018)    plan  Check H&H after transfusion   And in AM  Continue Protonix and carfate   D/C IV fluid   Cbc, cmp, mag in AM      Keke Santiago MD  2/17/2018 3:45 PM

## 2018-02-18 VITALS
DIASTOLIC BLOOD PRESSURE: 56 MMHG | HEART RATE: 63 BPM | WEIGHT: 163.58 LBS | TEMPERATURE: 97.8 F | HEIGHT: 71 IN | SYSTOLIC BLOOD PRESSURE: 136 MMHG | BODY MASS INDEX: 22.9 KG/M2 | RESPIRATION RATE: 20 BRPM | OXYGEN SATURATION: 100 %

## 2018-02-18 LAB
ALBUMIN SERPL-MCNC: 2.7 G/DL (ref 3.4–5)
ALBUMIN/GLOB SERPL: 0.8 {RATIO} (ref 0.8–1.7)
ALP SERPL-CCNC: 54 U/L (ref 45–117)
ALT SERPL-CCNC: 11 U/L (ref 16–61)
ANION GAP SERPL CALC-SCNC: 6 MMOL/L (ref 3–18)
AST SERPL-CCNC: 13 U/L (ref 15–37)
BASOPHILS # BLD: 0 K/UL (ref 0–0.1)
BASOPHILS NFR BLD: 0 % (ref 0–2)
BILIRUB SERPL-MCNC: 0.3 MG/DL (ref 0.2–1)
BUN SERPL-MCNC: 17 MG/DL (ref 7–18)
BUN/CREAT SERPL: 15 (ref 12–20)
CALCIUM SERPL-MCNC: 8.1 MG/DL (ref 8.5–10.1)
CHLORIDE SERPL-SCNC: 110 MMOL/L (ref 100–108)
CO2 SERPL-SCNC: 25 MMOL/L (ref 21–32)
CREAT SERPL-MCNC: 1.15 MG/DL (ref 0.6–1.3)
DIFFERENTIAL METHOD BLD: ABNORMAL
EOSINOPHIL # BLD: 0.4 K/UL (ref 0–0.4)
EOSINOPHIL NFR BLD: 6 % (ref 0–5)
ERYTHROCYTE [DISTWIDTH] IN BLOOD BY AUTOMATED COUNT: 13 % (ref 11.6–14.5)
GLOBULIN SER CALC-MCNC: 3.2 G/DL (ref 2–4)
GLUCOSE SERPL-MCNC: 97 MG/DL (ref 74–99)
HCT VFR BLD AUTO: 27.5 % (ref 36–48)
HGB BLD-MCNC: 9.1 G/DL (ref 13–16)
LYMPHOCYTES # BLD: 1.4 K/UL (ref 0.9–3.6)
LYMPHOCYTES NFR BLD: 18 % (ref 21–52)
MAGNESIUM SERPL-MCNC: 2.1 MG/DL (ref 1.6–2.6)
MCH RBC QN AUTO: 30.2 PG (ref 24–34)
MCHC RBC AUTO-ENTMCNC: 33.1 G/DL (ref 31–37)
MCV RBC AUTO: 91.4 FL (ref 74–97)
MONOCYTES # BLD: 0.7 K/UL (ref 0.05–1.2)
MONOCYTES NFR BLD: 9 % (ref 3–10)
NEUTS SEG # BLD: 5.2 K/UL (ref 1.8–8)
NEUTS SEG NFR BLD: 67 % (ref 40–73)
PLATELET # BLD AUTO: 201 K/UL (ref 135–420)
PMV BLD AUTO: 10.8 FL (ref 9.2–11.8)
POTASSIUM SERPL-SCNC: 4 MMOL/L (ref 3.5–5.5)
PROT SERPL-MCNC: 5.9 G/DL (ref 6.4–8.2)
RBC # BLD AUTO: 3.01 M/UL (ref 4.7–5.5)
SODIUM SERPL-SCNC: 141 MMOL/L (ref 136–145)
WBC # BLD AUTO: 7.7 K/UL (ref 4.6–13.2)

## 2018-02-18 PROCEDURE — 74011250637 HC RX REV CODE- 250/637: Performed by: FAMILY MEDICINE

## 2018-02-18 PROCEDURE — 83735 ASSAY OF MAGNESIUM: CPT | Performed by: FAMILY MEDICINE

## 2018-02-18 PROCEDURE — 85025 COMPLETE CBC W/AUTO DIFF WBC: CPT | Performed by: FAMILY MEDICINE

## 2018-02-18 PROCEDURE — 36415 COLL VENOUS BLD VENIPUNCTURE: CPT | Performed by: FAMILY MEDICINE

## 2018-02-18 PROCEDURE — 74011250637 HC RX REV CODE- 250/637: Performed by: INTERNAL MEDICINE

## 2018-02-18 PROCEDURE — 80053 COMPREHEN METABOLIC PANEL: CPT | Performed by: FAMILY MEDICINE

## 2018-02-18 RX ORDER — ACETAMINOPHEN 325 MG/1
650 TABLET ORAL AS NEEDED
Qty: 50 TAB | Refills: 0 | Status: SHIPPED | OUTPATIENT
Start: 2018-02-18 | End: 2018-09-06

## 2018-02-18 RX ORDER — SUCRALFATE 1 G/10ML
1 SUSPENSION ORAL
Qty: 414 ML | Refills: 0 | Status: SHIPPED | OUTPATIENT
Start: 2018-02-18 | End: 2019-01-17

## 2018-02-18 RX ORDER — PANTOPRAZOLE SODIUM 40 MG/1
40 TABLET, DELAYED RELEASE ORAL
Qty: 60 TAB | Refills: 0 | Status: SHIPPED | OUTPATIENT
Start: 2018-02-18 | End: 2019-01-17

## 2018-02-18 RX ADMIN — PANTOPRAZOLE SODIUM 40 MG: 40 TABLET, DELAYED RELEASE ORAL at 09:21

## 2018-02-18 RX ADMIN — AMLODIPINE BESYLATE 10 MG: 10 TABLET ORAL at 09:21

## 2018-02-18 RX ADMIN — TAMSULOSIN HYDROCHLORIDE 0.4 MG: 0.4 CAPSULE ORAL at 09:21

## 2018-02-18 RX ADMIN — SUCRALFATE 1 G: 1 SUSPENSION ORAL at 09:20

## 2018-02-18 NOTE — ROUTINE PROCESS
Bedside and Verbal shift change report given to [facility] RN (oncoming nurse) by Keke Lainez RN (offgoing nurse). Report included the following information SBAR, Kardex and MAR.

## 2018-02-18 NOTE — PROGRESS NOTES
[facility], [address] [place] [address]  [email or web address]         Impression/Plan:  1. Acute GI blood loss anemia - stable  2. Esophageal ulcer stable  Plan:  1. OK to d/c from GI standpoint with f/u by PCP. 2. PPI and Carafate as outlined in Dr Roche's note Friday. Chief Complaint: UGI bleed. Subjective:  Pt feels well. Had another unit of pRBC's to get his Hgb > 8. Pt w/o complaint at this time.         Eyes: conjunctiva normal, EOM normal   Neck: ROM normal, supple and trachea normal   Cardiovascular: heart normal, intact distal pulses, normal rate and regular rhythm   Pulmonary/Chest Wall: breath sounds normal and effort normal   Abdominal: appearance normal, bowel sounds normal and soft, non-acute, non-tender                Visit Vitals    /56    Pulse 63    Temp 97.8 °F (36.6 °C)    Resp 20    Ht 5' 11\" (1.803 m)    Wt 74.2 kg (163 lb 9.3 oz)    SpO2 100%    BMI 22.81 kg/m2           Intake/Output Summary (Last 24 hours) at 02/18/18 0950  Last data filed at 02/18/18 0400   Gross per 24 hour   Intake            866.7 ml   Output              825 ml   Net             41.7 ml       CBC w/Diff    Lab Results   Component Value Date/Time    WBC 7.7 02/18/2018 03:53 AM    RBC 3.01 (L) 02/18/2018 03:53 AM    HGB 9.1 (L) 02/18/2018 03:53 AM    HCT 27.5 (L) 02/18/2018 03:53 AM    MCV 91.4 02/18/2018 03:53 AM    MCH 30.2 02/18/2018 03:53 AM    MCHC 33.1 02/18/2018 03:53 AM    RDW 13.0 02/18/2018 03:53 AM     02/18/2018 03:53 AM    Lab Results   Component Value Date/Time    GRANS 67 02/18/2018 03:53 AM    LYMPH 18 (L) 02/18/2018 03:53 AM    EOS 6 (H) 02/18/2018 03:53 AM    BASOS 0 02/18/2018 03:53 AM      Basic Metabolic Profile   Recent Labs      02/18/18   0353   NA  141   K  4.0   CL  110*   CO2  25   BUN  17   CA  8.1*   MG  2.1        Hepatic Function    Lab Results   Component Value Date/Time    ALB 2.7 (L) 02/18/2018 03:53 AM    TP 5.9 (L) 02/18/2018 03:53 AM    AP 54 02/18/2018 03:53 AM    Lab Results   Component Value Date/Time    SGOT 13 (L) 02/18/2018 03:53 AM        @[email or web address](CULT:3)                 )Jose Gao MD, MD  [facility], [address] [place] [address]  Pager [phone]  [email or web address]

## 2018-02-18 NOTE — PROGRESS NOTES
CASTILLO NOTE:     Pt is being discharged today home with [facility]. Pt's daughter will provide discharge transport. CASTILLO has left a list of [facility] agencies for the granddaughter to select. CASTILLO will get the [facility] signed once the decision is made. Care Management Interventions  PCP Verified by CM: Yes (COLLIN Dinh MD last seen about 1 month ago)  Mode of Transport at Discharge: BLS  Transition of Care Consult (CM Consult): Home Health  Physical Therapy Consult: No  Occupational Therapy Consult: No  Speech Therapy Consult: No  Current Support Network: Lives Alone (Pt lives alone)  Confirm Follow Up Transport: Family (Pt's granddaughter to provide d/c transportation)  Discharge Location  Discharge Placement: Home with home health    SW will monitor and be available for d/c planning. THOM Boswell LSW   [phone] (pager)    1:35 - CASTILLO met with the pt and his granddaughter. They requested a referral be sent to [facility] [phone].

## 2018-02-18 NOTE — DISCHARGE SUMMARY
Discharge Summary     Patient: Sabrina Barbour MRN: [id number]  SSN: [id number]    YOB: 1934  Age: 80 y.o. Sex: male       Admit Date: 2/15/2018    Discharge Date: 2/18/2018      Admission Diagnoses: Syncope  GI bleed  GI bleed  DX    Discharge Diagnoses:   Problem List as of 2/18/2018  Date Reviewed: 2/16/2018          Codes Class Noted - Resolved    * (Principal)Esophageal ulcer ICD-10-CM: K22.10  ICD-9-CM: 530.20  2/17/2018 - Present        Syncope ICD-10-CM: R55  ICD-9-CM: 780.2  2/15/2018 - Present        GI bleed ICD-10-CM: K92.2  ICD-9-CM: 578.9  2/15/2018 - Present        Anemia ICD-10-CM: D64.9  ICD-9-CM: 285.9  2/15/2018 - Present        Synovial cyst, RT L4-5 ICD-10-CM: M71.30  ICD-9-CM: 727.40  10/12/2017 - Present        Spinal stenosis of lumbar region (Chronic) ICD-10-CM: M48.061  ICD-9-CM: 724.02  12/8/2016 - Present        Hip arthritis ICD-10-CM: M16.10  ICD-9-CM: 716.95  2/2/2016 - Present        Left hip pain ICD-10-CM: M25.552  ICD-9-CM: 719.45  Unknown - Present        Wears glasses ICD-10-CM: Z97.3  ICD-9-CM: V49.89  Unknown - Present        Retention of urine ICD-10-CM: R33.9  ICD-9-CM: 788.20  6/25/2015 - Present        HTN (hypertension) (Chronic) ICD-10-CM: I10  ICD-9-CM: 401.9  6/10/2015 - Present        Osteoarthrosis, unspecified whether generalized or localized, pelvic region and thigh ICD-10-CM: M16.9  ICD-9-CM: 715.95  6/9/2015 - Present               Discharge Condition: Stable    Hospital Course:   Gloria Llanos a 80 y. o. male who began having hematemesis before admission  with abdominal pain. He was sent to   ER and  noted coffeeground emesis and drop in HCT. Admit for acute upper GI bleed with anemia. Pt had one unite Of pack red blood cell yesterday    EGD report   1. Acute GI blood loss anemia - stable  2. Esophageal ulcer stable  Plan:  1. OK to d/c from GI standpoint with f/u by PCP.   2. PPI and Carafate as outlined in Dr Roche's note Friday.       Discussed with Dr Vaughn Aguilera and nursing staff  Reviewed stress echo done in [facility] was negative    Consults: Gastroenterology        Physical Examination:   General:  Alert, cooperative, no acute distress    HEENT: NC, Atraumatic. PERRLA, anicteric sclerae. Lungs:   CTA Bilaterally. No Wheezing/Rhonchi/Rales. Heart:   Regular  rhythm,  No murmur, No Rubs, No Gallops  Abdomen:                Soft, Non distended, Non tender.  +Bowel sounds, no HSM  Extremities: No lower limb edema  Psych:Not anxious or agitated. Neurological CN 2-12 grossly intact, Alert and oriented X 3. No acute neurological                                 Deficits,     Disposition: home with home health    Discharge Medications:   Current Discharge Medication List      START taking these medications    Details   pantoprazole (PROTONIX) 40 mg tablet Take 1 Tab by mouth Before breakfast and dinner. Qty: 60 Tab, Refills: 0      sucralfate (CARAFATE) 100 mg/mL suspension Take 10 mL by mouth Before breakfast, lunch, dinner and at bedtime. Qty: 414 mL, Refills: 0      acetaminophen (TYLENOL) 325 mg tablet Take 2 Tabs by mouth as needed for Fever. Qty: 50 Tab, Refills: 0         CONTINUE these medications which have NOT CHANGED    Details   amLODIPine (NORVASC) 10 mg tablet Refills: 0      cyclobenzaprine (FLEXERIL) 10 mg tablet take 1 tablet by mouth three times a day if needed for muscle spasm  Qty: 60 Tab, Refills: 1      ferrous sulfate 325 mg (65 mg iron) tablet take 1 tablet by mouth once daily  Refills: 0    Associated Diagnoses: Retention of urine; Benign non-nodular prostatic hyperplasia with lower urinary tract symptoms      tamsulosin (FLOMAX) 0.4 mg capsule Take 1 Cap by mouth daily. Qty: 90 Cap, Refills: 3      gabapentin (NEURONTIN) 100 mg capsule Take  by mouth three (3) times daily.          STOP taking these medications       naproxen (NAPROSYN) 500 mg tablet Comments:   Reason for Stopping:         traMADol Tanika Cunha) 50 mg tablet Comments:   Reason for Stopping:         amLODIPine-benazepril (LOTREL) 10-20 mg per capsule Comments:   Reason for Stopping:         diclofenac (VOLTAREN) 1 % gel Comments:   Reason for Stopping:         methylPREDNISolone (MEDROL DOSEPACK) 4 mg tablet Comments:   Reason for Stopping:               Activity: Activity as tolerated  Diet: Cardiac Diet  Wound Care: None needed    Follow-up Appointments   Procedures    FOLLOW UP VISIT Appointment in: Other (Specify) F/u Dr Joyce Cvaazos tomorrow aftab, cmp, [facility] home health to monitor vitals medication management     F/u Dr Yovany Rincon, [facility], [facility] home health to monitor vitals medication management     Standing Status:   Standing     Number of Occurrences:   1     Order Specific Question:   Appointment in     Answer:    Other (Specify)     Time spent for discharge this pt more than 35 minutes  Signed By: Yasmin Schuler MD     February 18, 2018

## 2018-02-19 LAB
ABO + RH BLD: NORMAL
BLD PROD TYP BPU: NORMAL
BLD PROD TYP BPU: NORMAL
BLOOD GROUP ANTIBODIES SERPL: NORMAL
BPU ID: NORMAL
BPU ID: NORMAL
CALLED TO:,BCALL1: NORMAL
CROSSMATCH RESULT,%XM: NORMAL
CROSSMATCH RESULT,%XM: NORMAL
SPECIMEN EXP DATE BLD: NORMAL
STATUS OF UNIT,%ST: NORMAL
STATUS OF UNIT,%ST: NORMAL
UNIT DIVISION, %UDIV: 0
UNIT DIVISION, %UDIV: 0

## 2018-04-07 ENCOUNTER — HOSPITAL ENCOUNTER (INPATIENT)
Age: 83
LOS: 2 days | Discharge: HOME | DRG: 312 | End: 2018-04-09
Attending: EMERGENCY MEDICINE | Admitting: FAMILY MEDICINE
Payer: MEDICARE

## 2018-04-07 ENCOUNTER — APPOINTMENT (OUTPATIENT)
Dept: CT IMAGING | Age: 83
DRG: 312 | End: 2018-04-07
Attending: PHYSICIAN ASSISTANT
Payer: MEDICARE

## 2018-04-07 DIAGNOSIS — R55 NEAR SYNCOPE: Primary | ICD-10-CM

## 2018-04-07 LAB
ANION GAP SERPL CALC-SCNC: 7 MMOL/L (ref 3–18)
BASOPHILS # BLD: 0 K/UL (ref 0–0.1)
BASOPHILS NFR BLD: 0 % (ref 0–2)
BUN SERPL-MCNC: 32 MG/DL (ref 7–18)
BUN/CREAT SERPL: 22 (ref 12–20)
CALCIUM SERPL-MCNC: 8.9 MG/DL (ref 8.5–10.1)
CHLORIDE SERPL-SCNC: 106 MMOL/L (ref 100–108)
CK MB CFR SERPL CALC: 1.2 % (ref 0–4)
CK MB CFR SERPL CALC: 1.3 % (ref 0–4)
CK MB SERPL-MCNC: 3.3 NG/ML (ref 5–25)
CK MB SERPL-MCNC: 3.5 NG/ML (ref 5–25)
CK SERPL-CCNC: 252 U/L (ref 39–308)
CK SERPL-CCNC: 283 U/L (ref 39–308)
CO2 SERPL-SCNC: 26 MMOL/L (ref 21–32)
CREAT SERPL-MCNC: 1.43 MG/DL (ref 0.6–1.3)
DIFFERENTIAL METHOD BLD: ABNORMAL
EOSINOPHIL # BLD: 0.1 K/UL (ref 0–0.4)
EOSINOPHIL NFR BLD: 1 % (ref 0–5)
ERYTHROCYTE [DISTWIDTH] IN BLOOD BY AUTOMATED COUNT: 14.4 % (ref 11.6–14.5)
GLUCOSE SERPL-MCNC: 123 MG/DL (ref 74–99)
HCT VFR BLD AUTO: 32 % (ref 36–48)
HGB BLD-MCNC: 10.5 G/DL (ref 13–16)
LYMPHOCYTES # BLD: 1.1 K/UL (ref 0.9–3.6)
LYMPHOCYTES NFR BLD: 8 % (ref 21–52)
MAGNESIUM SERPL-MCNC: 2.1 MG/DL (ref 1.6–2.6)
MCH RBC QN AUTO: 30 PG (ref 24–34)
MCHC RBC AUTO-ENTMCNC: 32.8 G/DL (ref 31–37)
MCV RBC AUTO: 91.4 FL (ref 74–97)
MONOCYTES # BLD: 0.5 K/UL (ref 0.05–1.2)
MONOCYTES NFR BLD: 4 % (ref 3–10)
NEUTS SEG # BLD: 11.4 K/UL (ref 1.8–8)
NEUTS SEG NFR BLD: 87 % (ref 40–73)
PLATELET # BLD AUTO: 252 K/UL (ref 135–420)
PMV BLD AUTO: 11.1 FL (ref 9.2–11.8)
POTASSIUM SERPL-SCNC: 4.1 MMOL/L (ref 3.5–5.5)
RBC # BLD AUTO: 3.5 M/UL (ref 4.7–5.5)
SODIUM SERPL-SCNC: 139 MMOL/L (ref 136–145)
TROPONIN I SERPL-MCNC: <0.02 NG/ML (ref 0–0.04)
TROPONIN I SERPL-MCNC: <0.02 NG/ML (ref 0–0.04)
WBC # BLD AUTO: 13.1 K/UL (ref 4.6–13.2)

## 2018-04-07 PROCEDURE — 85025 COMPLETE CBC W/AUTO DIFF WBC: CPT | Performed by: PHYSICIAN ASSISTANT

## 2018-04-07 PROCEDURE — 83735 ASSAY OF MAGNESIUM: CPT | Performed by: PHYSICIAN ASSISTANT

## 2018-04-07 PROCEDURE — 65660000000 HC RM CCU STEPDOWN

## 2018-04-07 PROCEDURE — 82550 ASSAY OF CK (CPK): CPT | Performed by: PHYSICIAN ASSISTANT

## 2018-04-07 PROCEDURE — 74011250637 HC RX REV CODE- 250/637: Performed by: EMERGENCY MEDICINE

## 2018-04-07 PROCEDURE — 93005 ELECTROCARDIOGRAM TRACING: CPT

## 2018-04-07 PROCEDURE — 99285 EMERGENCY DEPT VISIT HI MDM: CPT

## 2018-04-07 PROCEDURE — 70450 CT HEAD/BRAIN W/O DYE: CPT

## 2018-04-07 PROCEDURE — 74011250637 HC RX REV CODE- 250/637: Performed by: FAMILY MEDICINE

## 2018-04-07 PROCEDURE — 80048 BASIC METABOLIC PNL TOTAL CA: CPT | Performed by: PHYSICIAN ASSISTANT

## 2018-04-07 RX ORDER — TAMSULOSIN HYDROCHLORIDE 0.4 MG/1
0.4 CAPSULE ORAL DAILY
Status: DISCONTINUED | OUTPATIENT
Start: 2018-04-08 | End: 2018-04-09 | Stop reason: HOSPADM

## 2018-04-07 RX ORDER — SUCRALFATE 1 G/10ML
1 SUSPENSION ORAL
Status: DISCONTINUED | OUTPATIENT
Start: 2018-04-08 | End: 2018-04-09 | Stop reason: HOSPADM

## 2018-04-07 RX ORDER — PANTOPRAZOLE SODIUM 40 MG/1
40 TABLET, DELAYED RELEASE ORAL
Status: DISCONTINUED | OUTPATIENT
Start: 2018-04-08 | End: 2018-04-09 | Stop reason: HOSPADM

## 2018-04-07 RX ORDER — AMLODIPINE BESYLATE 10 MG/1
10 TABLET ORAL DAILY
Status: DISCONTINUED | OUTPATIENT
Start: 2018-04-08 | End: 2018-04-09 | Stop reason: HOSPADM

## 2018-04-07 RX ORDER — ASPIRIN 325 MG
325 TABLET ORAL
Status: COMPLETED | OUTPATIENT
Start: 2018-04-07 | End: 2018-04-07

## 2018-04-07 RX ORDER — GABAPENTIN 100 MG/1
100 CAPSULE ORAL 3 TIMES DAILY
Status: DISCONTINUED | OUTPATIENT
Start: 2018-04-07 | End: 2018-04-09 | Stop reason: HOSPADM

## 2018-04-07 RX ADMIN — GABAPENTIN 100 MG: 100 CAPSULE ORAL at 22:33

## 2018-04-07 RX ADMIN — ASPIRIN 325 MG ORAL TABLET 325 MG: 325 PILL ORAL at 23:39

## 2018-04-07 RX ADMIN — PHENOBARBITAL 30 ML: 16.2; .1037; .0065; .0194 ELIXIR ORAL at 21:19

## 2018-04-07 NOTE — ED PROVIDER NOTES
EMERGENCY DEPARTMENT HISTORY AND PHYSICAL EXAM    7:08 PM      Date: 4/7/2018  Patient Name: Radha Sadler    History of Presenting Illness     Chief Complaint   Patient presents with    Dizziness     History Provided By: Patient    Chief Complaint: Throat burning  Duration: 5 Hours  Timing:  Acute  Location: Throat  Quality: Burning  Severity: N/A  Modifying Factors: No relieving or worsening factors   Associated Symptoms: denies any other associated signs or symptoms      Additional History (Context): Radha Sadler is a 80 y.o. male with arthritis, thromboembolus, and HTN who presents with acute throat burning onset 5 hours ago. Pt states he was at work ~5 hours ago when he experienced an episode of throat tightness, dizziness, blurred vision, difficulty breathing, trouble swallowing, and diaphoresis after mopping the floor, which have since resolved. Associated symptoms include slight SOB. Pt states that the rescue squad came but he refused to ride with them and had a family member drive him here. Pt states he had a similar incident in February with hematemesis, was seen here and admitted, and was DC and dx'ed with syncope. Pt denies HA, any fall/injury, hx of MI, CVA, DM. No modifying or aggravating factors were reported. No other symptoms or concerns were expressed.      PCP: Jacque Romero MD    Current Facility-Administered Medications   Medication Dose Route Frequency Provider Last Rate Last Dose    [START ON 4/8/2018] tamsulosin (FLOMAX) capsule 0.4 mg  0.4 mg Oral DAILY Jacque Romero MD        [START ON 4/8/2018] sucralfate (CARAFATE) 100 mg/mL oral suspension 1 g  1 g Oral AC&HS Jacque Romero MD        [START ON 4/8/2018] pantoprazole (PROTONIX) tablet 40 mg  40 mg Oral ACB&D Jacque Romero MD        [START ON 4/8/2018] amLODIPine (NORVASC) tablet 10 mg  10 mg Oral DAILY Jacque Romero MD        gabapentin (NEURONTIN) capsule 100 mg  100 mg Oral TID Jacque Romero MD   100 mg at 04/07/18 2233     Current Outpatient Prescriptions   Medication Sig Dispense Refill    pantoprazole (PROTONIX) 40 mg tablet Take 1 Tab by mouth Before breakfast and dinner. 60 Tab 0    sucralfate (CARAFATE) 100 mg/mL suspension Take 10 mL by mouth Before breakfast, lunch, dinner and at bedtime. 414 mL 0    acetaminophen (TYLENOL) 325 mg tablet Take 2 Tabs by mouth as needed for Fever. 50 Tab 0    amLODIPine (NORVASC) 10 mg tablet   0    cyclobenzaprine (FLEXERIL) 10 mg tablet take 1 tablet by mouth three times a day if needed for muscle spasm 60 Tab 1    ferrous sulfate 325 mg (65 mg iron) tablet take 1 tablet by mouth once daily  0    tamsulosin (FLOMAX) 0.4 mg capsule Take 1 Cap by mouth daily. 90 Cap 3    gabapentin (NEURONTIN) 100 mg capsule Take  by mouth three (3) times daily. Past History     Past Medical History:  Past Medical History:   Diagnosis Date    Arthritis     Chronic pain     left hip and lower back    Hypertension     Left hip pain     resolved since surgery    Thromboembolus ([address] 1975    [facility], [address] [place] [address] Wears glasses        Past Surgical History:  Past Surgical History:   Procedure Laterality Date    FOOT/TOES SURGERY PROC UNLISTED      HX HIP REPLACEMENT      HX OTHER SURGICAL  1975    brain surgery, Dr. Cedric Lincoln Left 6/2015       Family History:  Family History   Problem Relation Age of Onset    Family history unknown: Yes       Social History:  Social History   Substance Use Topics    Smoking status: Former Smoker     Packs/day: 1.00     Years: 25.00     Quit date: 6/1/1990    Smokeless tobacco: Never Used    Alcohol use 0.5 oz/week     1 Shots of liquor per week       Allergies:  No Known Allergies      Review of Systems     Review of Systems   Constitutional: Negative for chills and fever. HENT: Positive for sore throat (burning). Respiratory: Negative for shortness of breath. Cardiovascular: Negative for chest pain. Gastrointestinal: Negative for diarrhea, nausea and vomiting. All other systems reviewed and are negative. Physical Exam     Visit Vitals    /58    Pulse 73    Temp 96.9 °F (36.1 °C)    Resp 15    SpO2 100%       Physical Exam   Constitutional: He is oriented to person, place, and time. He appears well-developed and well-nourished. No distress. HENT:   Head: Normocephalic and atraumatic. Eyes: Conjunctivae and EOM are normal. Right eye exhibits no discharge. Left eye exhibits no discharge. No scleral icterus. Neck: Normal range of motion. Neck supple. No tracheal deviation present. Cardiovascular: Normal rate, regular rhythm and normal heart sounds. No murmur heard. Pulmonary/Chest: Effort normal and breath sounds normal. No respiratory distress. He has no wheezes. He has no rales. Abdominal: Soft. He exhibits no distension. There is no tenderness. There is no rebound and no guarding. Musculoskeletal: Normal range of motion. He exhibits no edema or deformity. Neurological: He is alert and oriented to person, place, and time. He has normal strength. No cranial nerve deficit or sensory deficit. He displays a negative Romberg sign. Coordination normal. GCS eye subscore is 4. GCS verbal subscore is 5. GCS motor subscore is 6. Nml strength. Skin: Skin is warm and dry. He is not diaphoretic. Psychiatric: He has a normal mood and affect. His behavior is normal. Judgment and thought content normal.   Nursing note and vitals reviewed.         Diagnostic Study Results     Labs -  Recent Results (from the past 12 hour(s))   EKG, 12 LEAD, INITIAL    Collection Time: 04/07/18  4:21 PM   Result Value Ref Range    Ventricular Rate 83 BPM    Atrial Rate 83 BPM    P-R Interval 144 ms    QRS Duration 80 ms    Q-T Interval 370 ms    QTC Calculation (Bezet) 434 ms    Calculated P Axis 74 degrees    Calculated R Axis 2 degrees    Calculated T Axis 27 degrees    Diagnosis       Normal sinus rhythm  Cannot rule out Anterior infarct , age undetermined  Abnormal ECG  When compared with ECG of 25-JAN-2016 11:33,  premature atrial complexes are no longer present     CBC WITH AUTOMATED DIFF    Collection Time: 04/07/18  5:55 PM   Result Value Ref Range    WBC 13.1 4.6 - 13.2 K/uL    RBC 3.50 (L) 4.70 - 5.50 M/uL    HGB 10.5 (L) 13.0 - 16.0 g/dL    HCT 32.0 (L) 36.0 - 48.0 %    MCV 91.4 74.0 - 97.0 FL    MCH 30.0 24.0 - 34.0 PG    MCHC 32.8 31.0 - 37.0 g/dL    RDW 14.4 11.6 - 14.5 %    PLATELET 137 353 - 638 K/uL    MPV 11.1 9.2 - 11.8 FL    NEUTROPHILS 87 (H) 40 - 73 %    LYMPHOCYTES 8 (L) 21 - 52 %    MONOCYTES 4 3 - 10 %    EOSINOPHILS 1 0 - 5 %    BASOPHILS 0 0 - 2 %    ABS. NEUTROPHILS 11.4 (H) 1.8 - 8.0 K/UL    ABS. LYMPHOCYTES 1.1 0.9 - 3.6 K/UL    ABS. MONOCYTES 0.5 0.05 - 1.2 K/UL    ABS. EOSINOPHILS 0.1 0.0 - 0.4 K/UL    ABS.  BASOPHILS 0.0 0.0 - 0.1 K/UL    DF AUTOMATED     METABOLIC PANEL, BASIC    Collection Time: 04/07/18  5:55 PM   Result Value Ref Range    Sodium 139 136 - 145 mmol/L    Potassium 4.1 3.5 - 5.5 mmol/L    Chloride 106 100 - 108 mmol/L    CO2 26 21 - 32 mmol/L    Anion gap 7 3.0 - 18 mmol/L    Glucose 123 (H) 74 - 99 mg/dL    BUN 32 (H) 7.0 - 18 MG/DL    Creatinine 1.43 (H) 0.6 - 1.3 MG/DL    BUN/Creatinine ratio 22 (H) 12 - 20      GFR est AA 57 (L) >60 ml/min/1.73m2    GFR est non-AA 47 (L) >60 ml/min/1.73m2    Calcium 8.9 8.5 - 10.1 MG/DL   CARDIAC PANEL,(CK, CKMB & TROPONIN)    Collection Time: 04/07/18  5:55 PM   Result Value Ref Range     39 - 308 U/L    CK - MB 3.5 <3.6 ng/ml    CK-MB Index 1.2 0.0 - 4.0 %    Troponin-I, Qt. <0.02 0.0 - 0.045 NG/ML   MAGNESIUM    Collection Time: 04/07/18  5:55 PM   Result Value Ref Range    Magnesium 2.1 1.6 - 2.6 mg/dL   CARDIAC PANEL,(CK, CKMB & TROPONIN)    Collection Time: 04/07/18  9:44 PM   Result Value Ref Range     39 - 308 U/L    CK - MB 3.3 <3.6 ng/ml    CK-MB Index 1.3 0.0 - 4.0 %    Troponin-I, Qt. <0.02 0.0 - 0.045 NG/ML       Radiologic Studies -   CT HEAD WO CONT   Final Result   IMPRESSION:  1. Possible punctate right frontal intraparenchymal hemorrhage. Consider  correlation with follow-up CT in 6 to 12 hours or follow-up MRI with [facility]. As read by the radiologist.         Medical Decision Making   I am the first provider for this patient. I reviewed the vital signs, available nursing notes, past medical history, past surgical history, family history and social history. Vital Signs-Reviewed the patient's vital signs. Pulse Oximetry Analysis -  98% on room air     Cardiac Monitor:  Rate: 79 bpm  Rhythm:  Normal Sinus Rhythm     EKG: Interpreted by the EP. Time Interpreted: 7:21 PM   Rate: 83 bpm   Rhythm: Normal Sinus Rhythm    Interpretation: ST depressions in leads II and aVF. No STEMI. Records Reviewed: Nursing Notes and Old Medical Records (Time of Review: 7:08 PM)    ED Course: Progress Notes, Reevaluation, and Consults:  7:17 PM Consult: Discussed patient's history, exam, and available diagnostics results with Dr. Lorn Boas (Teleneurology), who agrees to evaluate patient. 7:25 PM Consult: Discussed patient's history, exam, and available diagnostics results with Dr. Lorn Boas (Teleneurology), who recommends admission and MRI follow up tomorrow. 8:07 PM Re-evaluation. Pt feeling better other than throat burning. Updated on consult with Dr. Lorn Boas (Teleneurology), all results, and need for admission due to abnormal EKG. Pt understands and agrees with plan. 10:08 PM Consult: Discussed patient's history, exam, and available diagnostics results with Janneth Rollins MD, hospitalist, who accepts admission. Provider Notes (Medical Decision Making): Pt with apparent near-syncopal episode. Now back to baseline. Given significant diaphoresis, age, and cardiac risk factors, pt will need to be admitted to rule out ACS.  Additionally, CT sowed possible punctate area of hemorrhage. This will need to be clarified by MRI as an inptatient. Patient will be admitted. For Hospitalized Patients:    1. Hospitalization Decision Time:  The decision to hospitalize the patient was made by Dr. Morelia Luther at 10:09 PM on 4/7/2018    2. Aspirin: Aspirin was given    Diagnosis     Clinical Impression:   1. Near syncope        Disposition: admit    Follow-up Information     None           Patient's Medications   Start Taking    No medications on file   Continue Taking    ACETAMINOPHEN (TYLENOL) 325 MG TABLET    Take 2 Tabs by mouth as needed for Fever. AMLODIPINE (NORVASC) 10 MG TABLET        CYCLOBENZAPRINE (FLEXERIL) 10 MG TABLET    take 1 tablet by mouth three times a day if needed for muscle spasm    FERROUS SULFATE 325 MG (65 MG IRON) TABLET    take 1 tablet by mouth once daily    GABAPENTIN (NEURONTIN) 100 MG CAPSULE    Take  by mouth three (3) times daily. PANTOPRAZOLE (PROTONIX) 40 MG TABLET    Take 1 Tab by mouth Before breakfast and dinner. SUCRALFATE (CARAFATE) 100 MG/ML SUSPENSION    Take 10 mL by mouth Before breakfast, lunch, dinner and at bedtime. TAMSULOSIN (FLOMAX) 0.4 MG CAPSULE    Take 1 Cap by mouth daily. These Medications have changed    No medications on file   Stop Taking    No medications on file     _______________________________    Attestations:  1 [facility] acting as a scribe for and in the presence of Lakshmi Ramirez MD      April 07, 2018 at 10:41 PM       Provider Attestation:      I personally performed the services described in the documentation, reviewed the documentation, as recorded by the scribe in my presence, and it accurately and completely records my words and actions.  April 07, 2018 at 10:41 PM - Lakshmi Ramirez MD    _______________________________

## 2018-04-07 NOTE — IP AVS SNAPSHOT
[address] [address] Sallyzander Fuentes Patient: Alejandra Goff MRN: [id number] :1934 A check caitlyn indicates which time of day the medication should be taken. My Medications CONTINUE taking these medications Instructions Each Dose to Equal  
 Morning Noon Evening Bedtime  
 acetaminophen 325 mg tablet Commonly known as:  TYLENOL Your last dose was: Your next dose is: Take 2 Tabs by mouth as needed for Fever. 650 mg  
    
   
   
   
  
 amLODIPine 10 mg tablet Commonly known as:  Almas Lute Your last dose was: Your next dose is:    
   
   
      
   
   
   
  
 cyclobenzaprine 10 mg tablet Commonly known as:  FLEXERIL Your last dose was: Your next dose is:    
   
   
 take 1 tablet by mouth three times a day if needed for muscle spasm  
     
   
   
   
  
 ferrous sulfate 325 mg (65 mg iron) tablet Your last dose was: Your next dose is:    
   
   
 take 1 tablet by mouth once daily  
     
   
   
   
  
 gabapentin 100 mg capsule Commonly known as:  NEURONTIN Your last dose was: Your next dose is: Take  by mouth three (3) times daily. pantoprazole 40 mg tablet Commonly known as:  PROTONIX Your last dose was: Your next dose is: Take 1 Tab by mouth Before breakfast and dinner. 40 mg  
    
   
   
   
  
 sucralfate 100 mg/mL suspension Commonly known as:  Samul Se Your last dose was: Your next dose is: Take 10 mL by mouth Before breakfast, lunch, dinner and at bedtime. 1 g  
    
   
   
   
  
 tamsulosin 0.4 mg capsule Commonly known as:  FLOMAX Your last dose was: Your next dose is: Take 1 Cap by mouth daily.   
 0.4 mg

## 2018-04-07 NOTE — IP AVS SNAPSHOT
[address] Patient: Yolanda Alcaraz MRN: [id number] :1934 About your hospitalization You were admitted on:  2018 You last received care in the:  [facility] [address] You were discharged on:  2018 Why you were hospitalized Your primary diagnosis was:  Not on File Your diagnoses also included:  Near Syncope, Htn (Hypertension) Follow-up Information Follow up With Details Comments Contact Info Jairo Gallagher MD Go on 2018 follow up @2:30pm [address] [facility] [address] 
[phone] Your Scheduled Appointments  11:30 AM EDT Follow Up with Dilma Barajas MD  
[facility] [address] [place] [address]) [address] [facility] [address]  
[phone] Discharge Orders None A check caitlyn indicates which time of day the medication should be taken. My Medications CONTINUE taking these medications Instructions Each Dose to Equal  
 Morning Noon Evening Bedtime  
 acetaminophen 325 mg tablet Commonly known as:  TYLENOL Your last dose was: Your next dose is: Take 2 Tabs by mouth as needed for Fever. 650 mg  
    
   
   
   
  
 amLODIPine 10 mg tablet Commonly known as:  Caralee Dupes Your last dose was: Your next dose is:    
   
   
      
   
   
   
  
 cyclobenzaprine 10 mg tablet Commonly known as:  FLEXERIL Your last dose was: Your next dose is:    
   
   
 take 1 tablet by mouth three times a day if needed for muscle spasm  
     
   
   
   
  
 ferrous sulfate 325 mg (65 mg iron) tablet Your last dose was: Your next dose is:    
   
   
 take 1 tablet by mouth once daily  
     
   
   
   
  
 gabapentin 100 mg capsule Commonly known as:  NEURONTIN Your last dose was: Your next dose is: Take  by mouth three (3) times daily. pantoprazole 40 mg tablet Commonly known as:  PROTONIX Your last dose was: Your next dose is: Take 1 Tab by mouth Before breakfast and dinner. 40 mg  
    
   
   
   
  
 sucralfate 100 mg/mL suspension Commonly known as:  Tamsen Lien Your last dose was: Your next dose is: Take 10 mL by mouth Before breakfast, lunch, dinner and at bedtime. 1 g  
    
   
   
   
  
 tamsulosin 0.4 mg capsule Commonly known as:  FLOMAX Your last dose was: Your next dose is: Take 1 Cap by mouth daily. 0.4 mg Discharge Instructions Patient armband removed and shredded MyChart Activation Thank you for requesting access to [employer]. Please follow the instructions below to securely access and download your online medical record. [employer] allows you to send messages to your doctor, view your test results, renew your prescriptions, schedule appointments, and more. How Do I Sign Up? 1. In your internet browser, go to [email or web address] 
2. Click on the First Time User? Click Here link in the Sign In box. You will be redirect to the New Member Sign Up page. 3. Enter your [employer] Access Code exactly as it appears below. You will not need to use this code after youve completed the sign-up process. If you do not sign up before the expiration date, you must request a new code. [employer] Access Code: [email or web address] Expires: 2018 11:42 AM (This is the date your [employer] access code will ) 4. Enter the last four digits of your Social Security Number (xxxx) and Date of Birth (mm/dd/yyyy) as indicated and click Submit. You will be taken to the next sign-up page. 5. Create a [employer] ID.  This will be your [employer] login ID and cannot be changed, so think of one that is secure and easy to remember. 6. Create a A[employer] password. You can change your password at any time. 7. Enter your Password Reset Question and Answer. This can be used at a later time if you forget your password. 8. Enter your e-mail address. You will receive e-mail notification when new information is available in [address] 9. Click Sign Up. You can now view and download portions of your medical record. 10. Click the Download Summary menu link to download a portable copy of your medical information. Additional Information If you have questions, please visit the Frequently Asked Questions section of the [employer] website at [email or web address] Remember, [employer] is NOT to be used for urgent needs. For medical emergencies, dial 911. DISCHARGE SUMMARY from Nurse PATIENT INSTRUCTIONS: 
 
 
F-face looks uneven A-arms unable to move or move unevenly S-speech slurred or non-existent T-time-call 911 as soon as signs and symptoms begin-DO NOT go Back to bed or wait to see if you get better-TIME IS BRAIN. Warning Signs of HEART ATTACK Call 911 if you have these symptoms: 
? Chest discomfort. Most heart attacks involve discomfort in the center of the chest that lasts more than a few minutes, or that goes away and comes back. It can feel like uncomfortable pressure, squeezing, fullness, or pain. ? Discomfort in other areas of the upper body. Symptoms can include pain or discomfort in one or both arms, the back, neck, jaw, or stomach. ? Shortness of breath with or without chest discomfort. ? Other signs may include breaking out in a cold sweat, nausea, or lightheadedness. Don't wait more than five minutes to call [address]! Fast action can save your life.  Calling 911 is almost always the fastest way to get lifesaving treatment. Emergency Medical Services staff can begin treatment when they arrive  up to an hour sooner than if someone gets to the hospital by car. The discharge information has been reviewed with the patient. The patient verbalized understanding. Discharge medications reviewed with the patient and appropriate educational materials and side effects teaching were provided. ____________________________________________________________________________________________________________________ inContactt Announcement We are excited to announce that we are making your provider's discharge notes available to you in BitTorrent. You will see these notes when they are completed and signed by the physician that discharged you from your recent hospital stay. If you have any questions or concerns about any information you see in BitTorrent, please call the Health Information Department where you were seen or reach out to your Primary Care Provider for more information about your plan of care. Introducing [facility] [place] [facility]! [employer] introduces BitTorrent patient portal. Now you can access parts of your medical record, email your doctor's office, and request medication refills online. 1. In your internet browser, go to [email or web address] 2. Click on the First Time User? Click Here link in the Sign In box. You will see the New Member Sign Up page. 3. Enter your BitTorrent Access Code exactly as it appears below. You will not need to use this code after youve completed the sign-up process. If you do not sign up before the expiration date, you must request a new code. · BitTorrent Access Code: [email or web address] Expires: 5/19/2018 11:42 AM 
 
4. Enter the last four digits of your Social Security Number (xxxx) and Date of Birth (mm/dd/yyyy) as indicated and click Submit. You will be taken to the next sign-up page. 5. Create a Jimmy Fairly ID. This will be your [employer] login ID and cannot be changed, so think of one that is secure and easy to remember. 6. Create a Jimmy Fairly password. You can change your password at any time. 7. Enter your Password Reset Question and Answer. This can be used at a later time if you forget your password. 8. Enter your e-mail address. You will receive e-mail notification when new information is available in [address] 9. Click Sign Up. You can now view and download portions of your medical record. 10. Click the Download Summary menu link to download a portable copy of your medical information. If you have questions, please visit the Frequently Asked Questions section of the Jimmy Fairly website. Remember, [employer] is NOT to be used for urgent needs. For medical emergencies, dial 911. Now available from your iPhone and Android! Introducing Isidro Stevenson As a [facility] patient, I wanted to make you aware of our electronic visit tool called Isidro Stevenson. [facility] 24/7 allows you to connect within minutes with a medical provider 24 hours a day, seven days a week via a mobile device or tablet or logging into a secure website from your computer. You can access Isidro Torresfin from anywhere in the [place]. A virtual visit might be right for you when you have a simple condition and feel like you just dont want to get out of bed, or cant get away from work for an appointment, when your regular [facility] provider is not available (evenings, weekends or holidays), or when youre out of town and need minor care. Electronic visits cost only $49 and if the [facility] 24/7 provider determines a prescription is needed to treat your condition, one can be electronically transmitted to a nearby pharmacy*. Please take a moment to enroll today if you have not already done so. The enrollment process is free and takes just a few minutes.   To enroll, please download the [employer] 24/7 isabel to your tablet or phone, or visit [email or web address] to enroll on your computer. And, as an [address] [place] [address] Street patient with a [employer] account, the results of your visits will be scanned into your electronic medical record and your primary care provider will be able to view the scanned results. We urge you to continue to see your regular [employer] [facility] provider for your ongoing medical care. And while your primary care provider may not be the one available when you seek a [facility] virtual visit, the peace of mind you get from getting a real diagnosis real time can be priceless. For more information on [facility], view our Frequently Asked Questions (FAQs) at [email or web address]. Sincerely, 
 
Annabelle Ferreira MD 
Chief Medical Officer Megan Quijano *:  certain medications cannot be prescribed via [facility] Unresulted Labs-Please follow up with your PCP about these lab tests Order Current Status EKG, 12 LEAD, INITIAL Preliminary result Providers Seen During Your Hospitalization Provider Specialty Primary office phone Pauly Anne MD Emergency Medicine [phone] Honey Pierce MD Family Practice [phone] Your Primary Care Physician (PCP) Primary Care Physician Office Phone Office Fax Skip Barnhart [phone] You are allergic to the following No active allergies Recent Documentation Weight BMI Smoking Status 74.8 kg 23.01 kg/m2 Former Smoker Emergency Contacts Name Discharge Info Relation Home Work Mobile Ferdinand Birch (Granddaughter) DISCHARGE CAREGIVER [3] Other Relative [6] [id number] [address] CAREGIVER [3] Other Relative [6] [phone] Patient Belongings The following personal items are in your possession at time of discharge: 
  Dental Appliances: None  Visual Aid: Glasses      Home Medications: None   Jewelry: None  Clothing: At bedside, [place], Footwear, Jacket/Coat, Socks, Pants, Undergarments, Shirt, With patient    Other Valuables: [employer], Eyeglasses (with ) Discharge Instructions Attachments/References HYPERTENSION: GENERAL INFO (ENGLISH) FAINTING: AFTER YOUR VISIT TO THE EMERGENCY ROOM (ENGLISH) Patient Handouts Learning About High Blood Pressure What is high blood pressure? Blood pressure is a measure of how hard the blood pushes against the walls of your arteries. It's normal for blood pressure to go up and down throughout the day, but if it stays up, you have high blood pressure. Another name for high blood pressure is hypertension. Two numbers tell you your blood pressure. The first number is the systolic pressure. It shows how hard the blood pushes when your heart is pumping. The second number is the diastolic pressure. It shows how hard the blood pushes between heartbeats, when your heart is relaxed and filling with blood. A blood pressure of less than 120/80 (say \"120 over 80\") is ideal for an adult. High blood pressure is 140/90 or higher. You have high blood pressure if your top number is 140 or higher or your bottom number is 90 or higher, or both. Many people fall into the category in between, called prehypertension. People with prehypertension need to make lifestyle changes to bring their blood pressure down and help prevent or delay high blood pressure. What happens when you have high blood pressure? · Blood flows through your arteries with too much force. Over time, this damages the walls of your arteries. But you can't feel it. High blood pressure usually doesn't cause symptoms. · Fat and calcium start to build up in your arteries.  This buildup is called plaque. Plaque makes your arteries narrower and stiffer. Blood can't flow through them as easily. · This lack of good blood flow starts to damage some of the organs in your body. This can lead to problems such as coronary artery disease and heart attack, heart failure, stroke, kidney failure, and eye damage. How can you prevent high blood pressure? · Stay at a healthy weight. · Try to limit how much sodium you eat to less than 2,300 milligrams (mg) a day. If you limit your sodium to 1,500 mg a day, you can lower your blood pressure even more. ¨ Buy foods that are labeled \"unsalted,\" \"sodium-free,\" or \"low-sodium. \" Foods labeled \"reduced-sodium\" and \"light sodium\" may still have too much sodium. ¨ Flavor your food with garlic, lemon juice, onion, vinegar, herbs, and spices instead of salt. Do not use soy sauce, steak sauce, onion salt, garlic salt, mustard, or ketchup on your food. ¨ Use less salt (or none) when recipes call for it. You can often use half the salt a recipe calls for without losing flavor. · Be physically active. Get at least 30 minutes of exercise on most days of the week. Walking is a good choice. You also may want to do other activities, such as running, swimming, cycling, or playing tennis or team sports. · Limit alcohol to 2 drinks a day for men and 1 drink a day for women. · Eat plenty of fruits, vegetables, and low-fat dairy products. Eat less saturated and total fats. How is high blood pressure treated? · Your doctor will suggest making lifestyle changes. For example, your doctor may ask you to eat healthy foods, quit smoking, lose extra weight, and be more active. · If lifestyle changes don't help enough or your blood pressure is very high, you will have to take medicine every day. Follow-up care is a key part of your treatment and safety.  Be sure to make and go to all appointments, and call your doctor if you are having problems. It's also a good idea to know your test results and keep a list of the medicines you take. Where can you learn more? Go to http://em-gus.info/. Enter P501 in the search box to learn more about \"Learning About High Blood Pressure. \" Current as of: September 21, 2016 Content Version: 11.4 © 8278-7020 Yozio. Care instructions adapted under license by [employer] (which disclaims liability or warranty for this information). If you have questions about a medical condition or this instruction, always ask your healthcare professional. [address] any warranty or liability for your use of this information. Fainting: After Your Visit to the Emergency Room Your Care Instructions You were seen in the emergency room because you fainted. This means you passed out, or lost consciousness. The treatment you need depends on the reason why you fainted. In many cases fainting is not serious. The doctor may have ordered tests to rule out more serious causes of fainting. Even though you have been released from the emergency room, you still need to watch for any problems. The doctor carefully checked you. But sometimes problems can develop later. If you have new symptoms, or if your symptoms do not get better, return to the emergency room or call your doctor right away. A visit to the emergency room is only one step in your treatment. Even if you feel better, you still need to do what your doctor recommends, such as going to all suggested follow-up appointments and taking medicines exactly as directed. This will help you recover and help prevent future problems. How can you care for yourself at home? · Drink plenty of fluids to prevent dehydration. If you have kidney, heart, or liver disease and have to limit fluids, talk with your doctor before you increase your fluid intake. When should you call for help? Call 911 if: · You have chest pain or pressure. This may occur with: ¨ Sweating. ¨ Shortness of breath. ¨ Nausea or vomiting. ¨ Pain that spreads from the chest to the neck, jaw, or one or both shoulders or arms. ¨ Dizziness or lightheadedness. ¨ A fast or uneven pulse. After calling 911, chew 1 adult-strength aspirin. Wait for an ambulance. Do not try to drive yourself. · You have signs of a stroke. These may include: 
¨ Sudden numbness, paralysis, or weakness in your face, arm, or leg, especially on only one side of your body. ¨ New problems with walking or balance. ¨ Sudden vision changes. ¨ Drooling or slurred speech. ¨ New problems speaking or understanding simple statements, or feeling confused. ¨ A sudden, severe headache that is different from past headaches. Return to the emergency room now if: 
· You passed out (lost consciousness) again. · You have a seizure. Where can you learn more? Go to BookLending.com.be Enter Y018 in the search box to learn more about \"Fainting: After Your Visit to the Emergency Room. \"  
© 8096-2933 Healthwise, Incorporated. Care instructions adapted under license by [employer] (which disclaims liability or warranty for this information). This care instruction is for use with your licensed healthcare professional. If you have questions about a medical condition or this instruction, always ask your healthcare professional. [address] any warranty or liability for your use of this information. Content Version: 9.4.59011; Last Revised: September 21, 2010 Please provide this summary of care documentation to your next provider. Signatures-by signing, you are acknowledging that this After Visit Summary has been reviewed with you and you have received a copy. Patient Signature:  ____________________________________________________________ Date:  ____________________________________________________________  
  
Elie Landsman Provider Signature:  ____________________________________________________________ Date:  ____________________________________________________________

## 2018-04-07 NOTE — ED NOTES
79 yo M who presents due to dizziness that occurred at work. Notes he felt warm. Denies chest pain, dyspnea, extremity weakness. Admission in February for UGIB. Pt denies n/v, blood in stool. I performed a brief evaluation, including history and physical, of the patient here in triage and I have determined that pt will need further treatment and evaluation from the main side ER physician. I have placed initial orders to help in expediting patients care. April 07, 2018 at 4:28 PM - Bambi Maria        Visit Vitals    /65 (BP 1 Location: Left arm, BP Patient Position: At rest)    Pulse 85    Temp 96.9 °F (36.1 °C)    Resp 17    SpO2 98%        7:00 PM  Call from radiologist, Dr. Jason Borden. CT brain shows punctate hyperdensity R frontal lobe. No other acute findings.

## 2018-04-08 ENCOUNTER — APPOINTMENT (OUTPATIENT)
Dept: MRI IMAGING | Age: 83
DRG: 312 | End: 2018-04-08
Attending: EMERGENCY MEDICINE
Payer: MEDICARE

## 2018-04-08 LAB
CK MB CFR SERPL CALC: 1.5 % (ref 0–4)
CK MB SERPL-MCNC: 2.9 NG/ML (ref 5–25)
CK SERPL-CCNC: 190 U/L (ref 39–308)
TROPONIN I SERPL-MCNC: 0.02 NG/ML (ref 0–0.04)

## 2018-04-08 PROCEDURE — 36415 COLL VENOUS BLD VENIPUNCTURE: CPT | Performed by: FAMILY MEDICINE

## 2018-04-08 PROCEDURE — 70551 MRI BRAIN STEM W/O DYE: CPT

## 2018-04-08 PROCEDURE — 84484 ASSAY OF TROPONIN QUANT: CPT | Performed by: FAMILY MEDICINE

## 2018-04-08 PROCEDURE — 74011250637 HC RX REV CODE- 250/637: Performed by: FAMILY MEDICINE

## 2018-04-08 PROCEDURE — 65660000000 HC RM CCU STEPDOWN

## 2018-04-08 RX ADMIN — PANTOPRAZOLE SODIUM 40 MG: 40 TABLET, DELAYED RELEASE ORAL at 08:49

## 2018-04-08 RX ADMIN — GABAPENTIN 100 MG: 100 CAPSULE ORAL at 22:27

## 2018-04-08 RX ADMIN — TAMSULOSIN HYDROCHLORIDE 0.4 MG: 0.4 CAPSULE ORAL at 08:49

## 2018-04-08 RX ADMIN — AMLODIPINE BESYLATE 10 MG: 10 TABLET ORAL at 08:49

## 2018-04-08 RX ADMIN — PANTOPRAZOLE SODIUM 40 MG: 40 TABLET, DELAYED RELEASE ORAL at 16:08

## 2018-04-08 RX ADMIN — SUCRALFATE 1 G: 1 SUSPENSION ORAL at 16:08

## 2018-04-08 RX ADMIN — SUCRALFATE 1 G: 1 SUSPENSION ORAL at 08:50

## 2018-04-08 RX ADMIN — GABAPENTIN 100 MG: 100 CAPSULE ORAL at 08:49

## 2018-04-08 RX ADMIN — GABAPENTIN 100 MG: 100 CAPSULE ORAL at 16:08

## 2018-04-08 RX ADMIN — SUCRALFATE 1 G: 1 SUSPENSION ORAL at 11:30

## 2018-04-08 RX ADMIN — SUCRALFATE 1 G: 1 SUSPENSION ORAL at 22:26

## 2018-04-08 NOTE — ROUTINE PROCESS
Primary Nurse Monet Grant RN and SHELDON Shaver RN performed a dual skin assessment on this patient No impairment noted  Luis Enrique score is 22

## 2018-04-08 NOTE — ROUTINE PROCESS
Bedside, Verbal and Written shift change report given to [address] [place] [address] (oncoming nurse) by Bridger AMATO(offgoing nurse). Report included the following information SBAR, Kardex, and MAR. Hourly rounding and  chart checks completed.

## 2018-04-08 NOTE — H&P
History and Physical    Patient: Chan Morejon MRN: [id number]  SSN: [id number]    YOB: 1934  Age: 80 y.o. Sex: male      Subjective:      Chan Morejon is a 80 y.o. 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 Patient presented to the R. Symptoms have resolved but CT showed possible punctate hemorrhage frontal lobe. Patient to be admitted for further evaluation. 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 Ortega Aranda Left 6/2015      Family History   Problem Relation Age of Onset    Family history unknown: Yes     Social History   Substance Use Topics    Smoking status: Former Smoker     Packs/day: 1.00     Years: 25.00     Quit date: 6/1/1990    Smokeless tobacco: Never Used    Alcohol use 0.5 oz/week     1 Shots of liquor per week      Prior to Admission medications    Medication Sig Start Date End Date Taking? Authorizing Provider   pantoprazole (PROTONIX) 40 mg tablet Take 1 Tab by mouth Before breakfast and dinner. 2/18/18   Robyn Cummins MD   sucralfate (CARAFATE) 100 mg/mL suspension Take 10 mL by mouth Before breakfast, lunch, dinner and at bedtime. 2/18/18   Robyn Cummins MD   acetaminophen (TYLENOL) 325 mg tablet Take 2 Tabs by mouth as needed for Fever.  2/18/18   Robyn Cummins MD   amLODIPine (NORVASC) 10 mg tablet  7/25/17   Historical Provider   cyclobenzaprine (FLEXERIL) 10 mg tablet take 1 tablet by mouth three times a day if needed for muscle spasm 9/11/17   Mariajose Duncan PA-C   ferrous sulfate 325 mg (65 mg iron) tablet take 1 tablet by mouth once daily 5/25/17   Historical Provider   tamsulosin (FLOMAX) 0.4 mg capsule Take 1 Cap by mouth daily. 6/22/17   Jodeane Apley, MD   gabapentin (NEURONTIN) 100 mg capsule Take  by mouth three (3) times daily. Historical Provider        No Known Allergies    Review of Systems:  Review of systems not obtained due to patient factors. Objective:     Vitals:    04/08/18 0000 04/08/18 0400 04/08/18 0600 04/08/18 0800   BP: 168/55 145/65 136/72 132/69   Pulse: 73 (!) 58 78 85   Resp: 17 17 17 16   Temp: 97.9 °F (36.6 °C) 98 °F (36.7 °C) 98.3 °F (36.8 °C) 98.8 °F (37.1 °C)   SpO2: 98% 100% 99% 90%        Physical Exam:  GENERAL: alert, cooperative, no distress, appears older than stated age  THROAT & NECK: normal and no erythema or exudates noted. LUNG: clear to auscultation bilaterally  HEART: regular rate and rhythm  ABDOMEN: soft, non-tender. Bowel sounds normal. No masses,  no organomegaly  NEUROLOGIC: negative    Assessment:     Hospital Problems  Date Reviewed: 4/7/2018          Codes Class Noted POA    Near syncope ICD-10-CM: R55  ICD-9-CM: 780.2  4/7/2018 Unknown        HTN (hypertension) (Chronic) ICD-10-CM: I10  ICD-9-CM: 401.9  6/10/2015 Yes              Plan:     Serial cardiac enzymes. MRI.      Signed By: Hermann Ziegler MD     April 8, 2018

## 2018-04-08 NOTE — ROUTINE PROCESS
TRANSFER - IN REPORT:    Verbal report received from Major Suarezlim RN(name) on Alejandra Goff  being received from ED(unit) for routine progression of care      Report consisted of patients Situation, Background, Assessment and   Recommendations(SBAR). Information from the following report(s) SBAR, Kardex, ED Summary and Intake/Output was reviewed with the receiving nurse. Opportunity for questions and clarification was provided. Assessment completed upon patients arrival to unit and care assumed.

## 2018-04-09 VITALS
RESPIRATION RATE: 18 BRPM | OXYGEN SATURATION: 100 % | BODY MASS INDEX: 23.01 KG/M2 | HEART RATE: 68 BPM | SYSTOLIC BLOOD PRESSURE: 159 MMHG | DIASTOLIC BLOOD PRESSURE: 72 MMHG | TEMPERATURE: 98 F | WEIGHT: 165 LBS

## 2018-04-09 LAB
ATRIAL RATE: 83 BPM
CALCULATED P AXIS, ECG09: 74 DEGREES
CALCULATED R AXIS, ECG10: 2 DEGREES
CALCULATED T AXIS, ECG11: 27 DEGREES
DIAGNOSIS, 93000: NORMAL
P-R INTERVAL, ECG05: 144 MS
Q-T INTERVAL, ECG07: 370 MS
QRS DURATION, ECG06: 80 MS
QTC CALCULATION (BEZET), ECG08: 434 MS
VENTRICULAR RATE, ECG03: 83 BPM

## 2018-04-09 PROCEDURE — 74011250637 HC RX REV CODE- 250/637: Performed by: FAMILY MEDICINE

## 2018-04-09 RX ADMIN — GABAPENTIN 100 MG: 100 CAPSULE ORAL at 10:43

## 2018-04-09 RX ADMIN — AMLODIPINE BESYLATE 10 MG: 10 TABLET ORAL at 10:43

## 2018-04-09 RX ADMIN — TAMSULOSIN HYDROCHLORIDE 0.4 MG: 0.4 CAPSULE ORAL at 10:43

## 2018-04-09 NOTE — DISCHARGE SUMMARY
Discharge Summary     Patient: Keyon Beebe MRN: [id number]  SSN: [id number]    YOB: 1934  Age: 80 y.o. Sex: male       Admit Date: 4/7/2018    Discharge Date: 4/9/2018      Admission Diagnoses: Near syncope    Discharge Diagnoses:   Problem List as of 4/9/2018  Date Reviewed: 4/7/2018          Codes Class Noted - Resolved    Near syncope ICD-10-CM: R55  ICD-9-CM: 780.2  4/7/2018 - Present        Esophageal ulcer ICD-10-CM: K22.10  ICD-9-CM: 530.20  2/17/2018 - Present        Syncope ICD-10-CM: R55  ICD-9-CM: 780.2  2/15/2018 - Present        GI bleed ICD-10-CM: K92.2  ICD-9-CM: 578.9  2/15/2018 - Present        Anemia ICD-10-CM: D64.9  ICD-9-CM: 285.9  2/15/2018 - Present        Synovial cyst, RT L4-5 ICD-10-CM: M71.30  ICD-9-CM: 727.40  10/12/2017 - Present        Spinal stenosis of lumbar region (Chronic) ICD-10-CM: M48.061  ICD-9-CM: 724.02  12/8/2016 - Present        Hip arthritis ICD-10-CM: M16.10  ICD-9-CM: 716.95  2/2/2016 - Present        Left hip pain ICD-10-CM: M25.552  ICD-9-CM: 719.45  Unknown - Present        Wears glasses ICD-10-CM: Z97.3  ICD-9-CM: V49.89  Unknown - Present        Retention of urine ICD-10-CM: R33.9  ICD-9-CM: 788.20  6/25/2015 - Present        HTN (hypertension) (Chronic) ICD-10-CM: I10  ICD-9-CM: 401.9  6/10/2015 - Present        Osteoarthrosis, unspecified whether generalized or localized, pelvic region and thigh ICD-10-CM: M16.9  ICD-9-CM: 715.95  6/9/2015 - Present               Discharge Condition: Stable    Hospital Course: Patient admitted due to episode of acute throat tightnesss with dizziness and chest pain while working. Patient presented to the ER. Blood pressure stable but question of bleed on CT. Patient felt back to normal after a few hours and did not have further symptoms. Patient had 3 negative troponins. Patient underwent MRI of the brain with reading this am of no bleed. Patient stable so will be discharged to home.     Consults: Neurology    Significant Diagnostic Studies: labs: troponin and radiology: MRI: negative and CT scan: question of bleed vs artifact    Disposition: home    Discharge Medications:   Current Discharge Medication List      CONTINUE these medications which have NOT CHANGED    Details   pantoprazole (PROTONIX) 40 mg tablet Take 1 Tab by mouth Before breakfast and dinner. Qty: 60 Tab, Refills: 0      sucralfate (CARAFATE) 100 mg/mL suspension Take 10 mL by mouth Before breakfast, lunch, dinner and at bedtime. Qty: 414 mL, Refills: 0      acetaminophen (TYLENOL) 325 mg tablet Take 2 Tabs by mouth as needed for Fever. Qty: 50 Tab, Refills: 0      amLODIPine (NORVASC) 10 mg tablet Refills: 0      cyclobenzaprine (FLEXERIL) 10 mg tablet take 1 tablet by mouth three times a day if needed for muscle spasm  Qty: 60 Tab, Refills: 1      ferrous sulfate 325 mg (65 mg iron) tablet take 1 tablet by mouth once daily  Refills: 0    Associated Diagnoses: Retention of urine; Benign non-nodular prostatic hyperplasia with lower urinary tract symptoms      tamsulosin (FLOMAX) 0.4 mg capsule Take 1 Cap by mouth daily. Qty: 90 Cap, Refills: 3      gabapentin (NEURONTIN) 100 mg capsule Take  by mouth three (3) times daily. Activity: Activity as tolerated  Diet: Cardiac Diet  Wound Care: None needed    Follow-up Appointments   Procedures    FOLLOW UP VISIT Appointment in: One Week     Standing Status:   Standing     Number of Occurrences:   1     Order Specific Question:   Appointment in     Answer:    One Week       Signed By: Malachi Jones MD     April 9, 2018

## 2018-04-09 NOTE — PROGRESS NOTES
Care Management Interventions  Current Support Network: Relative's Home  Confirm Follow Up Transport: Family  Plan discussed with Pt/Family/Caregiver: Yes  Discharge Location  Discharge Placement: Home    80 yr old male admit with near syncope. Patient states that he lives with family and was able to verify his home address. Patient states that his is independent with all his ADL's and denies DME/home /[facility] and denies hospitalization in the last 30 days. Patient verbalized no needs at this time and needs identified, possible discharge to home today. Will remain available.

## 2018-04-09 NOTE — DISCHARGE INSTRUCTIONS
Patient armband removed and shredded  MyChart Activation    Thank you for requesting access to Spotster. Please follow the instructions below to securely access and download your online medical record. Spotster allows you to send messages to your doctor, view your test results, renew your prescriptions, schedule appointments, and more. How Do I Sign Up? 1. In your internet browser, go to [email or web address]  2. Click on the First Time User? Click Here link in the Sign In box. You will be redirect to the New Member Sign Up page. 3. Enter your Spotster Access Code exactly as it appears below. You will not need to use this code after youve completed the sign-up process. If you do not sign up before the expiration date, you must request a new code. Spotster Access Code: [email or web address]  Expires: 2018 11:42 AM (This is the date your Spotster access code will )    4. Enter the last four digits of your Social Security Number (xxxx) and Date of Birth (mm/dd/yyyy) as indicated and click Submit. You will be taken to the next sign-up page. 5. Create a Spotster ID. This will be your Spotster login ID and cannot be changed, so think of one that is secure and easy to remember. 6. Create a Spotster password. You can change your password at any time. 7. Enter your Password Reset Question and Answer. This can be used at a later time if you forget your password. 8. Enter your e-mail address. You will receive e-mail notification when new information is available in [address] 9. Click Sign Up. You can now view and download portions of your medical record. 10. Click the Download Summary menu link to download a portable copy of your medical information. Additional Information    If you have questions, please visit the Frequently Asked Questions section of the Spotster website at [email or web address] Remember, Spotster is NOT to be used for urgent needs.  For medical emergencies, dial [address] from Nurse    PATIENT INSTRUCTIONS:    After general anesthesia or intravenous sedation, for 24 hours or while taking prescription Narcotics:  · Limit your activities  · Do not drive and operate hazardous machinery  · Do not make important personal or business decisions  · Do  not drink alcoholic beverages  · If you have not urinated within 8 hours after discharge, please contact your surgeon on call. Report the following to your surgeon:  · Excessive pain, swelling, redness or odor of or around the surgical area  · Temperature over 100.5  · Nausea and vomiting lasting longer than 4 hours or if unable to take medications  · Any signs of decreased circulation or nerve impairment to extremity: change in color, persistent  numbness, tingling, coldness or increase pain  · Any questions    What to do at Home:  Recommended activity: Activity as tolerated    *  Please give a list of your current medications to your Primary Care Provider. *  Please update this list whenever your medications are discontinued, doses are      changed, or new medications (including over-the-counter products) are added. *  Please carry medication information at all times in case of emergency situations. These are general instructions for a healthy lifestyle:    No smoking/ No tobacco products/ Avoid exposure to second hand smoke  Surgeon General's Warning:  Quitting smoking now greatly reduces serious risk to your health. Obesity, smoking, and sedentary lifestyle greatly increases your risk for illness    A healthy diet, regular physical exercise & weight monitoring are important for maintaining a healthy lifestyle    You may be retaining fluid if you have a history of heart failure or if you experience any of the following symptoms:  Weight gain of 3 pounds or more overnight or 5 pounds in a week, increased swelling in our hands or feet or shortness of breath while lying flat in bed.   Please call your doctor as soon as you notice any of these symptoms; do not wait until your next office visit. Recognize signs and symptoms of STROKE:    F-face looks uneven    A-arms unable to move or move unevenly    S-speech slurred or non-existent    T-time-call 911 as soon as signs and symptoms begin-DO NOT go       Back to bed or wait to see if you get better-TIME IS BRAIN. Warning Signs of HEART ATTACK     Call 911 if you have these symptoms:   Chest discomfort. Most heart attacks involve discomfort in the center of the chest that lasts more than a few minutes, or that goes away and comes back. It can feel like uncomfortable pressure, squeezing, fullness, or pain.  Discomfort in other areas of the upper body. Symptoms can include pain or discomfort in one or both arms, the back, neck, jaw, or stomach.  Shortness of breath with or without chest discomfort.  Other signs may include breaking out in a cold sweat, nausea, or lightheadedness. Don't wait more than five minutes to call 911 - MINUTES MATTER! Fast action can save your life. Calling 911 is almost always the fastest way to get lifesaving treatment. Emergency Medical Services staff can begin treatment when they arrive -- up to an hour sooner than if someone gets to the hospital by car. The discharge information has been reviewed with the patient. The patient verbalized understanding. Discharge medications reviewed with the patient and appropriate educational materials and side effects teaching were provided.   ____________________________________________________________________________________________________________________

## 2018-04-09 NOTE — PROGRESS NOTES
I have reviewed discharge instructions with the patient. The patient verbalized understanding. Current Discharge Medication List      CONTINUE these medications which have NOT CHANGED    Details   pantoprazole (PROTONIX) 40 mg tablet Take 1 Tab by mouth Before breakfast and dinner. Qty: 60 Tab, Refills: 0      sucralfate (CARAFATE) 100 mg/mL suspension Take 10 mL by mouth Before breakfast, lunch, dinner and at bedtime. Qty: 414 mL, Refills: 0      acetaminophen (TYLENOL) 325 mg tablet Take 2 Tabs by mouth as needed for Fever. Qty: 50 Tab, Refills: 0      amLODIPine (NORVASC) 10 mg tablet Refills: 0      cyclobenzaprine (FLEXERIL) 10 mg tablet take 1 tablet by mouth three times a day if needed for muscle spasm  Qty: 60 Tab, Refills: 1      ferrous sulfate 325 mg (65 mg iron) tablet take 1 tablet by mouth once daily  Refills: 0    Associated Diagnoses: Retention of urine; Benign non-nodular prostatic hyperplasia with lower urinary tract symptoms      tamsulosin (FLOMAX) 0.4 mg capsule Take 1 Cap by mouth daily. Qty: 90 Cap, Refills: 3      gabapentin (NEURONTIN) 100 mg capsule Take  by mouth three (3) times daily.            Patient armband removed and shredded

## 2018-04-12 ENCOUNTER — OFFICE VISIT (OUTPATIENT)
Dept: ORTHOPEDIC SURGERY | Age: 83
End: 2018-04-12

## 2018-04-12 ENCOUNTER — TELEPHONE (OUTPATIENT)
Dept: ORTHOPEDIC SURGERY | Age: 83
End: 2018-04-12

## 2018-04-12 VITALS
RESPIRATION RATE: 17 BRPM | WEIGHT: 169 LBS | TEMPERATURE: 97.1 F | HEART RATE: 82 BPM | HEIGHT: 71 IN | BODY MASS INDEX: 23.66 KG/M2 | OXYGEN SATURATION: 99 % | SYSTOLIC BLOOD PRESSURE: 149 MMHG | DIASTOLIC BLOOD PRESSURE: 66 MMHG

## 2018-04-12 DIAGNOSIS — M71.30 SYNOVIAL CYST: ICD-10-CM

## 2018-04-12 DIAGNOSIS — M48.062 SPINAL STENOSIS OF LUMBAR REGION WITH NEUROGENIC CLAUDICATION: Primary | Chronic | ICD-10-CM

## 2018-04-12 RX ORDER — GABAPENTIN 100 MG/1
CAPSULE ORAL
Qty: 90 CAP | Refills: 5 | Status: SHIPPED | OUTPATIENT
Start: 2018-04-12 | End: 2018-11-08 | Stop reason: SDUPTHER

## 2018-04-12 NOTE — MR AVS SNAPSHOT
[facility] [address] [place] [address] 
[phone] Patient: Tex Sorto MRN: [id number] :1934 Visit Information Date & Time Provider Department Dept. Phone Encounter #  
 2018 11:30 AM Dilma Barajas MD [facility] [phone] [id number] Follow-up Instructions Return in about 6 months (around 10/12/2018). 2018  9:00 AM  
Any with Diane Morley MD  
[facility] [place] ([address] [facility]) Appt Note: 1 YR FU  
 [address] [facility] [address] [facility]  
  
   
 [address] [place] [id number] Upcoming Health Maintenance Date Due DTaP/Tdap/Td series (1 - Tdap) 1955 ZOSTER VACCINE AGE 60> 1993 GLAUCOMA SCREENING Q2Y 1999 Pneumococcal 65+ Low/Medium Risk (1 of 2 - PCV13) 1999 Influenza Age 5 to Adult 2017 MEDICARE YEARLY EXAM 3/14/2018 Allergies as of 2018  Review Complete On: 2018 By: Mignon Esqueda No Known Allergies Current Immunizations  Reviewed on 2018 No immunizations on file. Not reviewed this visit You Were Diagnosed With   
  
 Codes Comments Spinal stenosis of lumbar region with neurogenic claudication    -  Primary ICD-10-CM: G77.333 
ICD-9-CM: 724.03 Synovial cyst     ICD-10-CM: M71.30 ICD-9-CM: 727.40 Vitals BP Pulse Temp Resp Height(growth percentile) Weight(growth percentile) 149/66 82 97.1 °F (36.2 °C) (Oral) 17 5' 11\" (1.803 m) 169 lb (76.7 kg) SpO2 BMI Smoking Status 99% 23.57 kg/m2 Former Smoker Vitals History BMI and BSA Data Body Mass Index Body Surface Area  
 23.57 kg/m 2 1.96 m 2 Preferred Pharmacy Pharmacy Name Phone [address] [facility], [address] [facility] Your Updated Medication List  
  
   
This list is accurate as of 4/12/18 12:37 PM.  Always use your most recent med list.  
  
  
  
  
 acetaminophen 325 mg tablet Commonly known as:  TYLENOL Take 2 Tabs by mouth as needed for Fever. amLODIPine 10 mg tablet Commonly known as:  NORVASC  
  
 cyclobenzaprine 10 mg tablet Commonly known as:  FLEXERIL  
take 1 tablet by mouth three times a day if needed for muscle spasm  
  
 ferrous sulfate 325 mg (65 mg iron) tablet  
take 1 tablet by mouth once daily  
  
 gabapentin 100 mg capsule Commonly known as:  NEURONTIN Take  by mouth three (3) times daily. pantoprazole 40 mg tablet Commonly known as:  PROTONIX Take 1 Tab by mouth Before breakfast and dinner. sucralfate 100 mg/mL suspension Commonly known as:  Ann Marie  Take 10 mL by mouth Before breakfast, lunch, dinner and at bedtime. tamsulosin 0.4 mg capsule Commonly known as:  FLOMAX Take 1 Cap by mouth daily. Follow-up Instructions Return in about 6 months (around 10/12/2018). Patient Instructions Back Care and Preventing Injuries: Care Instructions Your Care Instructions You can hurt your back doing many everyday activities: lifting a heavy box, bending down to garden, exercising at the gym, and even getting out of bed. But you can keep your back strong and healthy by doing some exercises. You also can follow a few tips for sitting, sleeping, and lifting to avoid hurting your back again. Talk to your doctor before you start an exercise program. Ask for help if you want to learn more about keeping your back healthy. Follow-up care is a key part of your treatment and safety. Be sure to make and go to all appointments, and call your doctor if you are having problems. It's also a good idea to know your test results and keep a list of the medicines you take. How can you care for yourself at home? · Stay at a healthy weight to avoid strain on your lower back. · Do not smoke. Smoking increases the risk of osteoporosis, which weakens the spine. If you need help quitting, talk to your doctor about stop-smoking programs and medicines. These can increase your chances of quitting for good. · Make sure you sleep in a position that maintains your back's normal curves and on a mattress that feels comfortable. Sleep on your side with a pillow between your knees, or sleep on your back with a pillow under your knees. These positions can reduce strain on your back. · When you get out of bed, lie on your side and bend both knees. Drop your feet over the edge of the bed as you push up with both arms. Scoot to the edge of the bed. Make sure your feet are in line with your rear end (buttocks), and then stand up. · If you must stand for a long time, put one foot on a stool, ledge, or box. Exercise to strengthen your back and other muscles · Get at least 30 minutes of exercise on most days of the week. Walking is a good choice. You also may want to do other activities, such as running, swimming, cycling, or playing tennis or team sports. · Stretch your back muscles. Here are few exercises to try: ¨ Lie on your back with your knees bent and your feet flat on the floor. Gently pull one bent knee to your chest. Put that foot back on the floor, and then pull the other knee to your chest. Hold for 15 to 30 seconds. Repeat 2 to 4 times. ¨ Do pelvic tilts. Lie on your back with your knees bent. Tighten your stomach muscles. Pull your belly button (navel) in and up toward your ribs. You should feel like your back is pressing to the floor and your hips and pelvis are slightly lifting off the floor. Hold for 6 seconds while breathing smoothly. · Keep your core muscles strong. The muscles of your back, belly (abdomen), and buttocks support your spine. ¨ Pull in your belly, and imagine pulling your navel toward your spine. Hold this for 6 seconds, then relax. Remember to keep breathing normally as you tense your muscles. ¨ Do curl-ups. Always do them with your knees bent. Keep your low back on the floor, and curl your shoulders toward your knees using a smooth, slow motion. Keep your arms folded across your chest. If this bothers your neck, try putting your hands behind your neck (not your head), with your elbows spread apart. ¨ Lie on your back with your knees bent and your feet flat on the floor. Tighten your belly muscles, and then push with your feet and raise your buttocks up a few inches. Hold this position 6 seconds as you continue to breathe normally, then lower yourself slowly to the floor. Repeat 8 to 12 times. ¨ If you like group exercise, try Pilates or yoga. These classes have poses that strengthen the core muscles. Protect your back when you sit · Place a small pillow, a rolled-up towel, or a lumbar roll in the curve of your back if you need extra support. · Sit in a chair that is low enough to let you place both feet flat on the floor with both knees nearly level with your hips. If your chair or desk is too high, use a foot rest to raise your knees. · When driving, keep your knees nearly level with your hips. Sit straight, and drive with both hands on the steering wheel. Your arms should be in a slightly bent position. · Try a kneeling chair, which helps tilt your hips forward. This takes pressure off your lower back. · Try sitting on an exercise ball. It can rock from side to side, which helps keep your back loose. Lift properly · Squat down, bending at the hips and knees only. If you need to, put one knee to the floor and extend your other knee in front of you, bent at a right angle (half kneeling). · Press your chest straight forward. This helps keep your upper back straight while keeping a slight arch in your low back. · Hold the load as close to your body as possible, at the level of your navel. · Use your feet to change direction, taking small steps. · Lead with your hips as you change direction. Keep your shoulders in line with your hips as you move. Do not twist your body. · Set down your load carefully, squatting with your knees and hips only. When should you call for help? Watch closely for changes in your health, and be sure to contact your doctor if you have any problems. Where can you learn more? Go to http://em-gus.info/. Enter S810 in the search box to learn more about \"Back Care and Preventing Injuries: Care Instructions. \" Current as of: March 21, 2017 Content Version: 11.4 © 6312-9896 [employer]. Care instructions adapted under license by [employer] (which disclaims liability or warranty for this information). If you have questions about a medical condition or this instruction, always ask your healthcare professional. [place] [address] any warranty or liability for your use of this information. Back Stretches: Exercises Your Care Instructions Here are some examples of exercises for stretching your back. Start each exercise slowly. Ease off the exercise if you start to have pain. Your doctor or physical therapist will tell you when you can start these exercises and which ones will work best for you. How to do the exercises Overhead stretch 1. Stand comfortably with your feet shoulder-width apart. 2. Looking straight ahead, raise both arms over your head and reach toward the ceiling. Do not allow your head to tilt back. 3. Hold for 15 to 30 seconds, then lower your arms to your sides. 4. Repeat 2 to 4 times. Side stretch 1. Stand comfortably with your feet shoulder-width apart. 2. Raise one arm over your head, and then lean to the other side. 3. Slide your hand down your leg as you let the weight of your arm gently stretch your side muscles. Hold for 15 to 30 seconds. 4. Repeat 2 to 4 times on each side. Press-up 1. Lie on your stomach, supporting your body with your forearms. 2. Press your elbows down into the floor to raise your upper back. As you do this, relax your stomach muscles and allow your back to arch without using your back muscles. As your press up, do not let your hips or pelvis come off the floor. 3. Hold for 15 to 30 seconds, then relax. 4. Repeat 2 to 4 times. Relax and rest 
 
1. Lie on your back with a rolled towel under your neck and a pillow under your knees. Extend your arms comfortably to your sides. 2. Relax and breathe normally. 3. Remain in this position for about 10 minutes. 4. If you can, do this 2 or 3 times each day. Follow-up care is a key part of your treatment and safety. Be sure to make and go to all appointments, and call your doctor if you are having problems. It's also a good idea to know your test results and keep a list of the medicines you take. Where can you learn more? Go to http://em-gus.info/. Enter M931 in the search box to learn more about \"Back Stretches: Exercises. \" Current as of: March 21, 2017 Content Version: 11.4 © 0637-0870 [employer]. Care instructions adapted under license by [employer] (which disclaims liability or warranty for this information). If you have questions about a medical condition or this instruction, always ask your healthcare professional. [place] [address] any warranty or liability for your use of this information. Introducing [facility]! [employer] introduces ip.access patient portal. Now you can access parts of your medical record, email your doctor's office, and request medication refills online. 1. In your internet browser, go to [email or web address] 2. Click on the First Time User? Click Here link in the Sign In box. You will see the New Member Sign Up page. 3. Enter your Needl Access Code exactly as it appears below. You will not need to use this code after youve completed the sign-up process. If you do not sign up before the expiration date, you must request a new code. · Needl Access Code: [email or web address] Expires: 5/19/2018 11:42 AM 
 
4. Enter the last four digits of your Social Security Number (xxxx) and Date of Birth (mm/dd/yyyy) as indicated and click Submit. You will be taken to the next sign-up page. 5. Create a GreenButtont ID. This will be your Needl login ID and cannot be changed, so think of one that is secure and easy to remember. 6. Create a Needl password. You can change your password at any time. 7. Enter your Password Reset Question and Answer. This can be used at a later time if you forget your password. 8. Enter your e-mail address. You will receive e-mail notification when new information is available in [address] 9. Click Sign Up. You can now view and download portions of your medical record. 10. Click the Download Summary menu link to download a portable copy of your medical information. If you have questions, please visit the Frequently Asked Questions section of the Needl website. Remember, Needl is NOT to be used for urgent needs. For medical emergencies, dial 911. Now available from your iPhone and Android! Please provide this summary of care documentation to your next provider. Your primary care clinician is listed as [facility]. If you have any questions after today's visit, please call [phone].

## 2018-04-12 NOTE — PATIENT INSTRUCTIONS
Back Care and Preventing Injuries: Care Instructions  Your Care Instructions    You can hurt your back doing many everyday activities: lifting a heavy box, bending down to garden, exercising at the gym, and even getting out of bed. But you can keep your back strong and healthy by doing some exercises. You also can follow a few tips for sitting, sleeping, and lifting to avoid hurting your back again. Talk to your doctor before you start an exercise program. Ask for help if you want to learn more about keeping your back healthy. Follow-up care is a key part of your treatment and safety. Be sure to make and go to all appointments, and call your doctor if you are having problems. It's also a good idea to know your test results and keep a list of the medicines you take. How can you care for yourself at home? · Stay at a healthy weight to avoid strain on your lower back. · Do not smoke. Smoking increases the risk of osteoporosis, which weakens the spine. If you need help quitting, talk to your doctor about stop-smoking programs and medicines. These can increase your chances of quitting for good. · Make sure you sleep in a position that maintains your back's normal curves and on a mattress that feels comfortable. Sleep on your side with a pillow between your knees, or sleep on your back with a pillow under your knees. These positions can reduce strain on your back. · When you get out of bed, lie on your side and bend both knees. Drop your feet over the edge of the bed as you push up with both arms. Scoot to the edge of the bed. Make sure your feet are in line with your rear end (buttocks), and then stand up. · If you must stand for a long time, put one foot on a stool, ledge, or box. Exercise to strengthen your back and other muscles  · Get at least 30 minutes of exercise on most days of the week. Walking is a good choice.  You also may want to do other activities, such as running, swimming, cycling, or playing tennis or team sports. · Stretch your back muscles. Here are few exercises to try:  D[place] Ramp on your back with your knees bent and your feet flat on the floor. Gently pull one bent knee to your chest. Put that foot back on the floor, and then pull the other knee to your chest. Hold for 15 to 30 seconds. Repeat 2 to 4 times. ¨ Do pelvic tilts. Lie on your back with your knees bent. Tighten your stomach muscles. Pull your belly button (navel) in and up toward your ribs. You should feel like your back is pressing to the floor and your hips and pelvis are slightly lifting off the floor. Hold for 6 seconds while breathing smoothly. · Keep your core muscles strong. The muscles of your back, belly (abdomen), and buttocks support your spine. ¨ Pull in your belly, and imagine pulling your navel toward your spine. Hold this for 6 seconds, then relax. Remember to keep breathing normally as you tense your muscles. ¨ Do curl-ups. Always do them with your knees bent. Keep your low back on the floor, and curl your shoulders toward your knees using a smooth, slow motion. Keep your arms folded across your chest. If this bothers your neck, try putting your hands behind your neck (not your head), with your elbows spread apart. ¨ Lie on your back with your knees bent and your feet flat on the floor. Tighten your belly muscles, and then push with your feet and raise your buttocks up a few inches. Hold this position 6 seconds as you continue to breathe normally, then lower yourself slowly to the floor. Repeat 8 to 12 times. ¨ If you like group exercise, try Pilates or yoga. These classes have poses that strengthen the core muscles. Protect your back when you sit  · Place a small pillow, a rolled-up towel, or a lumbar roll in the curve of your back if you need extra support. · Sit in a chair that is low enough to let you place both feet flat on the floor with both knees nearly level with your hips.  If your chair or desk is too high, use a foot rest to raise your knees. · When driving, keep your knees nearly level with your hips. Sit straight, and drive with both hands on the steering wheel. Your arms should be in a slightly bent position. · Try a kneeling chair, which helps tilt your hips forward. This takes pressure off your lower back. · Try sitting on an exercise ball. It can rock from side to side, which helps keep your back loose. Lift properly  · Squat down, bending at the hips and knees only. If you need to, put one knee to the floor and extend your other knee in front of you, bent at a right angle (half kneeling). · Press your chest straight forward. This helps keep your upper back straight while keeping a slight arch in your low back. · Hold the load as close to your body as possible, at the level of your navel. · Use your feet to change direction, taking small steps. · Lead with your hips as you change direction. Keep your shoulders in line with your hips as you move. Do not twist your body. · Set down your load carefully, squatting with your knees and hips only. When should you call for help? Watch closely for changes in your health, and be sure to contact your doctor if you have any problems. Where can you learn more? Go to http://em-gus.info/. Enter S810 in the search box to learn more about \"Back Care and Preventing Injuries: Care Instructions. \"  Current as of: March 21, 2017  Content Version: 11.4  © 0693-0132 [employer]. Care instructions adapted under license by [employer] (which disclaims liability or warranty for this information). If you have questions about a medical condition or this instruction, always ask your healthcare professional. [address] any warranty or liability for your use of this information. Back Stretches: Exercises  Your Care Instructions  Here are some examples of exercises for stretching your back. Start each exercise slowly. Ease off the exercise if you start to have pain. Your doctor or physical therapist will tell you when you can start these exercises and which ones will work best for you. How to do the exercises  Overhead stretch    1. Stand comfortably with your feet shoulder-width apart. 2. Looking straight ahead, raise both arms over your head and reach toward the ceiling. Do not allow your head to tilt back. 3. Hold for 15 to 30 seconds, then lower your arms to your sides. 4. Repeat 2 to 4 times. Side stretch    1. Stand comfortably with your feet shoulder-width apart. 2. Raise one arm over your head, and then lean to the other side. 3. Slide your hand down your leg as you let the weight of your arm gently stretch your side muscles. Hold for 15 to 30 seconds. 4. Repeat 2 to 4 times on each side. Press-up    1. Lie on your stomach, supporting your body with your forearms. 2. Press your elbows down into the floor to raise your upper back. As you do this, relax your stomach muscles and allow your back to arch without using your back muscles. As your press up, do not let your hips or pelvis come off the floor. 3. Hold for 15 to 30 seconds, then relax. 4. Repeat 2 to 4 times. Relax and rest    1. Lie on your back with a rolled towel under your neck and a pillow under your knees. Extend your arms comfortably to your sides. 2. Relax and breathe normally. 3. Remain in this position for about 10 minutes. 4. If you can, do this 2 or 3 times each day. Follow-up care is a key part of your treatment and safety. Be sure to make and go to all appointments, and call your doctor if you are having problems. It's also a good idea to know your test results and keep a list of the medicines you take. Where can you learn more? Go to http://em-gus.info/. Enter T980 in the search box to learn more about \"Back Stretches: Exercises. \"  Current as of: March 21, 2017  Content Version: 11.4  © 6132-8683 [employer]. Care instructions adapted under license by [employer] (which disclaims liability or warranty for this information). If you have questions about a medical condition or this instruction, always ask your healthcare professional. [address] any warranty or liability for your use of this information.

## 2018-04-12 NOTE — PROGRESS NOTES
[facility]orraine [facility] [address].  [address], [address]  [place], [address] [place] [address] Street  Phone: [phone]  Fax: [phone]        Zach Salamanca  : 1934  PCP: Angelica Koch MD    PROGRESS NOTE      ASSESSMENT AND PLAN    Diagnoses and all orders for this visit:    1. Spinal stenosis of lumbar region with neurogenic claudication    2. 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 Advised to continue HEP. 2. May use LSO PRN. 3. Continue Gabapentin. May use TID PRN. 4. Discussed spinal injections as possible treatment option. 5. Given information on preventing injury and back stretches. 6. If stable at next visit, may be released to PCP. Follow-up Disposition:  Return in about 6 months (around 10/12/2018). HISTORY OF PRESENT ILLNESS  Fidencio Small is a 80 y.o. male. Pt presents to the office for a 6 month f/u visit for back pain. He restarted his Gabapentin last OV. Pt has good and bad days with his back pain. He has a standing tolerance of about 5-10 minutes. Pt reports pain does radiate into RLE. He notices that he has to shuffle his feet when he walks. He states that he has a short walking tolerance. He begins to have increasing pain after walking a few hundred feet. Pt denies weakness in BLE. He has a + shopping cart sign. Pt denies saddle paresthesias. Pt states he has been using Gabapentin 100 mg BID with some relief. Denies persistent fevers, chills, weight changes, neurogenic bowel or bladder symptoms.  reviewed. Pt was hospitalized from 18 to 18 for near syncope. Pt was also in the ED 2/15/18 and was found to have a bleeding esophogeal ulcer. Pt works at [employer]. If his pain begins to bother him at work, he will grab a shopping cart.      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 Lola Infante Left 6/2015   . MEDICATIONS    Current Outpatient Prescriptions   Medication Sig Dispense Refill    pantoprazole (PROTONIX) 40 mg tablet Take 1 Tab by mouth Before breakfast and dinner. 60 Tab 0    sucralfate (CARAFATE) 100 mg/mL suspension Take 10 mL by mouth Before breakfast, lunch, dinner and at bedtime. 414 mL 0    acetaminophen (TYLENOL) 325 mg tablet Take 2 Tabs by mouth as needed for Fever. 50 Tab 0    amLODIPine (NORVASC) 10 mg tablet   0    cyclobenzaprine (FLEXERIL) 10 mg tablet take 1 tablet by mouth three times a day if needed for muscle spasm 60 Tab 1    ferrous sulfate 325 mg (65 mg iron) tablet take 1 tablet by mouth once daily  0    tamsulosin (FLOMAX) 0.4 mg capsule Take 1 Cap by mouth daily. 90 Cap 3    gabapentin (NEURONTIN) 100 mg capsule Take  by mouth three (3) times daily. ALLERGIES  No Known Allergies       SOCIAL HISTORY    Social History     Social History    Marital status:      Spouse name: N/A    Number of children: N/A    Years of education: N/A     Occupational History    Not on file.      Social History Main Topics    Smoking status: Former Smoker     Packs/day: 1.00     Years: 25.00     Quit date: 6/1/1990    Smokeless tobacco: Never Used    Alcohol use 0.5 oz/week     1 Shots of liquor per week    Drug use: No    Sexual activity: Not on file     Other Topics Concern  Not on file     Social History Narrative       FAMILY HISTORY  Family History   Problem Relation Age of Onset    Family history unknown: Yes       REVIEW OF SYSTEMS  Review of Systems   Constitutional: Negative for chills, fever and weight loss. Respiratory: Negative for shortness of breath. Cardiovascular: Negative for chest pain. Gastrointestinal: Negative for constipation. Negative for fecal incontinence   Genitourinary: Negative for dysuria. Negative for urinary incontinence   Musculoskeletal:        Per HPI   Skin: Negative for rash. Neurological: Negative for dizziness, tremors, focal weakness and headaches. Endo/Heme/Allergies: Does not bruise/bleed easily. Psychiatric/Behavioral: The patient does not have insomnia. PHYSICAL EXAMINATION  Visit Vitals    /66    Pulse 82    Temp 97.1 °F (36.2 °C) (Oral)    Resp 17    Ht 5' 11\" (1.803 m)    Wt 169 lb (76.7 kg)    SpO2 99%    BMI 23.57 kg/m2         Accompanied by family member. Constitutional:  Well developed, well nourished, in no acute distress. Psychiatric: Affect and mood are appropriate. Integumentary: No rashes or abrasions noted on exposed areas. Cardiovascular/Peripheral Vascular: Intact l pulses. No peripheral edema is noted. Lymphatic:  No evidence of lymphedema. No cervical lymphadenopathy. SPINE/MUSCULOSKELETAL EXAM        Lumbar spine:  No rash, ecchymosis, or gross obliquity. No fasciculations. No focal atrophy is noted. No tenderness to palpation at the sciatic notch. SI joints non-tender. Trochanters non tender. MOTOR:       Hip Flex  Quads Hamstrings Ankle DF EHL Ankle PF   Right +4/5 +4/5 +4/5 +4/5 +4/5 +4/5   Left +4/5 +4/5 +4/5 +4/5 +4/5 +4/5       Straight Leg raise negative. No pain with hip ROM. No cog-wheeling. Ambulation without assistive device. FWB. Shuffling gait. Forward flexed.      Written by Jeri Perez, as dictated by Satish Adkins, MD.    I, Dr. Sushma Gonzalez MD, confirm that all documentation is accurate. Mr. Alla Jones may have a reminder for a \"due or due soon\" health maintenance. I have asked that he contact his primary care provider for follow-up on this health maintenance.

## 2018-04-12 NOTE — PROGRESS NOTES
Verbal order entered per Dr. Cheyanne Cunha as documented on blue sheet:  -gabapentin 100 mg, BID routine and TID for flares of pain, disp 90, 5 RF

## 2018-04-12 NOTE — TELEPHONE ENCOUNTER
Flexeril requires a prior authorization under the patient's pharmacy benefit. A request has been initiated through Cover My Meds using (Key: [id number])    Geovanni Munoz (Key: [id number])    The request has received a Pending outcome. Please note any additional information provided by [facility] at the bottom of your screen. You will receive a final determination electronically in CoverMyMeds and via email and fax within 24 to 72 hours. The alternative below do not required a PA    Tizanidine tabs and Baclofen tabs.

## 2018-04-24 ENCOUNTER — HOSPITAL ENCOUNTER (OUTPATIENT)
Dept: NON INVASIVE DIAGNOSTICS | Age: 83
Discharge: HOME | End: 2018-04-24
Attending: FAMILY MEDICINE
Payer: MEDICARE

## 2018-04-24 DIAGNOSIS — R55 SYNCOPE: ICD-10-CM

## 2018-04-24 DIAGNOSIS — R06.02 SHORTNESS OF BREATH: ICD-10-CM

## 2018-04-24 PROCEDURE — 74011250636 HC RX REV CODE- 250/636: Performed by: FAMILY MEDICINE

## 2018-04-24 PROCEDURE — C8929 TTE W OR WO FOL WCON,DOPPLER: HCPCS

## 2018-04-24 RX ADMIN — PERFLUTREN 2 ML: 6.52 INJECTION, SUSPENSION INTRAVENOUS at 13:43

## 2018-04-24 NOTE — PROGRESS NOTES
Echocardiogram with definity completed by Em Green. Report to follow. Patient sade removed and shredded.

## 2018-06-13 ENCOUNTER — HOSPITAL ENCOUNTER (OUTPATIENT)
Dept: GENERAL RADIOLOGY | Age: 83
Discharge: HOME | End: 2018-06-13
Payer: MEDICARE

## 2018-06-13 DIAGNOSIS — M54.9 BACK PAIN: ICD-10-CM

## 2018-06-13 PROCEDURE — 72110 X-RAY EXAM L-2 SPINE 4/>VWS: CPT

## 2018-07-14 ENCOUNTER — HOSPITAL ENCOUNTER (OUTPATIENT)
Age: 83
Discharge: HOME | End: 2018-07-14
Attending: FAMILY MEDICINE
Payer: MEDICARE

## 2018-07-14 DIAGNOSIS — M47.26 OSTEOARTHRITIS OF SPINE WITH RADICULOPATHY, LUMBAR REGION: ICD-10-CM

## 2018-07-14 PROCEDURE — 72148 MRI LUMBAR SPINE W/O DYE: CPT

## 2018-08-02 ENCOUNTER — OFFICE VISIT (OUTPATIENT)
Dept: ORTHOPEDIC SURGERY | Age: 83
End: 2018-08-02

## 2018-08-02 VITALS
HEART RATE: 65 BPM | HEIGHT: 71 IN | SYSTOLIC BLOOD PRESSURE: 133 MMHG | BODY MASS INDEX: 23.66 KG/M2 | RESPIRATION RATE: 16 BRPM | DIASTOLIC BLOOD PRESSURE: 64 MMHG | WEIGHT: 169 LBS

## 2018-08-02 DIAGNOSIS — M48.062 SPINAL STENOSIS OF LUMBAR REGION WITH NEUROGENIC CLAUDICATION: Primary | Chronic | ICD-10-CM

## 2018-08-02 RX ORDER — AMLODIPINE AND BENAZEPRIL HYDROCHLORIDE 10; 20 MG/1; MG/1
CAPSULE ORAL
COMMUNITY
End: 2019-02-14 | Stop reason: ALTCHOICE

## 2018-08-02 RX ORDER — TRAMADOL HYDROCHLORIDE 50 MG/1
50 TABLET ORAL
COMMUNITY
End: 2019-01-17

## 2018-08-02 RX ORDER — NAPROXEN 500 MG/1
500 TABLET, DELAYED RELEASE ORAL 2 TIMES DAILY WITH MEALS
COMMUNITY
End: 2019-01-17

## 2018-08-02 NOTE — PATIENT INSTRUCTIONS
Preventing Falls: Care Instructions  Your Care Instructions    Getting around your home safely can be a challenge if you have injuries or health problems that make it easy for you to fall. Loose rugs and furniture in walkways are among the dangers for many older people who have problems walking or who have poor eyesight. People who have conditions such as arthritis, osteoporosis, or dementia also have to be careful not to fall. You can make your home safer with a few simple measures. Follow-up care is a key part of your treatment and safety. Be sure to make and go to all appointments, and call your doctor if you are having problems. It's also a good idea to know your test results and keep a list of the medicines you take. How can you care for yourself at home? Taking care of yourself  · You may get dizzy if you do not drink enough water. To prevent dehydration, drink plenty of fluids, enough so that your urine is light yellow or clear like water. Choose water and other caffeine-free clear liquids. If you have kidney, heart, or liver disease and have to limit fluids, talk with your doctor before you increase the amount of fluids you drink. · Exercise regularly to improve your strength, muscle tone, and balance. Walk if you can. Swimming may be a good choice if you cannot walk easily. · Have your vision and hearing checked each year or any time you notice a change. If you have trouble seeing and hearing, you might not be able to avoid objects and could lose your balance. · Know the side effects of the medicines you take. Ask your doctor or pharmacist whether the medicines you take can affect your balance. Sleeping pills or sedatives can affect your balance. · Limit the amount of alcohol you drink. Alcohol can impair your balance and other senses. · Ask your doctor whether calluses or corns on your feet need to be removed.  If you wear loose-fitting shoes because of calluses or corns, you can lose your balance and fall. · Talk to your doctor if you have numbness in your feet. Preventing falls at home  · Remove raised doorway thresholds, throw rugs, and clutter. Repair loose carpet or raised areas in the floor. · Move furniture and electrical cords to keep them out of walking paths. · Use nonskid floor wax, and wipe up spills right away, especially on ceramic tile floors. · If you use a walker or cane, put rubber tips on it. If you use crutches, clean the bottoms of them regularly with an abrasive pad, such as steel wool. · Keep your house well lit, especially [address], and outside walkways. Use night-lights in areas such as hallways and bathrooms. Add extra light switches or use remote switches (such as switches that go on or off when you clap your hands) to make it easier to turn lights on if you have to get up during the night. · Install sturdy handrails on stairways. · Move items in your cabinets so that the things you use a lot are on the lower shelves (about waist level). · Keep a cordless phone and a flashlight with new batteries by your bed. If possible, put a phone in each of the main rooms of your house, or carry a cell phone in case you fall and cannot reach a phone. Or, you can wear a device around your neck or wrist. You push a button that sends a signal for help. · Wear low-heeled shoes that fit well and give your feet good support. Use footwear with nonskid soles. Check the heels and soles of your shoes for wear. Repair or replace worn heels or soles. · Do not wear socks without shoes on wood floors. · Walk on the grass when the sidewalks are slippery. If you live in an area that gets snow and ice in the winter, sprinkle salt on slippery steps and sidewalks. Preventing falls in the bath  · Install grab bars and nonskid mats inside and outside your shower or tub and near the toilet and sinks. · Use shower chairs and bath benches.   · Use a hand-held shower head that will allow you to sit while showering. · Get into a tub or shower by putting the weaker leg in first. Get out of a tub or shower with your strong side first.  · Repair loose toilet seats and consider installing a raised toilet seat to make getting on and off the toilet easier. · Keep your bathroom door unlocked while you are in the shower. Where can you learn more? Go to http://em-gus.info/. Enter 0476 [id number] in the search box to learn more about \"Preventing Falls: Care Instructions. \"  Current as of: May 12, 2017  Content Version: 11.7  © 3552-4123 [employer]. Care instructions adapted under license by [employer] (which disclaims liability or warranty for this information). If you have questions about a medical condition or this instruction, always ask your healthcare professional. [place] [address] any warranty or liability for your use of this information. Preventing Outdoor Falls: Care Instructions  Your Care Instructions    Worries about falls don't need to keep you indoors. Outdoor activities like walking have big benefits for your health. You will need to watch your step and learn a few safety measures. If you are worried about having a fall outdoors, ask your doctor about exercises, classes, or physical therapy that may help. You can learn ways to gain strength, flexibility, and balance. Ask if it might help to use a cane or walker. You can make your time outdoors safer with a few simple measures. Follow-up care is a key part of your treatment and safety. Be sure to make and go to all appointments, and call your doctor if you are having problems. It's also a good idea to know your test results and keep a list of the medicines you take. How can you prevent falls outdoors? · Wear shoes with firm soles and low heels. If you have to walk on an icy surface, use grippers that can be worn over your shoes in bad weather.   · Be extra careful if weather is bad. Walk on the grass when the sidewalks are slick. If you live in a place that gets snow and ice in the winter, sprinkle salt on slippery stairs and sidewalks. · Be careful getting on or off buses and trains or getting in and out of cars. If handrails are available, use them. · Be careful when you cross the street. Look for crosswalks or places where curb cuts or ramps are present. · Try not to hurry, especially if you are carrying something. · Be cautious in parking lots or garages. There may be curbs or changes in pavement, or the height of the pavement may vary. · Make sure to wear the correct eyeglasses, if you need them. Reading glasses or bifocals can make it harder to see hazards that might be in your way. · If you are walking outdoors for exercise, try to:  ¨ Walk in well-lighted, well-maintained areas. These include high school or college tracks, shopping malls, and public spaces. ¨ Walk with a partner. ¨ Watch out for cracked sidewalks, curbs, changes in the height of the pavement, exposed tree roots, and debris such as fallen leaves or branches. Where can you learn more? Go to http://em-gus.info/. Enter G354 in the search box to learn more about \"Preventing Outdoor Falls: Care Instructions. \"  Current as of: May 12, 2017  Content Version: 11.7  © 9881-7927 [employer]. Care instructions adapted under license by [employer] (which disclaims liability or warranty for this information). If you have questions about a medical condition or this instruction, always ask your healthcare professional. [place] [address] any warranty or liability for your use of this information.

## 2018-08-02 NOTE — PROGRESS NOTES
[facility] [address].  [address], [address]  [place], [address] [place] [address] Street  Phone: [phone]  Fax: [phone]        Desi Old  : 1934  PCP: Cooper Cabrera MD    PROGRESS NOTE      ASSESSMENT AND PLAN    Diagnoses and all orders for this visit:    1. Spinal stenosis of lumbar region with neurogenic claudication       1. Advised to continue HEP. 2. DC Naprosyn  3. Increase Gabapentin to QID  4. Discussed surgery and injections as treatment options      Follow-up Disposition:  Return in about 1 month (around 2018). HISTORY OF PRESENT ILLNESS  Saima Wolf is a 80 y.o. male. Last visit pt was sent to have a lumbar spine MRI. Images reviewed with the pt, confirms stenosis. Pt reports gradually progressing low back pain. Pt has difficulty rising from sitting and walking intermittently. Numbness RLE. Pt denies groin pain. Pt reports having a cane on hand for use. Location of pain: low back  Does pain radiate into extremities: B/L buttock R>L  Does patient have weakness: no   Pt denies saddle paresthesias. Pt denies incontinence. Medications pt is on: Naprosyn 500mg BID, Flexeril 10mg TID PRN, Gabapentin 100mg every day with no side effects. Denies persistent fevers, chills, weight changes, neurogenic bowel or bladder symptoms. Treatments patient has tried:  Physical therapy:Yes  Non-opioid medications: Yes  Spinal injections: No  Spinal surgery- No.        reviewed. Last fill of Tramadol . Pt visited ED  interim admission for near syncope. Hx of esophageal ulcer, R THR, L hip arthroplasty. Pt works at [employer].       Pain Assessment  2018   Location of Pain Back;Leg   Location Modifiers Left   Severity of Pain 8   Quality of Pain Other (Comment)   Quality of Pain Comment crawling sensation, numbness and tingling   Duration of Pain Persistent   Frequency of Pain Constant   Date Pain First Started -   Aggravating Factors Walking Limiting Behavior Some   Relieving Factors Rest   Relieving Factors Comment -   Result of Injury No     MRI Results (most recent):    Results from Hospital Encounter encounter on 07/14/18   MRI LUMB SPINE WO CONT   Narrative MR LUMBAR SPINE WITHOUT CONTRAST    CPT CODE: 53713    HISTORY: Back pain    COMPARISON: MRI 6/15/2017 and CT 2/15/2018    TECHNIQUE:  Axial and sagittal sequences of lumbar spine using T1, IR, T2  information. FINDINGS: S-shaped scoliosis lumbar spine, apex to the left L4/L5 and to the  right T12/L1. There is minimal retrolisthesis L2 on L3. Alignment otherwise normal.    There is no acute loss of vertebral body height. Marrow edema around the  endplates at concavity of the scoliosis at L4/L5 on the right. No fluid at the  disc space. Conus is unremarkable. Rounded high T2 signal lesions at the liver compatible  with cyst seen on previous CT    Marked disc space narrowing L4/L5. Moderate disc space narrowing elsewhere  throughout the lumbar spine. On sagittal images, mild disc bulge T11/T12. At T12/L1 level, mild bulge of the disc. No central stenosis. Facet arthropathy  and spondylitic changes moderately narrow the left neural foramen. At the L1/L2 level,  broad-based disc bulge flattens the ventral sac. No central  stenosis. Facet hypertrophy and bulging disc moderately narrow left neural  foramen. Right neural foramen patent. At the L2/L3 level,  broad-based disc/osteophyte complex flattening the ventral  sac. Posterolateral impression on the canal due to facet ligamentous  hypertrophy. Borderline stenosis as a result. Mild narrowing left neural foramen  due to facet encroachment and spondylitic changes. At the L3/L4 level,  broad-based disc/osteophyte complex indents ventral sac. Posterolateral impression the canal due to marked facet and ligamentous  hypertrophy resulting in moderate spinal stenosis.  Moderate left foraminal  narrowing due to bulging disc and facet encroachment. Moderate right foraminal  narrowing due to facet encroachment. At the L4/L5 level,  broad-based disc/osteophyte complex indents ventral sac. Posterolateral impression the canal due to marked ligamentous and moderate facet  hypertrophy resulting in severe central stenosis. Marked narrowing right neural  foramen due to disc/osteophyte complex and facet encroachment. Moderate  narrowing left neural foramen due to bulging disc and facet encroachment. At the L5/S1 level,  broad-based disc bulge indents ventral sac. No central  stenosis. Moderate facet hypertrophy. Uncovering of the disc and facet  encroachment mildly narrows the right neural foramen. Impression IMPRESSION:    Multiple levels stable foraminal narrowing, most marked on the right L4/L5 and  moderately severe on the left L4/L5, bilaterally L3/L4. Multiple levels degenerative disc disease with multifactorial severe spinal  stenosis L4/L5, similar and moderately severe stenosis L3/L4, mildly worsened. Multiple levels facet arthropathy as above. Hepatic cysts. PAST MEDICAL HISTORY   Past Medical History:   Diagnosis Date    Arthritis     Chronic pain     left hip and lower back    Hypertension     Left hip pain     resolved since surgery    Thromboembolus ([address] 1975    brain, [address] [place] [address] Wears glasses        Past Surgical History:   Procedure Laterality Date    FOOT/TOES SURGERY PROC UNLISTED      HX HIP REPLACEMENT      HX OTHER SURGICAL  1975    brain surgery, Dr. Tram Case Left 6/2015   . MEDICATIONS    Current Outpatient Prescriptions   Medication Sig Dispense Refill    traMADol (ULTRAM) 50 mg tablet Take 50 mg by mouth every six (6) hours as needed for Pain.  naproxen EC (NAPROSYN EC) 500 mg EC tablet Take 500 mg by mouth two (2) times daily (with meals).  amLODIPine-benazepril (LOTREL) 10-20 mg per capsule Take 1 Cap by Mouth Every Morning.       tamsulosin (FLOMAX) 0.4 mg capsule take 1 capsule by mouth once daily 90 Cap 0    gabapentin (NEURONTIN) 100 mg capsule 1 tab twice a day for routine and three times a day for flare ups of pain 90 Cap 5    pantoprazole (PROTONIX) 40 mg tablet Take 1 Tab by mouth Before breakfast and dinner. 60 Tab 0    sucralfate (CARAFATE) 100 mg/mL suspension Take 10 mL by mouth Before breakfast, lunch, dinner and at bedtime. 414 mL 0    acetaminophen (TYLENOL) 325 mg tablet Take 2 Tabs by mouth as needed for Fever. 50 Tab 0    amLODIPine (NORVASC) 10 mg tablet   0    cyclobenzaprine (FLEXERIL) 10 mg tablet take 1 tablet by mouth three times a day if needed for muscle spasm 60 Tab 1    ferrous sulfate 325 mg (65 mg iron) tablet take 1 tablet by mouth once daily  0    gabapentin (NEURONTIN) 100 mg capsule Take  by mouth three (3) times daily. ALLERGIES  No Known Allergies       SOCIAL HISTORY    Social History     Social History    Marital status:      Spouse name: N/A    Number of children: N/A    Years of education: N/A     Occupational History    Not on file. Social History Main Topics    Smoking status: Former Smoker     Packs/day: 1.00     Years: 25.00     Quit date: 6/1/1990    Smokeless tobacco: Never Used    Alcohol use 0.5 oz/week     1 Shots of liquor per week    Drug use: No    Sexual activity: Not on file     Other Topics Concern    Not on file     Social History Narrative       FAMILY HISTORY  Family History   Problem Relation Age of Onset    Family history unknown: Yes       REVIEW OF SYSTEMS  Review of Systems   Constitutional: Negative for chills, fever and weight loss. Respiratory: Negative for shortness of breath. Cardiovascular: Negative for chest pain. Gastrointestinal: Negative for constipation. Negative for fecal incontinence   Genitourinary: Negative for dysuria. Negative for urinary incontinence   Musculoskeletal:        Per HPI   Skin: Negative for rash. Neurological: Positive for tingling. Negative for dizziness, tremors, focal weakness and headaches. Endo/Heme/Allergies: Does not bruise/bleed easily. Psychiatric/Behavioral: The patient does not have insomnia. PHYSICAL EXAMINATION  Visit Vitals    /64    Pulse 65    Resp 16    Ht 5' 11\" (1.803 m)    Wt 169 lb (76.7 kg)    BMI 23.57 kg/m2         Accompanied by self. Constitutional:  Well developed, well nourished, in no acute distress. Psychiatric: Affect and mood are appropriate. Integumentary: No rashes or abrasions noted on exposed areas. Cardiovascular/Peripheral Vascular: Intact l pulses. No peripheral edema is noted. Lymphatic:  No evidence of lymphedema. No cervical lymphadenopathy. SPINE/MUSCULOSKELETAL EXAM      Lumbar spine:  No rash, ecchymosis, or gross obliquity. No fasciculations. No focal atrophy is noted. Tenderness to palpation midline L4-5. No tenderness to palpation at the sciatic notch. SI joints non-tender. Trochanters non tender. Sensation grossly intact to light touch. MOTOR:       Hip Flex  Quads Hamstrings Ankle DF EHL Ankle PF   Right +4/5 +4/5 +4/5 +4/5 +4/5 +4/5   Left +4/5 +4/5 +4/5 +4/5 +4/5 +4/5     Forward flexed    Straight Leg raise negative. Ambulation without assistive device. FWB. Written by Zakiya Lei, as dictated by Jose Aguirre MD.    I, Dr. Jose Aguirre MD, confirm that all documentation is accurate. Mr. Kat Teran may have a reminder for a \"due or due soon\" health maintenance. I have asked that he contact his primary care provider for follow-up on this health maintenance.

## 2018-08-02 NOTE — PROGRESS NOTES
A user error has taken place: Patient will be seeing Dr. Merary Santiago today to go over his MRI Lumbar spine results.

## 2018-08-28 ENCOUNTER — HOSPITAL ENCOUNTER (OUTPATIENT)
Age: 83
Setting detail: OUTPATIENT SURGERY
Discharge: HOME | End: 2018-08-28
Attending: PHYSICAL MEDICINE & REHABILITATION | Admitting: PHYSICAL MEDICINE & REHABILITATION
Payer: MEDICARE

## 2018-08-28 ENCOUNTER — APPOINTMENT (OUTPATIENT)
Dept: GENERAL RADIOLOGY | Age: 83
End: 2018-08-28
Attending: PHYSICAL MEDICINE & REHABILITATION
Payer: MEDICARE

## 2018-08-28 VITALS
RESPIRATION RATE: 16 BRPM | BODY MASS INDEX: 23.66 KG/M2 | HEART RATE: 60 BPM | SYSTOLIC BLOOD PRESSURE: 116 MMHG | OXYGEN SATURATION: 100 % | TEMPERATURE: 98.3 F | WEIGHT: 169 LBS | DIASTOLIC BLOOD PRESSURE: 60 MMHG | HEIGHT: 71 IN

## 2018-08-28 PROCEDURE — 74011636320 HC RX REV CODE- 636/320

## 2018-08-28 PROCEDURE — 74011250637 HC RX REV CODE- 250/637: Performed by: PHYSICAL MEDICINE & REHABILITATION

## 2018-08-28 PROCEDURE — 74011250636 HC RX REV CODE- 250/636

## 2018-08-28 RX ORDER — DIAZEPAM 5 MG/1
2.5-1 TABLET ORAL ONCE
Status: COMPLETED | OUTPATIENT
Start: 2018-08-28 | End: 2018-08-28

## 2018-08-28 RX ADMIN — DIAZEPAM 5 MG: 5 TABLET ORAL at 12:32

## 2018-08-28 NOTE — DISCHARGE INSTRUCTIONS
[facility]   (JACQUELYN)  Dr. Aryan Rojas, Dr. Neema Matthews, Dr. Kim Ours not drive a car, operate heavy machinery or dangerous equipment for 24 hours. * Activity as tolerated; rest for the remainder of the day. * Resume pre-block medications including those for your family doctor. * Do not drink alcoholic beverages for 24 hours. Alcohol and the medications you have received may interact and cause an adverse reaction. * You may feel better this evening and worse tomorrow, as the numbing medications wears off and the steroid has yet to begin to work. After 48 hrs the steroid should begin to release bringing you relief. * You may shower this evening and remove any bandages. * Avoid hot tubs and heating pads for 24 hours. You may use cold packs on the procedure site as tolerated for the first 24 hours. * If a headache develops, drink plenty of fluids and rest.  Take over the counter medications for headache if needed. If the headache continues longer than 24 hours, call MD at the [address] [place] [address]. [phone]    * Continue taking pain medications as needed. * You may resume your regular diet if tolerated. Otherwise, start with sips of water and advance slowly. * If Diabetic: check your blood sugar three times a day for the next 3 days. If your sugar is greater than 300 call your family doctor. If your sugar is greater than 400, have someone transport you to the nearest Emergency Room. * If you experience any of the following problems, Please Call the [address] [place] [address] at [phone].         * Shortness of Breath    * Fever of 101 or higher    * Nausea / Vomiting    * Severe Headache    * Weakness or numbness in arms or legs that is not      resolving    * Prolonged increase in pain greater than 4 days      DISCHARGE SUMMARY from Nurse      PATIENT INSTRUCTIONS:    After oral sedation, for 24 hours or while taking prescription Narcotics:  · Limit your activities  · Do not drive and operate hazardous machinery  · Do not make important personal or business decisions  · Do  not drink alcoholic beverages  · If you have not urinated within 8 hours after discharge, please contact your surgeon on call. Report the following to your surgeon:  · Excessive pain, swelling, redness or odor of or around the surgical area  · Temperature over 101  · Nausea and vomiting lasting longer than 4 hours or if unable to take medications  · Any signs of decreased circulation or nerve impairment to extremity: change in color, persistent  numbness, tingling, coldness or increase pain  · Any questions            What to do at Home:  Recommended activity: Activity as tolerated, NO DRIVING FOR 12 Hours post injection          *  Please give a list of your current medications to your Primary Care Provider. *  Please update this list whenever your medications are discontinued, doses are      changed, or new medications (including over-the-counter products) are added. *  Please carry medication information at all times in case of emergency situations. These are general instructions for a healthy lifestyle:    No smoking/ No tobacco products/ Avoid exposure to second hand smoke    Surgeon General's Warning:  Quitting smoking now greatly reduces serious risk to your health. Obesity, smoking, and sedentary lifestyle greatly increases your risk for illness    A healthy diet, regular physical exercise & weight monitoring are important for maintaining a healthy lifestyle    You may be retaining fluid if you have a history of heart failure or if you experience any of the following symptoms:  Weight gain of 3 pounds or more overnight or 5 pounds in a week, increased swelling in our hands or feet or shortness of breath while lying flat in bed.   Please call your doctor as soon as you notice any of these symptoms; do not wait until your next office visit. Recognize signs and symptoms of STROKE:    F-face looks uneven    A-arms unable to move or move unevenly    S-speech slurred or non-existent    T-time-call 911 as soon as signs and symptoms begin-DO NOT go       Back to bed or wait to see if you get better-TIME IS BRAIN.

## 2018-10-09 ENCOUNTER — APPOINTMENT (OUTPATIENT)
Dept: GENERAL RADIOLOGY | Age: 83
End: 2018-10-09
Attending: PHYSICAL MEDICINE & REHABILITATION
Payer: MEDICARE

## 2018-10-09 ENCOUNTER — HOSPITAL ENCOUNTER (OUTPATIENT)
Age: 83
Setting detail: OUTPATIENT SURGERY
Discharge: HOME | End: 2018-10-09
Attending: PHYSICAL MEDICINE & REHABILITATION | Admitting: PHYSICAL MEDICINE & REHABILITATION
Payer: MEDICARE

## 2018-10-09 VITALS
TEMPERATURE: 98.1 F | HEART RATE: 63 BPM | RESPIRATION RATE: 16 BRPM | OXYGEN SATURATION: 100 % | SYSTOLIC BLOOD PRESSURE: 148 MMHG | BODY MASS INDEX: 22.4 KG/M2 | DIASTOLIC BLOOD PRESSURE: 66 MMHG | HEIGHT: 71 IN | WEIGHT: 160 LBS

## 2018-10-09 PROCEDURE — 74011636320 HC RX REV CODE- 636/320: Performed by: PHYSICAL MEDICINE & REHABILITATION

## 2018-10-09 PROCEDURE — 74011636320 HC RX REV CODE- 636/320

## 2018-10-09 PROCEDURE — 74011250636 HC RX REV CODE- 250/636: Performed by: PHYSICAL MEDICINE & REHABILITATION

## 2018-10-09 PROCEDURE — 77030003543 HC NDL EPDRL BBMI -A: Performed by: PHYSICAL MEDICINE & REHABILITATION

## 2018-10-09 PROCEDURE — 77030014124 HC TY EPDRL BBMI -A: Performed by: PHYSICAL MEDICINE & REHABILITATION

## 2018-10-09 PROCEDURE — 76010000009 HC PAIN MGT 0 TO 30 MIN PROC: Performed by: PHYSICAL MEDICINE & REHABILITATION

## 2018-10-09 PROCEDURE — 74011250636 HC RX REV CODE- 250/636

## 2018-10-09 PROCEDURE — 74011250637 HC RX REV CODE- 250/637: Performed by: PHYSICAL MEDICINE & REHABILITATION

## 2018-10-09 RX ORDER — DEXAMETHASONE SODIUM PHOSPHATE 100 MG/10ML
INJECTION INTRAMUSCULAR; INTRAVENOUS AS NEEDED
Status: DISCONTINUED | OUTPATIENT
Start: 2018-10-09 | End: 2018-10-30 | Stop reason: HOSPADM

## 2018-10-09 RX ORDER — LIDOCAINE HYDROCHLORIDE 10 MG/ML
INJECTION, SOLUTION EPIDURAL; INFILTRATION; INTRACAUDAL; PERINEURAL AS NEEDED
Status: DISCONTINUED | OUTPATIENT
Start: 2018-10-09 | End: 2018-10-30 | Stop reason: HOSPADM

## 2018-10-09 RX ORDER — DIAZEPAM 5 MG/1
2.5-1 TABLET ORAL ONCE
Status: COMPLETED | OUTPATIENT
Start: 2018-10-09 | End: 2018-10-09

## 2018-10-09 RX ADMIN — DIAZEPAM 5 MG: 5 TABLET ORAL at 09:57

## 2018-10-09 NOTE — DISCHARGE INSTRUCTIONS
[facility]   (JACQUELYN)  Dr. Montana [place], Dr. Cyndy Norton, Dr. Joi Guardado not drive a car, operate heavy machinery or dangerous equipment for 24 hours. * Activity as tolerated; rest for the remainder of the day. * Resume pre-block medications including those for your family doctor. * Do not drink alcoholic beverages for 24 hours. Alcohol and the medications you have received may interact and cause an adverse reaction. * You may feel better this evening and worse tomorrow, as the numbing medications wears off and the steroid has yet to begin to work. After 48 hrs the steroid should begin to release bringing you relief. * You may shower this evening and remove any bandages. * Avoid hot tubs and heating pads for 24 hours. You may use cold packs on the procedure site as tolerated for the first 24 hours. * If a headache develops, drink plenty of fluids and rest.  Take over the counter medications for headache if needed. If the headache continues longer than 24 hours, call MD at the [address] [place] [address]. [phone]    * Continue taking pain medications as needed. * You may resume your regular diet if tolerated. Otherwise, start with sips of water and advance slowly. * If Diabetic: check your blood sugar three times a day for the next 3 days. If your sugar is greater than 300 call your family doctor. If your sugar is greater than 400, have someone transport you to the nearest Emergency Room. * If you experience any of the following problems, Please Call the [address] [place] [address] at [phone].         * Shortness of Breath    * Fever of 101 or higher    * Nausea / Vomiting    * Severe Headache    * Weakness or numbness in arms or legs that is not      resolving    * Prolonged increase in pain greater than 4 days      DISCHARGE SUMMARY from Nurse      PATIENT INSTRUCTIONS:    After oral sedation, for 24 hours or while taking prescription Narcotics:  · Limit your activities  · Do not drive and operate hazardous machinery  · Do not make important personal or business decisions  · Do  not drink alcoholic beverages  · If you have not urinated within 8 hours after discharge, please contact your surgeon on call. Report the following to your surgeon:  · Excessive pain, swelling, redness or odor of or around the surgical area  · Temperature over 101  · Nausea and vomiting lasting longer than 4 hours or if unable to take medications  · Any signs of decreased circulation or nerve impairment to extremity: change in color, persistent  numbness, tingling, coldness or increase pain  · Any questions            What to do at Home:  Recommended activity: Activity as tolerated, NO DRIVING FOR 12 Hours post injection          *  Please give a list of your current medications to your Primary Care Provider. *  Please update this list whenever your medications are discontinued, doses are      changed, or new medications (including over-the-counter products) are added. *  Please carry medication information at all times in case of emergency situations. These are general instructions for a healthy lifestyle:    No smoking/ No tobacco products/ Avoid exposure to second hand smoke    Surgeon General's Warning:  Quitting smoking now greatly reduces serious risk to your health. Obesity, smoking, and sedentary lifestyle greatly increases your risk for illness    A healthy diet, regular physical exercise & weight monitoring are important for maintaining a healthy lifestyle    You may be retaining fluid if you have a history of heart failure or if you experience any of the following symptoms:  Weight gain of 3 pounds or more overnight or 5 pounds in a week, increased swelling in our hands or feet or shortness of breath while lying flat in bed.   Please call your doctor as soon as you notice any of these symptoms; do not wait until your next office visit. Recognize signs and symptoms of STROKE:    F-face looks uneven    A-arms unable to move or move unevenly    S-speech slurred or non-existent    T-time-call 911 as soon as signs and symptoms begin-DO NOT go       Back to bed or wait to see if you get better-TIME IS BRAIN.

## 2018-10-09 NOTE — PROCEDURES
Intralaminar Epidural Steroid Procedure Note        Patient Name   Ray Molina  Date of Procedure: October 9, 2018  Preoperative Diagnosis: Lumbar spinal stenosis  Postoperative Diagnosis: Same  Location [facility], [address]      Procedure:  Epidural Steroid Injection    Consent:  Informed consent was obtained prior to the procedure. In addition to the potential risks associated with the procedure itself, the patient was informed both verbally and in writing of the potential side effects of the use of glucocorticoid. The patient appeared to comprehend the informed consent and desired to have the procedure performed. Procedure in Detail:  The patient was taken to the procedure suite and placed in the prone position on the operating table on appropriate padding. The posterior lumbar region was prepped and draped in the usual sterile fashion. Intraoperative fluoroscopy was used to localize the L4-L5 interspace. The skin was infiltrated with 1% lidocaine. An 18-gauge Tuohy needle was advanced into the epidural space at L4-L5 under fluoroscopic guidance using the loss of resistance technique. No cerebrospinal fluid was seen throughout the procedure. Yes  A small amount of Isovue was injected into the epidural space, confirming appropriated needle placement on fluoroscopy. No vascular uptake was identified. Next, 2ml of 1% Lidocaine and 30mg of preservative free Dexamethasone were injected via the Tuohy needle. The needle was removed from the patient. The patient tolerated the procedure well and was discharged home with designated  and care instructions.       Signed By: Angella Boyer MD                        October 9, 2018

## 2018-10-09 NOTE — H&P
Office Visit 2018 [facility] MUNA Hylton MD  
Physical Medicine and Rehab    Spinal stenosis of lumbar region with neurogenic claudication Dx    Back Pain , Leg Pain Reason for visit Progress Notes Expand AllCollapse All  
  
 
 
[facility] 
[address], [address] [place], [facility] [address] Phone: [phone] Fax: [phone] 
  
  
  
Osito Dias : 1934 PCP: Lulu Johnson MD 
  
PROGRESS NOTE 
  
  
ASSESSMENT AND PLAN Diagnoses and all orders for this visit: 
  
1. Spinal stenosis of lumbar region with neurogenic claudication 
  
  
1. Advised to continue HEP. 2. DC Naprosyn 3. Increase Gabapentin to QID 4. Discussed surgery and injections as treatment options 
  
  
Follow-up Disposition: 
Return in about 1 month (around 2018). HISTORY OF PRESENT ILLNESS Rebekah Sam is a 80 y.o. male. Last visit pt was sent to have a lumbar spine MRI. Images reviewed with the pt, confirms stenosis. Pt reports gradually progressing low back pain. Pt has difficulty rising from sitting and walking intermittently. Numbness RLE. Pt denies groin pain. Pt reports having a cane on hand for use. 
  
Location of pain: low back Does pain radiate into extremities: B/L buttock R>L Does patient have weakness: no  
Pt denies saddle paresthesias. Pt denies incontinence. Medications pt is on: Naprosyn 500mg BID, Flexeril 10mg TID PRN, Gabapentin 100mg every day with no side effects. Denies persistent fevers, chills, weight changes, neurogenic bowel or bladder symptoms.  
  
Treatments patient has tried: 
Physical therapy:Yes Non-opioid medications: Yes Spinal injections: No 
Spinal surgery- No.  
  
  
 reviewed. Last fill of Tramadol . Pt visited ED  interim admission for near syncope. Hx of esophageal ulcer, R THR, L hip arthroplasty.  Pt works at [employer].   
  
 Pain Assessment  8/2/2018 Location of Pain Back;Leg Location Modifiers Left Severity of Pain 8 Quality of Pain Other (Comment) Quality of Pain Comment crawling sensation, numbness and tingling Duration of Pain Persistent Frequency of Pain Constant Date Pain First Started - Aggravating Factors Walking Limiting Behavior Some Relieving Factors Rest  
Relieving Factors Comment - Result of Injury No  
  
MRI Results (most recent): 
  
Results from Hospital Encounter encounter on 07/14/18 MRI LUMB SPINE WO CONT 
  Narrative MR LUMBAR SPINE WITHOUT CONTRAST 
 
CPT CODE: 65548 HISTORY: Back pain COMPARISON: MRI 6/15/2017 and CT 2/15/2018 TECHNIQUE:  Axial and sagittal sequences of lumbar spine using T1, IR, T2 
information. FINDINGS: S-shaped scoliosis lumbar spine, apex to the left L4/L5 and to the 
right T12/L1. There is minimal retrolisthesis L2 on L3. Alignment otherwise normal. 
 
There is no acute loss of vertebral body height. Marrow edema around the 
endplates at concavity of the scoliosis at L4/L5 on the right. No fluid at the 
disc space. Conus is unremarkable. Rounded high T2 signal lesions at the liver compatible 
with cyst seen on previous CT Marked disc space narrowing L4/L5. Moderate disc space narrowing elsewhere 
throughout the lumbar spine. On sagittal images, mild disc bulge T11/T12. At T12/L1 level, mild bulge of the disc. No central stenosis. Facet arthropathy 
and spondylitic changes moderately narrow the left neural foramen. At the L1/L2 level,  broad-based disc bulge flattens the ventral sac. No central 
stenosis. Facet hypertrophy and bulging disc moderately narrow left neural 
foramen. Right neural foramen patent. At the L2/L3 level,  broad-based disc/osteophyte complex flattening the ventral 
sac. Posterolateral impression on the canal due to facet ligamentous 
hypertrophy. Borderline stenosis as a result.  Mild narrowing left neural foramen 
due to facet encroachment and spondylitic changes. At the L3/L4 level,  broad-based disc/osteophyte complex indents ventral sac. Posterolateral impression the canal due to marked facet and ligamentous 
hypertrophy resulting in moderate spinal stenosis. Moderate left foraminal 
narrowing due to bulging disc and facet encroachment. Moderate right foraminal 
narrowing due to facet encroachment. At the L4/L5 level,  broad-based disc/osteophyte complex indents ventral sac. Posterolateral impression the canal due to marked ligamentous and moderate facet 
hypertrophy resulting in severe central stenosis. Marked narrowing right neural 
foramen due to disc/osteophyte complex and facet encroachment. Moderate 
narrowing left neural foramen due to bulging disc and facet encroachment. At the L5/S1 level,  broad-based disc bulge indents ventral sac. No central 
stenosis. Moderate facet hypertrophy. Uncovering of the disc and facet 
encroachment mildly narrows the right neural foramen.     
  
  Impression IMPRESSION: 
 
Multiple levels stable foraminal narrowing, most marked on the right L4/L5 and 
moderately severe on the left L4/L5, bilaterally L3/L4. Multiple levels degenerative disc disease with multifactorial severe spinal 
stenosis L4/L5, similar and moderately severe stenosis L3/L4, mildly worsened. Multiple levels facet arthropathy as above. Hepatic cysts.    
  
  
  
PAST MEDICAL HISTORY Past Medical History:  
Diagnosis Date  Arthritis    
 Chronic pain    
  left hip and lower back  Hypertension    
 Left hip pain    
  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  
  brain, [address] Wears glasses    
  
  
     
Past Surgical History:  
Procedure Laterality Date  FOOT/TOES SURGERY PROC UNLISTED      
 HX HIP REPLACEMENT      
 HX OTHER SURGICAL   1975  
  brain surgery, Dr. Dion Bonner  TOTAL HIP ARTHROPLASTY Left 6/2015 .   
  
      
MEDICATIONS Current Outpatient Prescriptions Medication Sig Dispense Refill  traMADol (ULTRAM) 50 mg tablet Take 50 mg by mouth every six (6) hours as needed for Pain.      
 naproxen EC (NAPROSYN EC) 500 mg EC tablet Take 500 mg by mouth two (2) times daily (with meals).      
 amLODIPine-benazepril (LOTREL) 10-20 mg per capsule Take 1 Cap by Mouth Every Morning.      
 tamsulosin (FLOMAX) 0.4 mg capsule take 1 capsule by mouth once daily 90 Cap 0  
 gabapentin (NEURONTIN) 100 mg capsule 1 tab twice a day for routine and three times a day for flare ups of pain 90 Cap 5  pantoprazole (PROTONIX) 40 mg tablet Take 1 Tab by mouth Before breakfast and dinner. 60 Tab 0  
 sucralfate (CARAFATE) 100 mg/mL suspension Take 10 mL by mouth Before breakfast, lunch, dinner and at bedtime. 414 mL 0  
 acetaminophen (TYLENOL) 325 mg tablet Take 2 Tabs by mouth as needed for Fever. 50 Tab 0  
 amLODIPine (NORVASC) 10 mg tablet     0  
 cyclobenzaprine (FLEXERIL) 10 mg tablet take 1 tablet by mouth three times a day if needed for muscle spasm 60 Tab 1  
 ferrous sulfate 325 mg (65 mg iron) tablet take 1 tablet by mouth once daily   0  
 gabapentin (NEURONTIN) 100 mg capsule Take  by mouth three (3) times daily.      
  
  
ALLERGIES No Known Allergies    
  
SOCIAL HISTORY Social History  
  
     
Social History  Marital status:   
    Spouse name: N/A  
 Number of children: N/A  
 Years of education: N/A  
  
   
Occupational History  Not on file.  
  
      
Social History Main Topics  Smoking status: Former Smoker  
    Packs/day: 1.00  
    Years: 25.00  
    Quit date: 6/1/1990  Smokeless tobacco: Never Used  Alcohol use 0.5 oz/week   
    1 Shots of liquor per week  Drug use: No  
 Sexual activity: Not on file  
  
    
Other Topics Concern  Not on file  
  
     
Social History Narrative FAMILY HISTORY Family History Problem Relation Age of Onset  Family history unknown: Yes  
  
  
REVIEW OF SYSTEMS Review of Systems Constitutional: Negative for chills, fever and weight loss. Respiratory: Negative for shortness of breath. Cardiovascular: Negative for chest pain. Gastrointestinal: Negative for constipation. Negative for fecal incontinence Genitourinary: Negative for dysuria. Negative for urinary incontinence Musculoskeletal:  
     Per HPI Skin: Negative for rash. Neurological: Positive for tingling. Negative for dizziness, tremors, focal weakness and headaches. Endo/Heme/Allergies: Does not bruise/bleed easily. Psychiatric/Behavioral: The patient does not have insomnia.   
  
  
PHYSICAL EXAMINATION Visit Vitals  /64  Pulse 65  Resp 16  
 Ht 5' 11\" (1.803 m)  Wt 169 lb (76.7 kg)  BMI 23.57 kg/m2  
  
  
  
Accompanied by self. 
  
  
Constitutional:  Well developed, well nourished, in no acute distress. Psychiatric: Affect and mood are appropriate. Integumentary: No rashes or abrasions noted on exposed areas. Cardiovascular/Peripheral Vascular: Intact l pulses. No peripheral edema is noted. Lymphatic:  No evidence of lymphedema. No cervical lymphadenopathy. SPINE/MUSCULOSKELETAL EXAM 
  
  
Lumbar spine: No rash, ecchymosis, or gross obliquity. No fasciculations. No focal atrophy is noted. Tenderness to palpation midline L4-5. No tenderness to palpation at the sciatic notch. SI joints non-tender. Trochanters non tender.   
  
Sensation grossly intact to light touch. 
  
  
MOTOR:   
  
  Hip Flex  Quads Hamstrings Ankle DF EHL Ankle PF Right +4/5 +4/5 +4/5 +4/5 +4/5 +4/5 Left +4/5 +4/5 +4/5 +4/5 +4/5 +4/5  
  
Forward flexed 
  
Straight Leg raise negative. Ambulation without assistive device.  FWB. 
  
  
Written by Mary Zapata, as dictated by Elizabeth Bustamante MD. 
  
I, Dr. Elizabeth Bustamante MD, confirm that all documentation is accurate. 
  
  
 Mr. Bita Lee may have a reminder for a \"due or due soon\" health maintenance. I have asked that he contact his primary care provider for follow-up on this health maintenance. Note Details Instructions Return in about 1 month (around 9/2/2018). Patient Instructions   
   
   
Preventing Outdoor Falls: Care Instructions Your Care Instructions Worries about falls don't need to keep you indoors. Outdoor activities like walking have big benefits for your health. You will need to watch your step and learn a few safety measures. If you are worried about having a fall outdoors, ask your doctor about exercises, classes, or physical therapy that may help. You can learn ways to gain strength, flexibility, and balance. Ask if it might help to use a cane or walker. You can make your time outdoors safer with a few simple measures. Follow-up care is a key part of your treatment and safety.  Be sure to make and go to all appointments, and call your doctor if you are having problems. It's also a good idea to know your test results and keep a list of the medicines you take. How can you prevent falls outdoors? · Wear shoes with firm soles and low heels. If you have to walk on an icy surface, use grippers that can be worn over your shoes in bad weather. · Be extra careful if weather is bad. Walk on the grass when the sidewalks are slick. If you live in a place that gets snow and ice in the winter, sprinkle salt on slippery stairs and sidewalks. · Be careful getting on or off buses and trains or getting in and out of cars. If handrails are available, use them. · Be careful when you cross the street. Look for crosswalks or places where curb cuts or ramps are present. · Try not to hurry, especially if you are carrying something. · Be cautious in parking lots or garages. There may be curbs or changes in pavement, or the height of the pavement may vary. · Make sure to wear the correct eyeglasses, if you need them. Reading glasses or bifocals can make it harder to see hazards that might be in your way. · If you are walking outdoors for exercise, try to: 
¨ Walk in well-lighted, well-maintained areas. These include high school or college tracks, shopping malls, and public spaces. ¨ Walk with a partner. ¨ Watch out for cracked sidewalks, curbs, changes in the height of the pavement, exposed tree roots, and debris such as fallen leaves or branches. Where can you learn more? Go to http://em-gus.info/. Enter D192 in the search box to learn more about \"Preventing Outdoor Falls: Care Instructions. \" Current as of: May 12, 2017 Content Version: 11.7 © 2604-0172 [employer]. Care instructions adapted under license by [employer] (which disclaims liability or warranty for this information).  If you have questions about a medical condition or this instruction, always ask your healthcare professional. [employer] disclaims any warranty or liability for your use of this information. 
   
  
Additional Documentation Vitals:     /64 Pulse 65 Resp 16 Ht 5' 11\" (1.803 m) Wt 169 lb (76.7 kg) BMI 23.57 kg/m2 BSA 1.96 m2 Encounter Info:     Billing Info, History, Allergies, Detailed Report [employer] [facility] Summary sent to Janell Rizzo MD 
Sent 8/3/2018 Best[employer]ractice Advisories Click to view BestPractice Advisory history Orders Placed None Medication Changes None Medication List  
Visit Diagnoses Spinal stenosis of lumbar region with neurogenic claudication Problem List

## 2018-10-09 NOTE — INTERVAL H&P NOTE
H&P Update: 
Rimma Valiente was seen and briefly  examined. History and physical has been reviewed.   There have been no significant clinical changes since the completion of the originally dated History and Physical. 
 
Signed By: Madonna Esparza MD   
 October 9, 2018 10:35 AM

## 2018-10-09 NOTE — PERIOP NOTES
[address] October 9, 2018 RE: Dimas Celaya To Whom It May Concern, This is to certify that Dimas Celaya may may return to work on 10/11/2018. Please feel free to contact my office if you have any questions or concerns. Thank you for your assistance in this matter. Sincerely, 
Samantha Cummins, Rn, BSN Nurse to Dr. Ahmet Nation

## 2018-10-25 ENCOUNTER — OFFICE VISIT (OUTPATIENT)
Dept: ORTHOPEDIC SURGERY | Age: 83
End: 2018-10-25

## 2018-10-25 VITALS
TEMPERATURE: 97.5 F | DIASTOLIC BLOOD PRESSURE: 72 MMHG | BODY MASS INDEX: 22.6 KG/M2 | WEIGHT: 161.4 LBS | HEART RATE: 71 BPM | RESPIRATION RATE: 16 BRPM | HEIGHT: 71 IN | OXYGEN SATURATION: 98 % | SYSTOLIC BLOOD PRESSURE: 170 MMHG

## 2018-10-25 DIAGNOSIS — M48.062 SPINAL STENOSIS OF LUMBAR REGION WITH NEUROGENIC CLAUDICATION: Primary | ICD-10-CM

## 2018-10-25 RX ORDER — DULOXETIN HYDROCHLORIDE 30 MG/1
CAPSULE, DELAYED RELEASE ORAL
Qty: 30 CAP | Refills: 2 | Status: SHIPPED | OUTPATIENT
Start: 2018-10-25 | End: 2019-01-17 | Stop reason: SINTOL

## 2018-10-25 RX ORDER — IBUPROFEN 200 MG
TABLET ORAL
COMMUNITY
End: 2019-01-17

## 2018-10-25 NOTE — PATIENT INSTRUCTIONS
Lumbar Spinal Stenosis: Care Instructions Your Care Instructions Stenosis in the spine is a narrowing of the canal that is around the spinal cord and nerve roots in your back. It can happen as part of aging. Sometimes bone and other tissue grow into this canal and press on the nerves that branch out from the spinal cord. This can cause pain, numbness, and weakness. When it happens in the lower part of your back, it is called lumbar spinal stenosis. It can cause problems in the legs, feet, and rear end (buttocks). You may be able to get relief from the symptoms of spinal stenosis by taking pain medicine. Your doctor may suggest physical therapy and exercises to keep your spine strong and flexible. Some people try steroid shots to reduce swelling. If pain and numbness in your legs are still so bad that you cannot do your normal activities, you may need surgery. Follow-up care is a key part of your treatment and safety. Be sure to make and go to all appointments, and call your doctor if you are having problems. It's also a good idea to know your test results and keep a list of the medicines you take. How can you care for yourself at home? · Take an over-the-counter pain medicine. Nonsteroidal anti-inflammatory drugs (NSAIDs) such as ibuprofen or naproxen seem to work best. But if you can't take NSAIDs, you can try acetaminophen. Be safe with medicines. Read and follow all instructions on the label. · Do not take two or more pain medicines at the same time unless the doctor told you to. Many pain medicines have acetaminophen, which is Tylenol. Too much acetaminophen (Tylenol) can be harmful. · Stay at a healthy weight. Being overweight puts extra strain on your spine. · Change positions often when you sit or stand. This can ease pain. It may also reduce pressure on the spinal cord and its nerves. · Avoid doing things that make your symptoms worse.  Walking downhill and standing for a long time may cause pain. · Stretch and strengthen your back muscles as your doctor or physical therapist recommends. If your doctor says it is okay to do them, these exercises may help. ? Lie on your back with your knees bent. Gently pull one bent knee to your chest. Put that foot back on the floor, and then pull the other knee to your chest. 
? Do pelvic tilts. Lie on your back with your knees bent. Tighten your stomach muscles. Pull your belly button (navel) in and up toward your ribs. You should feel like your back is pressing to the floor and your hips and pelvis are slightly lifting off the floor. Hold for 6 seconds while breathing smoothly. ? Stand with your back flat against a wall. Slowly slide down until your knees are slightly bent. Hold for 10 seconds, then slide back up the wall. · Remove or change anything in your house that may cause you to fall. Keep walkways clear of clutter, electrical cords, and throw rugs. When should you call for help? Call 911 anytime you think you may need emergency care. For example, call if: 
  · You are unable to move a leg at all.  
[facility] your doctor now or seek immediate medical care if: 
  · You have new or worse symptoms in your legs, belly, or buttocks. Symptoms may include: 
? Numbness or tingling. ? Weakness. ? Pain.  
  · You lose bladder or bowel control.  
 Watch closely for changes in your health, and be sure to contact your doctor if: 
  · You have a fever, lose weight, or don't feel well.  
  · You are not getting better as expected. Where can you learn more? Go to http://em-gus.info/. Gian Nava in the search box to learn more about \"Lumbar Spinal Stenosis: Care Instructions. \" Current as of: November 29, 2017 Content Version: 11.8 © 3348-7947 [employer], [employer].  Care instructions adapted under license by [employer] (which disclaims liability or warranty for this information). If you have questions about a medical condition or this instruction, always ask your healthcare professional. [place] [address] any warranty or liability for your use of this information.

## 2018-10-25 NOTE — PROGRESS NOTES
[facility] [address] 
[address], [address] Phone: [phone] Fax: [phone] Krystal Jones : 1934 PCP: Omkar Nagel MD 
 
PROGRESS NOTE ASSESSMENT AND PLAN Diagnoses and all orders for this visit: 1. Spinal stenosis of lumbar region with neurogenic claudication Other orders -     DULoxetine (CYMBALTA) 30 mg capsule; Take 1 tab PO Q Dinner 1. Advised to continue HEP. 2. Discussed surgery as treatment options 3. Trial of Cymbalta 4. No indications for injections at this time. 5. Taper Gabapentin BID x2 days, qD x 2days, DC 
6. Given information on lumbar spinal stenosis Follow-up Disposition: 
Return in about 6 weeks (around 2018). HISTORY OF PRESENT ILLNESS Caroline Damon is a 80 y.o. male. Pt presents to the office for a f/u visit for back pain. Last OV Gabapentin 100mg increased to QID. Pt underwent L4-5 RICKY 10/9/18 with 8-10 day benefit. He states he has been taking Gabapentin TID with slight relief. He reports aggravated pain with standing and a short standing tolerance. Location of pain: low back, L side Does pain radiate into extremities: RLE numbness, tingling, cramps during day and night Does patient have weakness: no  
Pt denies saddle paresthesias. Medications pt is on: Naproxen 500mg BID with relief for side, not back. Gabapentin 100mg TID. Tramadol 50mg with minimal relief. Pt denies recent ED visits or hospitalizations. Denies persistent fevers, chills, weight changes, neurogenic bowel or bladder symptoms. Pt denies bloody urine or stools. Treatments patient has tried: 
Physical therapy:Yes Doing HEP: Unknown Non-opioid medications: Yes Spinal injections: Yes L4-5 RICKY 10/9/18 with 8-10 day benefit. Spinal surgery- No.  
 
 
 reviewed. Last fill of Tramadol . Hx of esophageal ulcer, R THR, L hip arthroplasty. Pt works at [employer]. Pain Assessment  10/25/2018 Location of Pain Back;Leg Location Modifiers Inferior;Right Severity of Pain -  
Quality of Pain Sharp; Other (Comment) Quality of Pain Comment Stabbing. N/T from back down right leg. Duration of Pain Persistent Frequency of Pain Constant Date Pain First Started - Aggravating Factors Walking Limiting Behavior -  
Relieving Factors Other (Comment) Relieving Factors Comment Walk with support Result of Injury -  
 
 
PAST MEDICAL HISTORY Past Medical History:  
Diagnosis Date  Arthritis  Chronic pain   
 left hip and lower back  Hypertension  Left hip pain   
 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  
 brain, [address] Wears glasses Past Surgical History:  
Procedure Laterality Date  FOOT/TOES SURGERY PROC UNLISTED  HX HIP REPLACEMENT    
 HX OTHER SURGICAL  1975  
 brain surgery, Dr. Celina Rivera  TOTAL HIP ARTHROPLASTY Left 6/2015 [place] MEDICATIONS Facility-Administered Medications Ordered in Other Visits Medication Dose Route Frequency Provider Last Rate Last Dose  lidocaine (PF) (XYLOCAINE) 10 mg/mL (1 %) injection    PRBRENNEN Meier MD   8 mL at 10/09/18 1046  
 iopamidol (ISOVUE-M 200) 41 % contrast solution    PRBRENNEN Meier MD   2 mL at 10/09/18 1047  
 dexamethasone (DECADRON) injection    PRBRENNEN Meier MD   30 mg at 10/09/18 1047 ALLERGIES No Known Allergies SOCIAL HISTORY Social History Socioeconomic History  Marital status:  Spouse name: Not on file  Number of children: Not on file  Years of education: Not on file  Highest education level: Not on file Social Needs  Financial resource strain: Not on file  Food insecurity - worry: Not on file  Food insecurity - inability: Not on file  Transportation needs - medical: Not on file  Transportation needs - non-medical: Not on file Occupational History  Not on file Tobacco Use  
  Smoking status: Former Smoker Packs/day: 1.00 Years: 25.00 Pack years: 25.00 Last attempt to quit: 1990 Years since quittin.4  Smokeless tobacco: Never Used Substance and Sexual Activity  Alcohol use: Yes Alcohol/week: 0.5 oz Types: 1 Shots of liquor per week  Drug use: No  
 Sexual activity: Not on file Other Topics Concern  Not on file Social History Narrative  Not on file FAMILY HISTORY Family History Family history unknown: Yes REVIEW OF SYSTEMS Review of Systems Constitutional: Negative for chills, fever and weight loss. Respiratory: Negative for shortness of breath. Cardiovascular: Negative for chest pain. Gastrointestinal: Negative for constipation. Negative for fecal incontinence Genitourinary: Negative for dysuria. Negative for urinary incontinence Musculoskeletal:  
     Per HPI Skin: Negative for rash. Neurological: Positive for tingling. Negative for dizziness, tremors, focal weakness and headaches. Endo/Heme/Allergies: Does not bruise/bleed easily. Psychiatric/Behavioral: The patient does not have insomnia. PHYSICAL EXAMINATION Visit Vitals /72 Pulse 71 Temp 97.5 °F (36.4 °C) Resp 16 Ht 5' 11\" (1.803 m) Wt 161 lb 6.4 oz (73.2 kg) SpO2 98% BMI 22.51 kg/m² Accompanied by self. Constitutional:  Well developed, well nourished, in no acute distress. Psychiatric: Affect and mood are appropriate. Integumentary: No rashes or abrasions noted on exposed areas. Cardiovascular/Peripheral Vascular: Intact l pulses. No peripheral edema is noted. Lymphatic:  No evidence of lymphedema. No cervical lymphadenopathy. SPINE/MUSCULOSKELETAL EXAM 
 
 
Lumbar spine: No rash, ecchymosis, or gross obliquity. No fasciculations. No focal atrophy is noted. Tenderness to palpation midline L4-5.  No tenderness to palpation at the sciatic notch. SI joints non-tender. Trochanters non tender. Sensation grossly intact to light touch. MOTOR:   
 
 Hip Flex  Quads Hamstrings Ankle DF EHL Ankle PF Right +4/5 +4/5 +4/5 +4/5 +4/5 +4/5 Left +4/5 +4/5 +4/5 +4/5 +4/5 +4/5 Straight Leg raise negative. Ambulation with walker. FWB. Written by Mary Zapata, as dictated by Elizabeth Bustamante MD. 
 
I, Dr. Elizabeth Bustamante MD, confirm that all documentation is accurate. Mr. Edu Peng may have a reminder for a \"due or due soon\" health maintenance. I have asked that he contact his primary care provider for follow-up on this health maintenance.

## 2018-11-08 DIAGNOSIS — M71.30 SYNOVIAL CYST: ICD-10-CM

## 2018-11-08 DIAGNOSIS — M48.062 SPINAL STENOSIS OF LUMBAR REGION WITH NEUROGENIC CLAUDICATION: Chronic | ICD-10-CM

## 2018-11-08 RX ORDER — GABAPENTIN 100 MG/1
CAPSULE ORAL
Qty: 90 CAP | Refills: 5 | Status: SHIPPED | OUTPATIENT
Start: 2018-11-08 | End: 2019-01-17

## 2018-11-30 ENCOUNTER — OFFICE VISIT (OUTPATIENT)
Dept: ORTHOPEDIC SURGERY | Age: 83
End: 2018-11-30

## 2018-11-30 VITALS
HEART RATE: 72 BPM | WEIGHT: 156 LBS | RESPIRATION RATE: 16 BRPM | OXYGEN SATURATION: 100 % | TEMPERATURE: 98.2 F | DIASTOLIC BLOOD PRESSURE: 72 MMHG | BODY MASS INDEX: 21.84 KG/M2 | SYSTOLIC BLOOD PRESSURE: 155 MMHG | HEIGHT: 71 IN

## 2018-11-30 DIAGNOSIS — M25.551 HIP PAIN, RIGHT: Primary | ICD-10-CM

## 2018-11-30 DIAGNOSIS — M70.61 TROCHANTERIC BURSITIS OF RIGHT HIP: ICD-10-CM

## 2018-11-30 RX ORDER — DICLOFENAC SODIUM 10 MG/G
GEL TOPICAL 4 TIMES DAILY
Qty: 100 G | Refills: 4 | Status: SHIPPED | OUTPATIENT
Start: 2018-11-30 | End: 2022-03-03 | Stop reason: SDUPTHER

## 2018-11-30 RX ORDER — BETAMETHASONE SODIUM PHOSPHATE AND BETAMETHASONE ACETATE 3; 3 MG/ML; MG/ML
6 INJECTION, SUSPENSION INTRA-ARTICULAR; INTRALESIONAL; INTRAMUSCULAR; SOFT TISSUE ONCE
Qty: 1 ML | Refills: 0 | Status: CANCELLED
Start: 2018-11-30 | End: 2018-11-30

## 2018-11-30 NOTE — PROGRESS NOTES
Patient: Johnie Pacheco                MRN: [id number]       SSN: [id number] YOB: 1934        AGE: 80 y.o. SEX: male Body mass index is 21.76 kg/m². PCP: Twin Torres MD 
11/30/18 HISTORY: I had the pleasure of reviewing María Stockton in followup. He is plagued with chronic back problems, stenosis, and arthritis. He has been having some off and on right hip pain. He did receive an injection for trochanteric bursitis about eight months ago, and he is having some discomfort recurring. He has had no fevers or chills and no groin pain, no thigh pain. He is very pleased overall with his hip replacements. The previous injection gave him about one months worth of relief. PHYSICAL EXAMINATION:  On examination today, he walks somewhat kyphotic and does use his cane for balance. The hips actually both rotate very nicely. The incisions are beautifully healed. It is not hot or red. He is very slim and slender. He has mild evidence of neuropathy distally with no foot drop. Both hips rotate well. He has good strength, and he is tender over the trochanter on the right side moderately. The left side is minimal.   
 
RADIOGRAPHS:  X-rays confirm the hip reconstruction has done very well. The bone graft is incorporated very, very nicely. The calf is nontender as well. He does have some evidence of heterotopic ossification on the lateral aspect of the trochanter. It is fortunately not bridging at all. IMPRESSION:  My overall impression is heterotopic ossification and subsequent recurrent trochanteric bursitis. PLAN:  I would not recommend surgery. It is more of a nuisance problem. It is not bad enough for an injection today. He does not want one. He would like to try with some Voltaren cream with the usual precautions. I have also offered some physical therapy for him, which he did not want to have.   Therefore, we will try with some Voltaren Cream.  He will return for followup in about four weeks time, and he may wish to have an injection at that point. We will reevaluate him. It has been a pleasure to share in his care. REVIEW OF SYSTEMS:   
 
CON: negative for weight loss, fever EYE: negative for double vision ENT: negative for hoarseness RS:   negative for Tb 
GI:    negative for blood in stool :  negative for blood in urine Other systems reviewed and noted below. Past Medical History:  
Diagnosis Date  Arthritis  Chronic pain   
 left hip and lower back  Hypertension  Left hip pain   
 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  
 brain, [address] Wears glasses Family History Family history unknown: Yes Current Outpatient Medications Medication Sig Dispense Refill  diclofenac (VOLTAREN) 1 % gel Apply  to affected area four (4) times daily. 100 g 4  
 DULoxetine (CYMBALTA) 30 mg capsule Take 1 tab PO Q Dinner 30 Cap 2  
 acetaminophen (TYLENOL) 325 mg tablet Take  by mouth every four (4) hours as needed for Pain.  tamsulosin (FLOMAX) 0.4 mg capsule take 1 capsule by mouth once daily 90 Cap 0  
 sucralfate (CARAFATE) 100 mg/mL suspension Take 10 mL by mouth Before breakfast, lunch, dinner and at bedtime. 414 mL 0  
 amLODIPine (NORVASC) 10 mg tablet   0  
 ferrous sulfate 325 mg (65 mg iron) tablet take 1 tablet by mouth once daily  0  
 gabapentin (NEURONTIN) 100 mg capsule take 1 capsule by mouth twice a day FOR ROUTINE AND TAKE THREE TIMES DAILY FOR FLARE UPS OF PAIN 90 Cap 5  ibuprofen (MOTRIN) 200 mg tablet Take  by mouth.  traMADol (ULTRAM) 50 mg tablet Take 50 mg by mouth every six (6) hours as needed for Pain.  naproxen EC (NAPROSYN EC) 500 mg EC tablet Take 500 mg by mouth two (2) times daily (with meals).  amLODIPine-benazepril (LOTREL) 10-20 mg per capsule Take 1 Cap by Mouth Every Morning.     
 pantoprazole (PROTONIX) 40 mg tablet Take 1 Tab by mouth Before breakfast and dinner. 60 Tab 0 No Known Allergies Past Surgical History:  
Procedure Laterality Date  FOOT/TOES SURGERY PROC UNLISTED  HX HIP REPLACEMENT    
 HX OTHER SURGICAL    
 brain surgery, Dr. Allie Miller  TOTAL HIP ARTHROPLASTY Left 2015 Social History Socioeconomic History  Marital status:  Spouse name: Not on file  Number of children: Not on file  Years of education: Not on file  Highest education level: Not on file Social Needs  Financial resource strain: Not on file  Food insecurity - worry: Not on file  Food insecurity - inability: Not on file  Transportation needs - medical: Not on file  Transportation needs - non-medical: Not on file Occupational History  Not on file Tobacco Use  Smoking status: Former Smoker Packs/day: 1.00 Years: 25.00 Pack years: 25.00 Last attempt to quit: 1990 Years since quittin.5  Smokeless tobacco: Never Used Substance and Sexual Activity  Alcohol use: Yes Alcohol/week: 0.5 oz Types: 1 Shots of liquor per week  Drug use: No  
 Sexual activity: Not on file Other Topics Concern  Not on file Social History Narrative  Not on file Visit Vitals /72 Pulse 72 Temp 98.2 °F (36.8 °C) Resp 16 Ht 5' 11\" (1.803 m) Wt 156 lb (70.8 kg) SpO2 100% BMI 21.76 kg/m² PHYSICAL EXAMINATION: 
GENERAL: Alert and oriented x3, in no acute distress, well-developed, well-nourished, afebrile. HEART: No JVD. EYES: No scleral icterus NECK: No significant lymphadenopathy LUNGS: No respiratory compromise or indrawing ABDOMEN: Soft, non-tender, non-distended. Electronically signed by:  Joseph Khalil MD

## 2018-12-06 ENCOUNTER — OFFICE VISIT (OUTPATIENT)
Dept: ORTHOPEDIC SURGERY | Age: 83
End: 2018-12-06

## 2018-12-06 VITALS
RESPIRATION RATE: 16 BRPM | BODY MASS INDEX: 21.87 KG/M2 | SYSTOLIC BLOOD PRESSURE: 155 MMHG | OXYGEN SATURATION: 100 % | DIASTOLIC BLOOD PRESSURE: 73 MMHG | TEMPERATURE: 97.6 F | HEIGHT: 71 IN | WEIGHT: 156.2 LBS | HEART RATE: 63 BPM

## 2018-12-06 DIAGNOSIS — M48.062 SPINAL STENOSIS OF LUMBAR REGION WITH NEUROGENIC CLAUDICATION: Primary | ICD-10-CM

## 2018-12-06 RX ORDER — PREGABALIN 50 MG/1
CAPSULE ORAL
Qty: 60 CAP | Refills: 2 | Status: SHIPPED | OUTPATIENT
Start: 2018-12-06 | End: 2019-01-17

## 2018-12-06 NOTE — PROGRESS NOTES
Verbal order entered per Dr. Debbie Richmond as documented on blue sheet:Lyrica 50mg take 1 tab po QHS x 1 week, then increase to BID. Disp 60 with 2 refills. Failed Gabapentin, Cymbalta

## 2018-12-06 NOTE — PROGRESS NOTES
[address] 
[address], [address] Phone: [phone] Fax: [phone] Leora Starkey : 1934 PCP: No primary care provider on file. PROGRESS NOTE ASSESSMENT AND PLAN Diagnoses and all orders for this visit: 1. Spinal stenosis of lumbar region with neurogenic claudication 1. Advised to continue HEP. 2. DC Cymbalta 3. Trial of Lyrica 4. Given information on sciatica Follow-up Disposition: 
Return in about 6 weeks (around 2019). HISTORY OF PRESENT ILLNESS Joselyn Devine is a 80 y.o. male. Pt presents to the office for a f/u visit for back pain. Pt was given trial of Cymbalta last OV. Pt states Cymbalta is not helping and he is now itchy. He reports aggravated pain with standing and a short standing and walking tolerance. Location of pain: low back Does pain radiate into extremities: RLE pain, numbness, tingling, cramps Does patient have weakness: no  
Pt denies saddle paresthesias. Medications pt is on: Cymbalta 30mg qD. Voltaren gel. Ibuprofen PRN. Tylenol PRN. Pt denies recent ED visits or hospitalizations. Denies persistent fevers, chills, weight changes, neurogenic bowel or bladder symptoms. Treatments patient has tried: 
Physical therapy:Yes Doing HEP: Unknown Non-opioid medications: Yes Failed Gabapentin, Naproxen, Tramadol, Cymbalta Spinal injections: Yes L4-5 RICKY 10/9/18 with 8-10 day benefit. Spinal surgery- No.  
 
 
 reviewed. Hx of esophageal ulcer, R THR, L hip arthroplasty. Pt works at [employer] 
 
Pain Assessment  2018 Location of Pain Back;Leg Location Modifiers Right Severity of Pain 8 Quality of Pain Aching; Sharp;Dull Quality of Pain Comment numbness, tingling Duration of Pain - Frequency of Pain Constant Date Pain First Started - Aggravating Factors Standing;Walking Limiting Behavior -  
Relieving Factors Rest  
 Relieving Factors Comment - Result of Injury -  
 
 
PAST MEDICAL HISTORY Past Medical History:  
Diagnosis Date  Arthritis  Chronic pain   
 left hip and lower back  Hypertension  Left hip pain   
 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  
 brain, [address] Wears glasses Past Surgical History:  
Procedure Laterality Date  FOOT/TOES SURGERY PROC UNLISTED  HX HIP REPLACEMENT    
 HX OTHER SURGICAL  1975  
 brain surgery, Dr. Daljit Whitney  TOTAL HIP ARTHROPLASTY Left 6/2015 [facility] MEDICATIONS Current Outpatient Medications Medication Sig Dispense Refill  diclofenac (VOLTAREN) 1 % gel Apply  to affected area four (4) times daily. 100 g 4  
 DULoxetine (CYMBALTA) 30 mg capsule Take 1 tab PO Q Dinner 30 Cap 2  
 acetaminophen (TYLENOL) 325 mg tablet Take  by mouth every four (4) hours as needed for Pain.  tamsulosin (FLOMAX) 0.4 mg capsule take 1 capsule by mouth once daily 90 Cap 0  
 amLODIPine-benazepril (LOTREL) 10-20 mg per capsule Take 1 Cap by Mouth Every Morning.  pantoprazole (PROTONIX) 40 mg tablet Take 1 Tab by mouth Before breakfast and dinner. 60 Tab 0  
 ferrous sulfate 325 mg (65 mg iron) tablet take 1 tablet by mouth once daily  0  
 gabapentin (NEURONTIN) 100 mg capsule take 1 capsule by mouth twice a day FOR ROUTINE AND TAKE THREE TIMES DAILY FOR FLARE UPS OF PAIN 90 Cap 5  ibuprofen (MOTRIN) 200 mg tablet Take  by mouth.  traMADol (ULTRAM) 50 mg tablet Take 50 mg by mouth every six (6) hours as needed for Pain.  naproxen EC (NAPROSYN EC) 500 mg EC tablet Take 500 mg by mouth two (2) times daily (with meals).  sucralfate (CARAFATE) 100 mg/mL suspension Take 10 mL by mouth Before breakfast, lunch, dinner and at bedtime. 414 mL 0  
 amLODIPine (NORVASC) 10 mg tablet   0 ALLERGIES No Known Allergies SOCIAL HISTORY Social History Socioeconomic History  Marital status:   
 Spouse name: Not on file  Number of children: Not on file  Years of education: Not on file  Highest education level: Not on file Social Needs  Financial resource strain: Not on file  Food insecurity - worry: Not on file  Food insecurity - inability: Not on file  Transportation needs - medical: Not on file  Transportation needs - non-medical: Not on file Occupational History  Not on file Tobacco Use  Smoking status: Former Smoker Packs/day: 1.00 Years: 25.00 Pack years: 25.00 Last attempt to quit: 1990 Years since quittin.5  Smokeless tobacco: Never Used Substance and Sexual Activity  Alcohol use: Yes Alcohol/week: 0.5 oz Types: 1 Shots of liquor per week  Drug use: No  
 Sexual activity: Not on file Other Topics Concern  Not on file Social History Narrative  Not on file FAMILY HISTORY Family History Family history unknown: Yes REVIEW OF SYSTEMS Review of Systems Constitutional: Negative for chills, fever and weight loss. Respiratory: Negative for shortness of breath. Cardiovascular: Negative for chest pain. Gastrointestinal: Negative for constipation. Negative for fecal incontinence Genitourinary: Negative for dysuria. Negative for urinary incontinence Musculoskeletal:  
     Per HPI Skin: Negative for rash. Neurological: Positive for tingling. Negative for dizziness, tremors, focal weakness and headaches. Endo/Heme/Allergies: Does not bruise/bleed easily. Psychiatric/Behavioral: The patient does not have insomnia. PHYSICAL EXAMINATION Visit Vitals /73 Pulse 63 Temp 97.6 °F (36.4 °C) Resp 16 Ht 5' 11\" (1.803 m) Wt 156 lb 3.2 oz (70.9 kg) SpO2 100% BMI 21.79 kg/m² Accompanied by self. Constitutional:  Well developed, well nourished, in no acute distress. Psychiatric: Affect and mood are appropriate. Integumentary: No rashes or abrasions noted on exposed areas. Cardiovascular/Peripheral Vascular: Intact l pulses. No peripheral edema is noted. Lymphatic:  No evidence of lymphedema. No cervical lymphadenopathy. SPINE/MUSCULOSKELETAL EXAM 
 
Lumbar spine: No rash, ecchymosis, or gross obliquity. No fasciculations. No focal atrophy is noted. Tenderness to palpation midline L5-S1. No tenderness to palpation at the sciatic notch. SI joints non-tender. Trochanters non tender. Sensation grossly intact to light touch. MOTOR:   
 
 Hip Flex  Quads Hamstrings Ankle DF EHL Ankle PF Right +4/5 +4/5 +4/5 +4/5 +4/5 +4/5 Left +4/5 +4/5 +4/5 +4/5 +4/5 +4/5 Straight Leg raise negative. Ambulation with single point cane with mild shuffling. FWB. FF. Written by Federica Manrique, as dictated by Berenice Thorpe MD. 
 
I, Dr. Berenice Thorpe MD, confirm that all documentation is accurate. Mr. Girish Rossi may have a reminder for a \"due or due soon\" health maintenance. I have asked that he contact his primary care provider for follow-up on this health maintenance.

## 2018-12-06 NOTE — PATIENT INSTRUCTIONS
Sciatica: Care Instructions Your Care Instructions Sciatica (say \"foq-SD-qw-kuh\") is an irritation of one of the sciatic nerves, which come from the spinal cord in the lower back. The sciatic nerves and their branches extend down through the buttock to the foot. Sciatica can develop when an injured disc in the back presses against a spinal nerve root. Its main symptom is pain, numbness, or weakness that is often worse in the leg or foot than in the back. Sciatica often will improve and go away with time. Early treatment usually includes medicines and exercises to relieve pain. Follow-up care is a key part of your treatment and safety. Be sure to make and go to all appointments, and call your doctor if you are having problems. It's also a good idea to know your test results and keep a list of the medicines you take. How can you care for yourself at home? · Take pain medicines exactly as directed. ? If the doctor gave you a prescription medicine for pain, take it as prescribed. ? If you are not taking a prescription pain medicine, ask your doctor if you can take an over-the-counter medicine. · Use heat or ice to relieve pain. ? To apply heat, put a warm water bottle, heating pad set on low, or warm cloth on your back. Do not go to sleep with a heating pad on your skin. ? To use ice, put ice or a cold pack on the area for 10 to 20 minutes at a time. Put a thin cloth between the ice and your skin. · Avoid sitting if possible, unless it feels better than standing. · Alternate lying down with short walks. Increase your walking distance as you are able to without making your symptoms worse. · Do not do anything that makes your symptoms worse. When should you call for help? Call 911 anytime you think you may need emergency care. For example, call if: 
  · You are unable to move a leg at all.  
[facility] your doctor now or seek immediate medical care if:   · You have new or worse symptoms in your legs or buttocks. Symptoms may include: 
? Numbness or tingling. ? Weakness. ? Pain.  
  · You lose bladder or bowel control.  
 Watch closely for changes in your health, and be sure to contact your doctor if: 
  · You are not getting better as expected. Where can you learn more? Go to http://em-gus.info/. Enter 237-062-0560 in the search box to learn more about \"Sciatica: Care Instructions. \" Current as of: November 29, 2017 Content Version: 11.8 © 3868-4390 [employer]. Care instructions adapted under license by [employer] (which disclaims liability or warranty for this information). If you have questions about a medical condition or this instruction, always ask your healthcare professional. [address] any warranty or liability for your use of this information.

## 2019-01-17 ENCOUNTER — OFFICE VISIT (OUTPATIENT)
Dept: ORTHOPEDIC SURGERY | Age: 84
End: 2019-01-17

## 2019-01-17 VITALS
TEMPERATURE: 98.1 F | RESPIRATION RATE: 16 BRPM | DIASTOLIC BLOOD PRESSURE: 75 MMHG | SYSTOLIC BLOOD PRESSURE: 173 MMHG | HEART RATE: 64 BPM | WEIGHT: 163 LBS | HEIGHT: 71 IN | BODY MASS INDEX: 22.82 KG/M2 | OXYGEN SATURATION: 100 %

## 2019-01-17 DIAGNOSIS — M48.062 SPINAL STENOSIS OF LUMBAR REGION WITH NEUROGENIC CLAUDICATION: Primary | ICD-10-CM

## 2019-01-17 RX ORDER — PREGABALIN 75 MG/1
CAPSULE ORAL
Qty: 60 CAP | Refills: 5 | Status: SHIPPED | OUTPATIENT
Start: 2019-01-17 | End: 2019-08-15

## 2019-01-17 NOTE — PATIENT INSTRUCTIONS
Hamstring Strain: Rehab Exercises Your Care Instructions Here are some examples of typical rehabilitation exercises for your condition. Start each exercise slowly. Ease off the exercise if you start to have pain. Your doctor or physical therapist will tell you when you can start these exercises and which ones will work best for you. How to do the exercises Hamstring set (heel dig) 1. Sit with your affected leg bent. Your good leg should be straight and supported on the floor. 2. Tighten the muscles on the back of your bent leg (hamstring) by pressing your heel into the floor. 3. Hold for about 6 seconds, and then rest for up to 10 seconds. 4. Repeat 8 to 12 times. Hamstring curl 1. Lie on your stomach with your knees straight. Place a pillow under your stomach. If your kneecap is uncomfortable, roll up a washcloth and put it under your leg just above your kneecap. 2. Lift the foot of your affected leg by bending your knee so that you bring your foot up toward your buttock. If this motion hurts, try it without bending your knee quite as far. This may help you avoid any painful motion. 3. Slowly move your leg up and down. 4. Repeat 8 to 12 times. 5. When you can do this exercise with ease and no pain, add some resistance. To do this: 
6. Tie the ends of an exercise band together to form a loop. Attach one end of the loop to a secure object or shut a door on it to hold it in place. (Or you can have someone hold one end of the loop to provide resistance.) 7. Loop the other end of the exercise band around the lower part of your affected leg. 8. Repeat steps 1 through 4, slowly pulling back on the exercise band with your leg. Hip extension 1. Stand facing a wall with your hands on the wall at about chest level. 2. Keeping the knee of your affected leg straight, kick that leg straight back behind you. 3. Relax, and lower your leg back to the starting position. 4. Repeat 8 to 12 times. 5. When you can do this exercise with ease and no pain, add some resistance. To do this: 
6. Tie the ends of an exercise band together to form a loop. Attach one end of the loop to a secure object or shut a door on it to hold it in place. (Or you can have someone hold one end of the loop to provide resistance.) 7. Loop the other end of the exercise band around the lower part of your affected leg. 8. Repeat steps 1 through 4, slowly pulling back on the exercise band with your leg. Hamstring wall stretch 1. Lie on your back in a doorway, with your good leg through the open door. 2. Slide your affected leg up the wall to straighten your knee. You should feel a gentle stretch down the back of your leg. 1. Do not arch your back. 2. Do not bend either knee. 3. Keep one heel touching the floor and the other heel touching the wall. Do not point your toes. 3. Hold the stretch for at least 1 minute to begin. Then try to lengthen the time you hold the stretch to as long as 6 minutes. 4. Repeat 2 to 4 times. 5. If you do not have a place to do this exercise in a doorway, there is another way to do it: 6. Lie on your back, and bend the knee of your affected leg. 7. Loop a towel under the ball and toes of that foot, and hold the ends of the towel in your hands. 8. Straighten your knee, and slowly pull back on the towel. You should feel a gentle stretch down the back of your leg. 9. Hold the stretch for 15 to 30 seconds. Or even better, hold the stretch for 1 minute if you can. 10. Repeat 2 to 4 times. Calf stretch 1. Stand facing a wall with your hands on the wall at about eye level. Put your affected leg about a step behind your other leg. 2. Keeping your back leg straight and your back heel on the floor, bend your front knee and gently bring your hip and chest toward the wall until you feel a stretch in the calf of your back leg. 3. Hold the stretch for 15 to 30 seconds. 4. Repeat 2 to 4 times. 5. Repeat steps 1 through 4, but this time keep your back knee bent. Single-leg balance 1. Stand on a flat surface with your arms stretched out to your sides like you are making the letter \"T. \" Then lift your good leg off the floor, bending it at the knee. If you are not steady on your feet, use one hand to hold on to a chair, counter, or wall. 2. Standing on your affected leg, keep that knee straight. Try to balance on that leg for up to 30 seconds. Then rest for up to 10 seconds. 3. Repeat 6 to 8 times. 4. When you can balance on your affected leg for 30 seconds with your eyes open, try to balance on it with your eyes closed. 5. When you can do this exercise with your eyes closed for 30 seconds and with ease and no pain, try standing on a pillow or piece of foam, and repeat steps 1 through 4. Follow-up care is a key part of your treatment and safety. Be sure to make and go to all appointments, and call your doctor if you are having problems. It's also a good idea to know your test results and keep a list of the medicines you take. Where can you learn more? Go to http://em-gus.info/. Enter 483 [id number] [id number] in the search box to learn more about \"Hamstring Strain: Rehab Exercises. \" Current as of: November 29, 2017 Content Version: 11.8 © 5280-6553 [employer]. Care instructions adapted under license by [employer] (which disclaims liability or warranty for this information). If you have questions about a medical condition or this instruction, always ask your healthcare professional. [place] [address] any warranty or liability for your use of this information.

## 2019-01-17 NOTE — PROGRESS NOTES
[facility] 2. 
[address], [address] [place], [address] [place] [address] Street Phone: [phone] Fax: [phone] Linda Wilkins : 1934 PCP: Jose Jeronimo MD 
 
PROGRESS NOTE ASSESSMENT AND PLAN Diagnoses and all orders for this visit: 1. Spinal stenosis of lumbar region with neurogenic claudication -     pregabalin (LYRICA) 75 mg capsule; Take 1 tab po BID. Failed Gabapentin, Cymbalta 1. Advised to continue HEP. Do ankle pumps. 2. Increase Lyrica to 75mg BID 3. No indications for surgery or spinal injections at this time. 4. Given information on hamstring stretches Follow-up Disposition: 
Return in about 6 months (around 2019). HISTORY OF PRESENT ILLNESS Galen Mix is a 80 y.o. male. Pt presents to the office for a f/u visit for stenosis. Last OV given trial of Lyrica. Pt reports doing well with Lyrica, he denies blurred vision or swelling. Pt c/o tingling pins and needles in low RLE, some better. Location of pain: low back Does pain radiate into extremities: RLE pain and tingling. R knee pain Does patient have weakness: RLE drags sometimes Pt denies saddle paresthesias. Medications pt is on: Voltaren gel some benefit on RLE. Lyrica 50mg BID. Tylenol PRN. Pt denies recent ED visits or hospitalizations. Denies persistent fevers, chills, weight changes, neurogenic bowel or bladder symptoms. Treatments patient has tried: 
Physical therapy:Yes Doing HEP: Yes Non-opioid medications: Yes Failed Cymbalta, Gabapentin, Naproxen, tramadol Spinal injections: Yes  L4-5 RICKY 10/9/18 with 8-10 day benefit. Spinal surgery- No.  
Last L MRI 2018: multilevel DDD, mod stenosis L3-4. Foraminal narrowing L4-5, L3-4. Facet arthropathy.  reviewed. Hx of esophageal ulcer, R THR, L hip arthroplasty, R hip replacment. Pt works at [employer] 
 
Pain Assessment  2019 Location of Pain Back Location Modifiers Inferior Severity of Pain 8 Quality of Pain Sharp; Other (Comment) Quality of Pain Comment Shooting. Numbness right leg. Duration of Pain Persistent Frequency of Pain Constant Date Pain First Started - Aggravating Factors Walking Limiting Behavior Some Relieving Factors Other (Comment) Relieving Factors Comment Sitting Result of Injury No  
 
 
PAST MEDICAL HISTORY Past Medical History:  
Diagnosis Date  Arthritis  Chronic pain   
 left hip and lower back  Hypertension  Left hip pain   
 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  
 brain, [address] Wears glasses Past Surgical History:  
Procedure Laterality Date  FOOT/TOES SURGERY PROC UNLISTED  HX HIP REPLACEMENT    
 HX OTHER SURGICAL  1975  
 brain surgery, Dr. Praveen Lopez  TOTAL HIP ARTHROPLASTY Left 6/2015 Carlos Ramos MEDICATIONS Current Outpatient Medications Medication Sig Dispense Refill  pregabalin (LYRICA) 75 mg capsule Take 1 tab po BID. Failed Gabapentin, Cymbalta 60 Cap 5  
 diclofenac (VOLTAREN) 1 % gel Apply  to affected area four (4) times daily. 100 g 4  
 acetaminophen (TYLENOL) 325 mg tablet Take  by mouth every four (4) hours as needed for Pain.  tamsulosin (FLOMAX) 0.4 mg capsule take 1 capsule by mouth once daily 90 Cap 0  
 amLODIPine-benazepril (LOTREL) 10-20 mg per capsule Take 1 Cap by Mouth Every Morning.  amLODIPine (NORVASC) 10 mg tablet   0  
 ferrous sulfate 325 mg (65 mg iron) tablet take 1 tablet by mouth once daily  0 ALLERGIES No Known Allergies SOCIAL HISTORY Social History Socioeconomic History  Marital status:  Spouse name: Not on file  Number of children: Not on file  Years of education: Not on file  Highest education level: Not on file Social Needs  Financial resource strain: Not on file  Food insecurity - worry: Not on file  Food insecurity - inability: Not on file  Transportation needs - medical: Not on file  Transportation needs - non-medical: Not on file Occupational History  Not on file Tobacco Use  Smoking status: Former Smoker Packs/day: 1.00 Years: 25.00 Pack years: 25.00 Last attempt to quit: 1990 Years since quittin.6  Smokeless tobacco: Never Used Substance and Sexual Activity  Alcohol use: Yes Alcohol/week: 0.5 oz Types: 1 Shots of liquor per week  Drug use: No  
 Sexual activity: Not on file Other Topics Concern  Not on file Social History Narrative  Not on file FAMILY HISTORY Family History Family history unknown: Yes REVIEW OF SYSTEMS Review of Systems Constitutional: Negative for chills, fever and weight loss. Respiratory: Negative for shortness of breath. Cardiovascular: Negative for chest pain. Gastrointestinal: Negative for constipation. Negative for fecal incontinence Genitourinary: Negative for dysuria. Negative for urinary incontinence Musculoskeletal:  
     Per HPI Skin: Negative for rash. Neurological: Positive for tingling. Negative for dizziness, tremors, focal weakness and headaches. Endo/Heme/Allergies: Does not bruise/bleed easily. Psychiatric/Behavioral: The patient does not have insomnia. PHYSICAL EXAMINATION Visit Vitals /75 Pulse 64 Temp 98.1 °F (36.7 °C) Resp 16 Ht 5' 11\" (1.803 m) Wt 163 lb (73.9 kg) SpO2 100% BMI 22.73 kg/m² Accompanied by self. Constitutional:  Well developed, well nourished, in no acute distress. Psychiatric: Affect and mood are appropriate. Integumentary: No rashes or abrasions noted on exposed areas. Cardiovascular/Peripheral Vascular: Intact l pulses. No peripheral edema is noted. Lymphatic:  No evidence of lymphedema. No cervical lymphadenopathy. SPINE/MUSCULOSKELETAL EXAM 
 
 
Lumbar spine: No rash, ecchymosis, or gross obliquity. No fasciculations. No focal atrophy is noted. Tenderness to palpation midline L5-S1. No tenderness to palpation at the sciatic notch. SI joints non-tender. Trochanters non tender. Diminished Sensation to light touch R lateral calf. MOTOR:   
 
 Hip Flex  Quads Hamstrings Ankle DF EHL Ankle PF Right +4/5 +4/5 +4/5 +4/5 +4/5 +4/5 Left +4/5 +4/5 +4/5 +4/5 +4/5 +4/5 Straight Leg raise negative. Ambulation with walker. Mild scissoring of gait. FWB. Written by Susan Brown, as dictated by Weyman Mohs, MD. 
 
I, Dr. Weyman Mohs, MD, confirm that all documentation is accurate. Mr. Valerie Choi may have a reminder for a \"due or due soon\" health maintenance. I have asked that he contact his primary care provider for follow-up on this health maintenance.

## 2019-02-14 ENCOUNTER — HOSPITAL ENCOUNTER (OUTPATIENT)
Dept: LAB | Age: 84
Discharge: HOME | End: 2019-02-14

## 2019-02-14 ENCOUNTER — OFFICE VISIT (OUTPATIENT)
Dept: FAMILY MEDICINE CLINIC | Age: 84
End: 2019-02-14

## 2019-02-14 VITALS
SYSTOLIC BLOOD PRESSURE: 144 MMHG | BODY MASS INDEX: 22.96 KG/M2 | OXYGEN SATURATION: 97 % | HEIGHT: 71 IN | RESPIRATION RATE: 16 BRPM | WEIGHT: 164 LBS | TEMPERATURE: 98 F | DIASTOLIC BLOOD PRESSURE: 76 MMHG | HEART RATE: 69 BPM

## 2019-02-14 DIAGNOSIS — R73.01 IFG (IMPAIRED FASTING GLUCOSE): ICD-10-CM

## 2019-02-14 DIAGNOSIS — N40.0 BENIGN PROSTATIC HYPERPLASIA, UNSPECIFIED WHETHER LOWER URINARY TRACT SYMPTOMS PRESENT: ICD-10-CM

## 2019-02-14 DIAGNOSIS — D64.9 ANEMIA, UNSPECIFIED TYPE: ICD-10-CM

## 2019-02-14 DIAGNOSIS — I10 ESSENTIAL HYPERTENSION: Primary | ICD-10-CM

## 2019-02-14 LAB — XX-LABCORP SPECIMEN COL,LCBCF: NORMAL

## 2019-02-14 PROCEDURE — 99001 SPECIMEN HANDLING PT-LAB: CPT

## 2019-02-14 RX ORDER — AMLODIPINE BESYLATE 10 MG/1
10 TABLET ORAL DAILY
COMMUNITY
End: 2019-09-19

## 2019-02-14 NOTE — PROGRESS NOTES
1. Have you been to the ER, urgent care clinic since your last visit? Hospitalized since your last visit? Has never been seen here 2. Have you seen or consulted any other health care providers outside of the [address] [place] [address] since your last visit? Include any pap smears or colon screening.  No

## 2019-02-14 NOTE — PROGRESS NOTES
SUBJECTIVE Chief Complaint Patient presents with  New Patient  Back Pain  Hip Pain  
  c/o left hip pain, along with sharp shooting going down to the thigh area (fall on Thursday 02-)  Fall Thursday 02- Patient presents to establish care. He has hypertension. We reviewed their cardiac risk factors. He is pretty active until his fall. He works at [employer] for 11 years and walks a lot. The patient has been taking medications as prescribed regularly and denies any side effects with the medication. The patient denies any chest pain or chest tightness. Denies any dyspnea upon exertion. The patient denies any headaches, blurred vision, or double vision. Rachela Larve onto left knee and he says it jammed his femur upwards. He has had a hip arthroplasty on that side. He says he has had thigh and hip pain since the fall. He is scheduled with orthopedics tomorrow. The fall was 1 week ago. The pain is not getting worse. He is using a walker to ambulate. HE has chronic pain from arthritis and is on Lyrica. He tried Advil 2 tabs twice so far with minimal relief. Past Medical History:  
Diagnosis Date  Arthritis  Chronic pain   
 left hip and lower back  Hypertension  Left hip pain   
 resolved since surgery  [address]romboembolus ([facility] [facility] 1975  
 brain, [address] Wears glasses Current Outpatient Medications:  
  amLODIPine (NORVASC) 10 mg tablet, Take  by mouth daily. , Disp: , Rfl:  
  pregabalin (LYRICA) 75 mg capsule, Take 1 tab po BID. Failed Gabapentin, Cymbalta, Disp: 60 Cap, Rfl: 5 
  diclofenac (VOLTAREN) 1 % gel, Apply  to affected area four (4) times daily. , Disp: 100 g, Rfl: 4 
  tamsulosin (FLOMAX) 0.4 mg capsule, take 1 capsule by mouth once daily, Disp: 90 Cap, Rfl: 0 
  ferrous sulfate 325 mg (65 mg iron) tablet, take 1 tablet by mouth once daily, Disp: , Rfl: 0 
  acetaminophen (TYLENOL) 325 mg tablet, Take  by mouth every four (4) hours as needed for Pain., Disp: , Rfl:  
 
No Known Allergies Past Surgical History:  
Procedure Laterality Date  FOOT/TOES SURGERY PROC UNLISTED  HX HIP REPLACEMENT Right  HX OTHER SURGICAL  1975  
 brain surgery - says he hit his head and then had headaches and had to have surgery,  Dr. Javon Childers  TOTAL HIP ARTHROPLASTY Left 6/2015 Family History Problem Relation Age of Onset  Cancer Mother  Heart Attack Daughter Social History Socioeconomic History  Marital status:  Spouse name: Not on file  Number of children: Not on file  Years of education: Not on file  Highest education level: Not on file Social Needs  Financial resource strain: Not on file  Food insecurity - worry: Not on file  Food insecurity - inability: Not on file  Transportation needs - medical: Not on file  Transportation needs - non-medical: Not on file Occupational History  Not on file Tobacco Use  Smoking status: Never Smoker  Smokeless tobacco: Never Used Substance and Sexual Activity  Alcohol use: No  
  Frequency: Never  Drug use: No  
 Sexual activity: Not Currently Partners: Female Other Topics Concern  Not on file Social History Narrative  Not on file ROS: 
Complete 10 system ROS is obtained from the patient intake forms which are reviewed with the patient. The intake H&P is scanned in for the chart. OBJECTIVE Blood pressure 144/76, pulse 69, temperature 98 °F (36.7 °C), temperature source Oral, resp. rate 16, height 5' 11\" (1.803 m), weight 164 lb (74.4 kg), SpO2 97 %. General:  Alert, cooperative, well appearing, in no apparent distress. Eyes:  Pupils are equally round and reactive to light with accommodation. The extra-ocular movements are intact. Neck:  There are no carotid bruits. No JVD. CV:  The heart sounds are regular in rate and rhythm. There is a normal S1 and S2. There or no murmurs. Lungs:  Inspiratory and expiratory efforts are full and unlabored. Lung sounds are clear and equal to auscultation throughout all lung fields without wheezing, rales, or rhonchi. Abd: soft, nontender, nondistended, no masses. Ext: no swelling. Psych: normal affect. Mood good. Oriented x 3. Judgement and insight intact. ASSESSMENT / PLAN 
  ICD-10-CM ICD-9-CM 1. Essential hypertension I10 401.9 CBC W/O DIFF  
   METABOLIC PANEL, COMPREHENSIVE  
   LIPID PANEL  
2. IFG (impaired fasting glucose) R73.01 790.21 HEMOGLOBIN A1C W/O EAG 3. Benign prostatic hyperplasia, unspecified whether lower urinary tract symptoms present N40.0 600.00 CBC W/O DIFF  
   METABOLIC PANEL, COMPREHENSIVE 4. Anemia, unspecified type D64.9 285.9 FERRITIN  
 
HTN - cont current care. Refills as needed. Fasting labs ordered. IFG - check A1c. Last checked a few years ago was 6%. BPH - cont flomax. No need for PSA testing due to age. Anemia - cont iron. Check ferritin, CBC, and vit B12. He will see the orthopedist tomorrow. Since it has been 1 week, he is ambulating with a walker, and it is not worsening, I will defer to ortho for films, etc. 
 
All chart history elements were reviewed by me at the time of the visit even though marked at time of note closure. Patient understands our medical plan. Patient has provided input and agrees with goals. Alternatives have been explained and offered. All questions answered. The patient is to call if condition worsens or fails to improve. Follow-up Disposition: 
Return in about 6 months (around 8/14/2019) for  annual medicare wellness/routine care and please have 10 hour fasting on Friday 02-.

## 2019-02-15 ENCOUNTER — OFFICE VISIT (OUTPATIENT)
Dept: ORTHOPEDIC SURGERY | Age: 84
End: 2019-02-15

## 2019-02-15 VITALS
SYSTOLIC BLOOD PRESSURE: 148 MMHG | BODY MASS INDEX: 22.96 KG/M2 | DIASTOLIC BLOOD PRESSURE: 61 MMHG | TEMPERATURE: 98.1 F | OXYGEN SATURATION: 100 % | RESPIRATION RATE: 11 BRPM | HEIGHT: 71 IN | HEART RATE: 71 BPM | WEIGHT: 164 LBS

## 2019-02-15 DIAGNOSIS — G89.18 ACUTE POSTOPERATIVE PAIN OF LEFT HIP: ICD-10-CM

## 2019-02-15 DIAGNOSIS — Z96.642 HISTORY OF TOTAL LEFT HIP REPLACEMENT: ICD-10-CM

## 2019-02-15 DIAGNOSIS — M25.552 ACUTE POSTOPERATIVE PAIN OF LEFT HIP: ICD-10-CM

## 2019-02-15 DIAGNOSIS — M70.62 TROCHANTERIC BURSITIS OF LEFT HIP: ICD-10-CM

## 2019-02-15 DIAGNOSIS — M54.50 LUMBAR PAIN: Primary | ICD-10-CM

## 2019-02-15 DIAGNOSIS — M89.8X5 PAIN OF LEFT FEMUR: ICD-10-CM

## 2019-02-15 LAB
ALBUMIN SERPL-MCNC: 4.2 G/DL (ref 3.5–4.7)
ALBUMIN/GLOB SERPL: 1.4 {RATIO} (ref 1.2–2.2)
ALP SERPL-CCNC: 81 IU/L (ref 39–117)
ALT SERPL-CCNC: 18 IU/L (ref 0–44)
AST SERPL-CCNC: 21 IU/L (ref 0–40)
BILIRUB SERPL-MCNC: 0.3 MG/DL (ref 0–1.2)
BUN SERPL-MCNC: 17 MG/DL (ref 8–27)
BUN/CREAT SERPL: 14 (ref 10–24)
CALCIUM SERPL-MCNC: 9.3 MG/DL (ref 8.6–10.2)
CHLORIDE SERPL-SCNC: 105 MMOL/L (ref 96–106)
CHOLEST SERPL-MCNC: 170 MG/DL (ref 100–199)
CO2 SERPL-SCNC: 22 MMOL/L (ref 20–29)
CREAT SERPL-MCNC: 1.18 MG/DL (ref 0.76–1.27)
ERYTHROCYTE [DISTWIDTH] IN BLOOD BY AUTOMATED COUNT: 13.6 % (ref 12.3–15.4)
FERRITIN SERPL-MCNC: 57 NG/ML (ref 30–400)
GLOBULIN SER CALC-MCNC: 3 G/DL (ref 1.5–4.5)
GLUCOSE SERPL-MCNC: 79 MG/DL (ref 65–99)
HBA1C MFR BLD: 5.4 % (ref 4.8–5.6)
HCT VFR BLD AUTO: 37.6 % (ref 37.5–51)
HDLC SERPL-MCNC: 65 MG/DL
HGB BLD-MCNC: 12.3 G/DL (ref 13–17.7)
INTERPRETATION, 910389: NORMAL
INTERPRETATION: NORMAL
LDLC SERPL CALC-MCNC: 92 MG/DL (ref 0–99)
MCH RBC QN AUTO: 30.6 PG (ref 26.6–33)
MCHC RBC AUTO-ENTMCNC: 32.7 G/DL (ref 31.5–35.7)
MCV RBC AUTO: 94 FL (ref 79–97)
PDF IMAGE, 910387: NORMAL
PLATELET # BLD AUTO: 280 X10E3/UL (ref 150–379)
POTASSIUM SERPL-SCNC: 4.4 MMOL/L (ref 3.5–5.2)
PROT SERPL-MCNC: 7.2 G/DL (ref 6–8.5)
RBC # BLD AUTO: 4.02 X10E6/UL (ref 4.14–5.8)
SODIUM SERPL-SCNC: 142 MMOL/L (ref 134–144)
TRIGL SERPL-MCNC: 66 MG/DL (ref 0–149)
VLDLC SERPL CALC-MCNC: 13 MG/DL (ref 5–40)
WBC # BLD AUTO: 6.1 X10E3/UL (ref 3.4–10.8)

## 2019-02-15 RX ORDER — BETAMETHASONE SODIUM PHOSPHATE AND BETAMETHASONE ACETATE 3; 3 MG/ML; MG/ML
6 INJECTION, SUSPENSION INTRA-ARTICULAR; INTRALESIONAL; INTRAMUSCULAR; SOFT TISSUE ONCE
Qty: 1 ML | Refills: 0 | Status: CANCELLED
Start: 2019-02-15 | End: 2019-02-15

## 2019-02-15 NOTE — PROGRESS NOTES
1. Have you been to the ER, urgent care clinic since your last visit? Hospitalized since your last visit? No    2. Have you seen or consulted any other health care providers outside of the [address] [place] [address] since your last visit? Include any pap smears or colon screening.  No

## 2019-02-15 NOTE — PROGRESS NOTES
[address], [address] [place] [address]  [phone]           Patient: Oni Marrufo                MRN: [id number]       SSN: [id number]  YOB: 1934        AGE: 80 y.o. SEX: male  Body mass index is 22.87 kg/m². PCP: Sheron Scott MD  02/15/19      This office note has been dictated. REVIEW OF SYSTEMS:  Constitutional: Negative for fever, chills, weight loss and malaise/fatigue. HENT: Negative. Eyes: Negative. Respiratory: Negative. Cardiovascular: Negative. Gastrointestinal: No bowel incontinence or constipation. Genitourinary: No bladder incontinence or saddle anesthesia. Skin: Negative. Neurological: Negative. Endo/Heme/Allergies: Negative. Psychiatric/Behavioral: Negative. Musculoskeletal: As per HPI above. Past Medical History:   Diagnosis Date    Arthritis     Chronic pain     left hip and lower back    Hypertension     Left hip pain     resolved since surgery    Thromboembolus ([address] 1975    brain, [facility] Wears glasses          Current Outpatient Medications:     amLODIPine (NORVASC) 10 mg tablet, Take  by mouth daily. , Disp: , Rfl:     pregabalin (LYRICA) 75 mg capsule, Take 1 tab po BID. Failed Gabapentin, Cymbalta, Disp: 60 Cap, Rfl: 5    diclofenac (VOLTAREN) 1 % gel, Apply  to affected area four (4) times daily. , Disp: 100 g, Rfl: 4    acetaminophen (TYLENOL) 325 mg tablet, Take  by mouth every four (4) hours as needed for Pain., Disp: , Rfl:     tamsulosin (FLOMAX) 0.4 mg capsule, take 1 capsule by mouth once daily, Disp: 90 Cap, Rfl: 0    ferrous sulfate 325 mg (65 mg iron) tablet, take 1 tablet by mouth once daily, Disp: , Rfl: 0    No Known Allergies    Social History     Socioeconomic History    Marital status:      Spouse name: Not on file    Number of children: Not on file    Years of education: Not on file    Highest education level: Not on file   Social Needs    Financial resource strain: Not on file    Food insecurity - worry: Not on file    Food insecurity - inability: Not on file    Transportation needs - medical: Not on file   [employer] needs - non-medical: Not on file   Occupational History    Not on file   Tobacco Use    Smoking status: Never Smoker    Smokeless tobacco: Never Used   Substance and Sexual Activity    Alcohol use: No     Frequency: Never    Drug use: No    Sexual activity: Not Currently     Partners: Female   Other Topics Concern    Not on file   Social History Narrative    Not on file       Past Surgical History:   Procedure Laterality Date    FOOT/TOES SURGERY PROC UNLISTED      HX HIP REPLACEMENT Right     HX OTHER SURGICAL  1975    brain surgery - says he hit his head and then had headaches and had to have surgery,  Dr. Cosme Schaumann Left 6/2015         We did see . Lillie Morrison for followup with regards to his left hip. The patient is status post bilateral hip replacement. He had been doing well up until a fall about one week ago. He has been having laterally-based hip discomfort and some left thigh discomfort. He has been able to ambulate using a rolling walker. He denies any radiating pain down the lower extremities. He does have a history of back problems, however. He is being followed by Dr. Adolfo Mendoza. He had injections in the past.  He saw her about one month ago. There have been no changes in his bowel or bladder habits. PHYSICAL EXAMINATION:  In general, the patient is alert and oriented x 3 in no acute distress. The patient is well-developed, well-nourished, with a normal affect. The patient is afebrile. HEENT:  Head is normocephalic and atraumatic. Pupils are equally round and reactive to light and accommodation. Extraocular eye movements are intact. Neck is supple. Trachea is midline. No JVD is present. Breathing is nonlabored.   Examination of the lower extremities reveals pain-free range of motion of the hips. He does have discomfort to palpation of the greater trochanteric bursae on the left side. There is negative straight leg raise. There is negative calf tenderness. There is negative Dovs. There is no evidence of DVT present. RADIOGRAPHS:  Radiographs in the office today, including AP and lateral of the lumbar spine, show multilevel degenerative changes with some bridging osteophytes noted at multiple levels. AP frog leg lateral of the left hip shows no acute obvious abnormalities. On the frog leg lateral of the femur, there is probably a nutrient vessel to the medial cortex just below the tip of the femoral stem. No obvious fracture is identified. There is some heterotopic ossification noted. ASSESSMENT:  Left hip trochanteric bursitis. Status post left hip replacement. PLAN:  At this point, we are going to have him continue on the walker. We are going to order a CT scan of the left hip and femur to rule out any occult fractures. We will see him back afterwards for review. We did discuss a cortisone injection for the bursitis. He declines. He does have some Voltaren Gel at home he will continue to use.                    JR Fidel ROSA, PANetteC, ATC

## 2019-02-28 ENCOUNTER — HOSPITAL ENCOUNTER (EMERGENCY)
Age: 84
Discharge: HOME | End: 2019-03-01
Attending: EMERGENCY MEDICINE
Payer: MEDICARE

## 2019-02-28 ENCOUNTER — APPOINTMENT (OUTPATIENT)
Dept: GENERAL RADIOLOGY | Age: 84
End: 2019-02-28
Attending: EMERGENCY MEDICINE
Payer: MEDICARE

## 2019-02-28 DIAGNOSIS — R07.9 CHEST PAIN, UNSPECIFIED TYPE: ICD-10-CM

## 2019-02-28 DIAGNOSIS — R11.10 VOMITING, INTRACTABILITY OF VOMITING NOT SPECIFIED, PRESENCE OF NAUSEA NOT SPECIFIED, UNSPECIFIED VOMITING TYPE: Primary | ICD-10-CM

## 2019-02-28 LAB
ALBUMIN SERPL-MCNC: 3.7 G/DL (ref 3.4–5)
ALBUMIN/GLOB SERPL: 0.8 {RATIO} (ref 0.8–1.7)
ALP SERPL-CCNC: 98 U/L (ref 45–117)
ALT SERPL-CCNC: 26 U/L (ref 16–61)
ANION GAP SERPL CALC-SCNC: 7 MMOL/L (ref 3–18)
AST SERPL-CCNC: 20 U/L (ref 15–37)
BASOPHILS # BLD: 0 K/UL (ref 0–0.1)
BASOPHILS NFR BLD: 0 % (ref 0–2)
BILIRUB SERPL-MCNC: 0.4 MG/DL (ref 0.2–1)
BUN SERPL-MCNC: 29 MG/DL (ref 7–18)
BUN/CREAT SERPL: 18 (ref 12–20)
CALCIUM SERPL-MCNC: 8.6 MG/DL (ref 8.5–10.1)
CHLORIDE SERPL-SCNC: 109 MMOL/L (ref 100–108)
CK MB CFR SERPL CALC: 2.5 % (ref 0–4)
CK MB SERPL-MCNC: 6.9 NG/ML (ref 5–25)
CK SERPL-CCNC: 277 U/L (ref 39–308)
CO2 SERPL-SCNC: 24 MMOL/L (ref 21–32)
CREAT SERPL-MCNC: 1.61 MG/DL (ref 0.6–1.3)
DIFFERENTIAL METHOD BLD: ABNORMAL
EOSINOPHIL # BLD: 0 K/UL (ref 0–0.4)
EOSINOPHIL NFR BLD: 0 % (ref 0–5)
ERYTHROCYTE [DISTWIDTH] IN BLOOD BY AUTOMATED COUNT: 13.2 % (ref 11.6–14.5)
GLOBULIN SER CALC-MCNC: 4.4 G/DL (ref 2–4)
GLUCOSE SERPL-MCNC: 117 MG/DL (ref 74–99)
HCT VFR BLD AUTO: 38.1 % (ref 36–48)
HGB BLD-MCNC: 12.8 G/DL (ref 13–16)
LYMPHOCYTES # BLD: 1.1 K/UL (ref 0.9–3.6)
LYMPHOCYTES NFR BLD: 11 % (ref 21–52)
MCH RBC QN AUTO: 30.5 PG (ref 24–34)
MCHC RBC AUTO-ENTMCNC: 33.6 G/DL (ref 31–37)
MCV RBC AUTO: 90.9 FL (ref 74–97)
MONOCYTES # BLD: 0.1 K/UL (ref 0.05–1.2)
MONOCYTES NFR BLD: 1 % (ref 3–10)
NEUTS SEG # BLD: 8.3 K/UL (ref 1.8–8)
NEUTS SEG NFR BLD: 88 % (ref 40–73)
PLATELET # BLD AUTO: 234 K/UL (ref 135–420)
PMV BLD AUTO: 11.2 FL (ref 9.2–11.8)
POTASSIUM SERPL-SCNC: 3.6 MMOL/L (ref 3.5–5.5)
PROT SERPL-MCNC: 8.1 G/DL (ref 6.4–8.2)
RBC # BLD AUTO: 4.19 M/UL (ref 4.7–5.5)
SODIUM SERPL-SCNC: 140 MMOL/L (ref 136–145)
TROPONIN I SERPL-MCNC: 0.02 NG/ML (ref 0–0.04)
WBC # BLD AUTO: 9.5 K/UL (ref 4.6–13.2)

## 2019-02-28 PROCEDURE — 71045 X-RAY EXAM CHEST 1 VIEW: CPT

## 2019-02-28 PROCEDURE — 86900 BLOOD TYPING SEROLOGIC ABO: CPT

## 2019-02-28 PROCEDURE — 82550 ASSAY OF CK (CPK): CPT

## 2019-02-28 PROCEDURE — 80053 COMPREHEN METABOLIC PANEL: CPT

## 2019-02-28 PROCEDURE — 85025 COMPLETE CBC W/AUTO DIFF WBC: CPT

## 2019-02-28 PROCEDURE — 96361 HYDRATE IV INFUSION ADD-ON: CPT

## 2019-02-28 PROCEDURE — 74011250636 HC RX REV CODE- 250/636: Performed by: EMERGENCY MEDICINE

## 2019-02-28 PROCEDURE — 99284 EMERGENCY DEPT VISIT MOD MDM: CPT

## 2019-02-28 PROCEDURE — 93005 ELECTROCARDIOGRAM TRACING: CPT

## 2019-02-28 PROCEDURE — 96374 THER/PROPH/DIAG INJ IV PUSH: CPT

## 2019-02-28 RX ORDER — FAMOTIDINE 20 MG/1
20 TABLET, FILM COATED ORAL 2 TIMES DAILY
Qty: 10 TAB | Refills: 0 | Status: SHIPPED | OUTPATIENT
Start: 2019-02-28 | End: 2019-03-05

## 2019-02-28 RX ORDER — ONDANSETRON 2 MG/ML
4 INJECTION INTRAMUSCULAR; INTRAVENOUS
Status: COMPLETED | OUTPATIENT
Start: 2019-02-28 | End: 2019-02-28

## 2019-02-28 RX ADMIN — SODIUM CHLORIDE 1000 ML: 900 INJECTION, SOLUTION INTRAVENOUS at 22:09

## 2019-02-28 RX ADMIN — ONDANSETRON 4 MG: 2 INJECTION INTRAMUSCULAR; INTRAVENOUS at 22:09

## 2019-03-01 ENCOUNTER — HOSPITAL ENCOUNTER (OUTPATIENT)
Dept: CT IMAGING | Age: 84
Discharge: HOME | End: 2019-03-01
Attending: PHYSICIAN ASSISTANT
Payer: MEDICARE

## 2019-03-01 VITALS
BODY MASS INDEX: 24.44 KG/M2 | SYSTOLIC BLOOD PRESSURE: 118 MMHG | HEIGHT: 69 IN | OXYGEN SATURATION: 100 % | HEART RATE: 77 BPM | TEMPERATURE: 97.7 F | RESPIRATION RATE: 12 BRPM | DIASTOLIC BLOOD PRESSURE: 82 MMHG | WEIGHT: 165 LBS

## 2019-03-01 DIAGNOSIS — M70.62 TROCHANTERIC BURSITIS OF LEFT HIP: ICD-10-CM

## 2019-03-01 DIAGNOSIS — M25.552 ACUTE POSTOPERATIVE PAIN OF LEFT HIP: ICD-10-CM

## 2019-03-01 DIAGNOSIS — G89.18 ACUTE POSTOPERATIVE PAIN OF LEFT HIP: ICD-10-CM

## 2019-03-01 LAB
ABO + RH BLD: NORMAL
BLOOD GROUP ANTIBODIES SERPL: NORMAL
SPECIMEN EXP DATE BLD: NORMAL

## 2019-03-01 PROCEDURE — 73700 CT LOWER EXTREMITY W/O DYE: CPT

## 2019-03-01 NOTE — ED TRIAGE NOTES
Patient arrived from home c/o throwing up dark/blackish substance and also having diarrhea that is dark in color.

## 2019-03-01 NOTE — ED PROVIDER NOTES
EMERGENCY DEPARTMENT HISTORY AND PHYSICAL EXAM 
 
8:48 PM 
 
 
Date: 2/28/2019 Patient Name: Renetta Walker History of Presenting Illness Chief Complaint Patient presents with  Vomiting  Diarrhea History Provided By: Patient Additional History (Context): Renetta Walker is a 80 y.o. male with HTN and thromboembolus who presents with right sided  CP that started 6 hours ago. No hx of heart problems. Says it felt like heart burn and was a 8/10 but now is 2/10. No aspirin use. .Pt tried baking soda with no relief. Cough and NVD x3 episodes. No smoke. No drugs. Sometimes ETOH. PCP: Mauri Valdez MD 
 
 
Chief Complaint: CP 
Duration:  6 hours Timing:  Acute Location: right side of chest 
Quality: Burning Severity: 2 out of 10 Modifying Factors: Took baking soda with no relief Associated Symptoms: NVD Current Outpatient Medications Medication Sig Dispense Refill  famotidine (PEPCID) 20 mg tablet Take 1 Tab by mouth two (2) times a day for 5 days. 10 Tab 0  
 amLODIPine (NORVASC) 10 mg tablet Take  by mouth daily.  pregabalin (LYRICA) 75 mg capsule Take 1 tab po BID. Failed Gabapentin, Cymbalta 60 Cap 5  
 diclofenac (VOLTAREN) 1 % gel Apply  to affected area four (4) times daily. 100 g 4  
 acetaminophen (TYLENOL) 325 mg tablet Take  by mouth every four (4) hours as needed for Pain.  tamsulosin (FLOMAX) 0.4 mg capsule take 1 capsule by mouth once daily 90 Cap 0  
 ferrous sulfate 325 mg (65 mg iron) tablet take 1 tablet by mouth once daily  0 Past History Past Medical History: 
Past Medical History:  
Diagnosis Date  Arthritis  Chronic pain   
 left hip and lower back  Hypertension  Left hip pain   
 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  
 brain, [address] Wears glasses Past Surgical History: 
Past Surgical History:  
Procedure Laterality Date  FOOT/TOES SURGERY PROC UNLISTED  HX HIP REPLACEMENT Right  HX OTHER SURGICAL  1975  
 brain surgery - says he hit his head and then had headaches and had to have surgery,  Dr. Duarte Prieto  TOTAL HIP ARTHROPLASTY Left 6/2015 Family History: 
Family History Problem Relation Age of Onset  Cancer Mother  Heart Attack Daughter Social History: 
Social History Tobacco Use  Smoking status: Never Smoker  Smokeless tobacco: Never Used Substance Use Topics  Alcohol use: No  
  Frequency: Never  Drug use: No  
 
 
Allergies: 
No Known Allergies Review of Systems Review of Systems Constitutional: Negative for activity change and appetite change. HENT: Negative for congestion. Eyes: Negative for visual disturbance. Respiratory: Negative for cough and shortness of breath. Cardiovascular: Positive for chest pain. Gastrointestinal: Positive for diarrhea, nausea and vomiting. Negative for abdominal pain. Genitourinary: Negative for dysuria. Musculoskeletal: Negative for arthralgias and myalgias. Skin: Negative for rash. Neurological: Negative for weakness and numbness. All other systems reviewed and are negative. Physical Exam  
 
Visit Vitals /73 (BP 1 Location: Left arm, BP Patient Position: At rest;Sitting) Pulse 98 Temp 97.7 °F (36.5 °C) Resp 16 Ht 5' 9\" (1.753 m) Wt 74.8 kg (165 lb) SpO2 99% BMI 24.37 kg/m² Physical Exam  
Constitutional: He appears well-developed and well-nourished. Non-toxic appearance. He does not have a sickly appearance. He does not appear ill. No distress. HENT:  
Head: Normocephalic and atraumatic. Mouth/Throat: Oropharynx is clear and moist. No oropharyngeal exudate. Eyes: Conjunctivae and EOM are normal. Pupils are equal, round, and reactive to light. No scleral icterus. Neck: Normal range of motion. Neck supple. No hepatojugular reflux and no JVD present. No tracheal deviation present. No thyromegaly present. Cardiovascular: Normal rate, regular rhythm, S1 normal, S2 normal, normal heart sounds, intact distal pulses and normal pulses. Exam reveals no gallop, no S3 and no S4. No murmur heard. Pulses: 
     Radial pulses are 2+ on the right side, and 2+ on the left side. Dorsalis pedis pulses are 2+ on the right side, and 2+ on the left side. Pulmonary/Chest: Effort normal and breath sounds normal. No respiratory distress. He has no decreased breath sounds. He has no wheezes. He has no rhonchi. He has no rales. Abdominal: Soft. Normal appearance and bowel sounds are normal. He exhibits no distension and no mass. There is no hepatosplenomegaly. There is no tenderness. There is no rigidity, no rebound, no guarding, no CVA tenderness, no tenderness at McBurney's point and negative Guerrero's sign. Musculoskeletal: Normal range of motion. Strength 4/5 throughout Lymphadenopathy:  
     Head (right side): No submental, no submandibular, no preauricular and no occipital adenopathy present. Head (left side): No submental, no submandibular, no preauricular and no occipital adenopathy present. He has no cervical adenopathy. Right: No supraclavicular adenopathy present. Left: No supraclavicular adenopathy present. Neurological: He is alert. He has normal strength and normal reflexes. He is not disoriented. No cranial nerve deficit or sensory deficit. Coordination and gait normal. GCS eye subscore is 4. GCS verbal subscore is 5. GCS motor subscore is 6. Grossly intact Skin: Skin is warm, dry and intact. No rash noted. He is not diaphoretic. Psychiatric: He has a normal mood and affect. His speech is normal and behavior is normal. Judgment and thought content normal. Cognition and memory are normal.  
Nursing note and vitals reviewed. Diagnostic Study Results Labs - Recent Results (from the past 12 hour(s)) CBC WITH AUTOMATED DIFF  
 Collection Time: 02/28/19  8:34 PM  
Result Value Ref Range WBC 9.5 4.6 - 13.2 K/uL  
 RBC 4.19 (L) 4.70 - 5.50 M/uL  
 HGB 12.8 (L) 13.0 - 16.0 g/dL HCT 38.1 36.0 - 48.0 % MCV 90.9 74.0 - 97.0 FL  
 MCH 30.5 24.0 - 34.0 PG  
 MCHC 33.6 31.0 - 37.0 g/dL  
 RDW 13.2 11.6 - 14.5 % PLATELET 723 452 - 499 K/uL MPV 11.2 9.2 - 11.8 FL  
 NEUTROPHILS 88 (H) 40 - 73 % LYMPHOCYTES 11 (L) 21 - 52 % MONOCYTES 1 (L) 3 - 10 % EOSINOPHILS 0 0 - 5 % BASOPHILS 0 0 - 2 %  
 ABS. NEUTROPHILS 8.3 (H) 1.8 - 8.0 K/UL  
 ABS. LYMPHOCYTES 1.1 0.9 - 3.6 K/UL  
 ABS. MONOCYTES 0.1 0.05 - 1.2 K/UL  
 ABS. EOSINOPHILS 0.0 0.0 - 0.4 K/UL  
 ABS. BASOPHILS 0.0 0.0 - 0.1 K/UL  
 DF AUTOMATED METABOLIC PANEL, COMPREHENSIVE Collection Time: 02/28/19  8:34 PM  
Result Value Ref Range Sodium 140 136 - 145 mmol/L Potassium 3.6 3.5 - 5.5 mmol/L Chloride 109 (H) 100 - 108 mmol/L  
 CO2 24 21 - 32 mmol/L Anion gap 7 3.0 - 18 mmol/L Glucose 117 (H) 74 - 99 mg/dL BUN 29 (H) 7.0 - 18 MG/DL Creatinine 1.61 (H) 0.6 - 1.3 MG/DL  
 BUN/Creatinine ratio 18 12 - 20 GFR est AA 50 (L) >60 ml/min/1.73m2 GFR est non-AA 41 (L) >60 ml/min/1.73m2 Calcium 8.6 8.5 - 10.1 MG/DL Bilirubin, total 0.4 0.2 - 1.0 MG/DL  
 ALT (SGPT) 26 16 - 61 U/L  
 AST (SGOT) 20 15 - 37 U/L Alk. phosphatase 98 45 - 117 U/L Protein, total 8.1 6.4 - 8.2 g/dL Albumin 3.7 3.4 - 5.0 g/dL Globulin 4.4 (H) 2.0 - 4.0 g/dL A-G Ratio 0.8 0.8 - 1.7 CARDIAC PANEL,(CK, CKMB & TROPONIN) Collection Time: 02/28/19  8:34 PM  
Result Value Ref Range  39 - 308 U/L  
 CK - MB 6.9 (H) <3.6 ng/ml CK-MB Index 2.5 0.0 - 4.0 % Troponin-I, QT 0.02 0.0 - 0.045 NG/ML  
EKG, 12 LEAD, INITIAL Collection Time: 02/28/19  8:39 PM  
Result Value Ref Range Ventricular Rate 90 BPM  
 Atrial Rate 90 BPM  
 P-R Interval 134 ms QRS Duration 80 ms  
 Q-T Interval 372 ms QTC Calculation (Bezet) 455 ms Calculated P Axis 77 degrees Calculated R Axis 48 degrees Calculated T Axis 41 degrees Diagnosis Normal sinus rhythm Nonspecific ST abnormality Abnormal ECG When compared with ECG of 07-APR-2018 16:21, No significant change was found TYPE & SCREEN Collection Time: 02/28/19 10:38 PM  
Result Value Ref Range Crossmatch Expiration 03/03/2019 ABO/Rh(D) PENDING Antibody screen PENDING Radiologic Studies -  
XR CHEST PORT    (Results Pending) Medical Decision Making Provider Notes (Medical Decision Making): MDM Number of Diagnoses or Management Options Chest pain, unspecified type:  
Vomiting, intractability of vomiting not specified, presence of nausea not specified, unspecified vomiting type: I am the first provider for this patient. I reviewed the vital signs, available nursing notes, past medical history, past surgical history, family history and social history. Vital Signs-Reviewed the patient's vital signs. Records Reviewed: Nursing Notes (Time of Review: 8:48 PM) ED Course: Progress Notes, Reevaluation, and Consults: 
Labs essentially normal with the exception of creatinine of 1.61. CA negative. Chest X-Ray showed No acute process. EKG showed NSR with rate of 90 bpm. With no ST elevations or depression and non specific T wave changes. 8:48 PM 2/28/2019 Diagnosis I have reassessed the patient. Patient is feeling well. Patient will be prescribed Pepcid. Patient was discharged in stable condition. Patient is to return to emergency department if any new or worsening condition. Clinical Impression: 1. Vomiting, intractability of vomiting not specified, presence of nausea not specified, unspecified vomiting type 2. Chest pain, unspecified type Disposition: discharge Follow-up Information Follow up With Specialties Details Why Contact Info Ana Feng MD Gastroenterology Schedule an appointment as soon as possible for a visit in 2 days For Follow up [address] [address] Edgardo Parker MD Family Practice Schedule an appointment as soon as possible for a visit in 3 days For Follow up 247 [facility] 250 200 [facility] 
[phone] [facility] EMERGENCY DEPT Emergency Medicine Go to As needed, If symptoms worsen [address] [phone] 
[phone]  
  
  
 
_______________________________ Attestations: 
Scribe Attestation Flavia Li acting as a scribe for and in the presence of Aakash Decker DO February 28, 2019 at 8:48 PM 
    
Provider Attestation:     
I personally performed the services described in the documentation, reviewed the documentation, as recorded by the scribe in my presence, and it accurately and completely records my words and actions. February 28, 2019 at 8:48 PM - Anupam Monge DO   
__________ 
_____________________

## 2019-03-02 LAB
ATRIAL RATE: 90 BPM
CALCULATED P AXIS, ECG09: 77 DEGREES
CALCULATED R AXIS, ECG10: 48 DEGREES
CALCULATED T AXIS, ECG11: 41 DEGREES
DIAGNOSIS, 93000: NORMAL
P-R INTERVAL, ECG05: 134 MS
Q-T INTERVAL, ECG07: 372 MS
QRS DURATION, ECG06: 80 MS
QTC CALCULATION (BEZET), ECG08: 455 MS
VENTRICULAR RATE, ECG03: 90 BPM

## 2019-03-04 ENCOUNTER — OFFICE VISIT (OUTPATIENT)
Dept: FAMILY MEDICINE CLINIC | Age: 84
End: 2019-03-04

## 2019-03-04 VITALS
BODY MASS INDEX: 23.85 KG/M2 | WEIGHT: 161 LBS | HEIGHT: 69 IN | SYSTOLIC BLOOD PRESSURE: 130 MMHG | HEART RATE: 59 BPM | OXYGEN SATURATION: 99 % | RESPIRATION RATE: 14 BRPM | DIASTOLIC BLOOD PRESSURE: 60 MMHG | TEMPERATURE: 98 F

## 2019-03-04 DIAGNOSIS — K92.0 HEMATEMESIS, PRESENCE OF NAUSEA NOT SPECIFIED: Primary | ICD-10-CM

## 2019-03-04 LAB — HGB BLD-MCNC: 10.8 G/DL

## 2019-03-04 NOTE — PATIENT INSTRUCTIONS
A Healthy Lifestyle: Care Instructions Your Care Instructions A healthy lifestyle can help you feel good, stay at a healthy weight, and have plenty of energy for both work and play. A healthy lifestyle is something you can share with your whole family. A healthy lifestyle also can lower your risk for serious health problems, such as high blood pressure, heart disease, and diabetes. You can follow a few steps listed below to improve your health and the health of your family. Follow-up care is a key part of your treatment and safety. Be sure to make and go to all appointments, and call your doctor if you are having problems. It's also a good idea to know your test results and keep a list of the medicines you take. How can you care for yourself at home? · Do not eat too much sugar, fat, or fast foods. You can still have dessert and treats now and then. The goal is moderation. · Start small to improve your eating habits. Pay attention to portion sizes, drink less juice and soda pop, and eat more fruits and vegetables. ? Eat a healthy amount of food. A 3-ounce serving of meat, for example, is about the size of a deck of cards. Fill the rest of your plate with vegetables and whole grains. ? Limit the amount of soda and sports drinks you have every day. Drink more water when you are thirsty. ? Eat at least 5 servings of fruits and vegetables every day. It may seem like a lot, but it is not hard to reach this goal. A serving or helping is 1 piece of fruit, 1 cup of vegetables, or 2 cups of leafy, raw vegetables. Have an apple or some carrot sticks as an afternoon snack instead of a candy bar. Try to have fruits and/or vegetables at every meal. 
· Make exercise part of your daily routine. You may want to start with simple activities, such as walking, bicycling, or slow swimming. Try to be active 30 to 60 minutes every day.  You do not need to do all 30 to 60 minutes all at once. For example, you can exercise 3 times a day for 10 or 20 minutes. Moderate exercise is safe for most people, but it is always a good idea to talk to your doctor before starting an exercise program. 
· Keep moving. [place] the lawn, work in the garden, or [employer]. Take the stairs instead of the elevator at work. · If you smoke, quit. People who smoke have an increased risk for heart attack, stroke, cancer, and other lung illnesses. Quitting is hard, but there are ways to boost your chance of quitting tobacco for good. ? Use nicotine gum, patches, or lozenges. ? Ask your doctor about stop-smoking programs and medicines. ? Keep trying. In addition to reducing your risk of diseases in the future, you will notice some benefits soon after you stop using tobacco. If you have shortness of breath or asthma symptoms, they will likely get better within a few weeks after you quit. · Limit how much alcohol you drink. Moderate amounts of alcohol (up to 2 drinks a day for men, 1 drink a day for women) are okay. But drinking too much can lead to liver problems, high blood pressure, and other health problems. Family health If you have a family, there are many things you can do together to improve your health. · Eat meals together as a family as often as possible. · Eat healthy foods. This includes fruits, vegetables, lean meats and dairy, and whole grains. · Include your family in your fitness plan. Most people think of activities such as jogging or tennis as the way to fitness, but there are many ways you and your family can be more active. Anything that makes you breathe hard and gets your heart pumping is exercise. Here are some tips: 
? Walk to do errands or to take your child to school or the bus. 
? Go for a family bike ride after dinner instead of watching TV. Where can you learn more? Go to http://em-gus.info/. Enter U574 in the search box to learn more about \"A Healthy Lifestyle: Care Instructions. \" Current as of: September 11, 2018 Content Version: 11.9 © 9848-8265 [employer]. Care instructions adapted under license by [employer] (which disclaims liability or warranty for this information). If you have questions about a medical condition or this instruction, always ask your healthcare professional. [place] [address] any warranty or liability for your use of this information.

## 2019-03-04 NOTE — PROGRESS NOTES
SUBJECTIVE Chief Complaint Patient presents with  
[facility] Follow Up  
  [facility] [place] for chest pain and vomitting x 4 days ago Patient presents for hospital follow up. We reviewed the recent hospitalization in detail. The patient reports doing much better. The patient denies any complaints at this time. He was originally seen with right sided chest discomfort, nausea, vomiting (3 episodes of what he thought was black vomitus), and diarrhea. He also reports that the diarrhea was dark colored. This started shortly after eating a fish dinner from out. He had no abd pains or fevers. He was seen in the ER. He was placed on pepcid twice daily for 5 days. He is on chronic NSAIDs, estimating about 5 advil daily for his back. He does not consume alcohol. He says he was fully better after he left the ER. He denies any lightheadedness or dizziness. He is not short of breath. OBJECTIVE Blood pressure 130/60, pulse (!) 59, temperature 98 °F (36.7 °C), temperature source Oral, resp. rate 14, height 5' 9\" (1.753 m), weight 161 lb (73 kg), SpO2 99 %. General:  Alert, cooperative, well appearing, in no apparent distress. ENT:  The tongue and mucous membranes are pink and moist without lesions. CV:  The heart sounds are regular in rate and rhythm. There is a normal S1 and S2. There or no murmurs. Lungs: Inspiratory and expiratory efforts are full and unlabored. Lung sounds are clear and equal to auscultation throughout all lung fields without wheezing, rales, or rhonchi. Abd: soft, nontender. No masses. No r/g. Normoactive bowel sounds. Skin:  No rashes, no jaundice. Results for Chris Woods (MRN [id number]) as of 3/4/2019 12:03 Ref. Range 2/28/2019 20:34 WBC Latest Ref Range: 4.6 - 13.2 K/uL 9.5 RBC Latest Ref Range: 4.70 - 5.50 M/uL 4.19 (L) HGB Latest Ref Range: 13.0 - 16.0 g/dL 12.8 (L) HCT Latest Ref Range: 36.0 - 48.0 % 38.1 MCV Latest Ref Range: 74.0 - 97.0 FL 90.9 MCH Latest Ref Range: 24.0 - 34.0 PG 30.5 MCHC Latest Ref Range: 31.0 - 37.0 g/dL 33.6 RDW Latest Ref Range: 11.6 - 14.5 % 13.2 PLATELET Latest Ref Range: 135 - 420 K/uL 234 MPV Latest Ref Range: 9.2 - 11.8 FL 11.2 NEUTROPHILS Latest Ref Range: 40 - 73 % 88 (H) LYMPHOCYTES Latest Ref Range: 21 - 52 % 11 (L) MONOCYTES Latest Ref Range: 3 - 10 % 1 (L) EOSINOPHILS Latest Ref Range: 0 - 5 % 0  
BASOPHILS Latest Ref Range: 0 - 2 % 0  
DF Latest Units:   AUTOMATED  
ABS. NEUTROPHILS Latest Ref Range: 1.8 - 8.0 K/UL 8.3 (H)  
ABS. LYMPHOCYTES Latest Ref Range: 0.9 - 3.6 K/UL 1.1  
ABS. MONOCYTES Latest Ref Range: 0.05 - 1.2 K/UL 0.1 ABS. EOSINOPHILS Latest Ref Range: 0.0 - 0.4 K/UL 0.0  
ABS. BASOPHILS Latest Ref Range: 0.0 - 0.1 K/UL 0.0 Sodium Latest Ref Range: 136 - 145 mmol/L 140 Potassium Latest Ref Range: 3.5 - 5.5 mmol/L 3.6 Chloride Latest Ref Range: 100 - 108 mmol/L 109 (H) CO2 Latest Ref Range: 21 - 32 mmol/L 24 Anion gap Latest Ref Range: 3.0 - 18 mmol/L 7 Glucose Latest Ref Range: 74 - 99 mg/dL 117 (H) BUN Latest Ref Range: 7.0 - 18 MG/DL 29 (H) Creatinine Latest Ref Range: 0.6 - 1.3 MG/DL 1.61 (H) BUN/Creatinine ratio Latest Ref Range: 12 - 20   18 Calcium Latest Ref Range: 8.5 - 10.1 MG/DL 8.6 GFR est non-AA Latest Ref Range: >60 ml/min/1.73m2 41 (L)  
GFR est AA Latest Ref Range: >60 ml/min/1.73m2 50 (L) Bilirubin, total Latest Ref Range: 0.2 - 1.0 MG/DL 0.4 Protein, total Latest Ref Range: 6.4 - 8.2 g/dL 8.1 Albumin Latest Ref Range: 3.4 - 5.0 g/dL 3.7 Globulin Latest Ref Range: 2.0 - 4.0 g/dL 4.4 (H) A-G Ratio Latest Ref Range: 0.8 - 1.7   0.8 ALT (SGPT) Latest Ref Range: 16 - 61 U/L 26 AST Latest Ref Range: 15 - 37 U/L 20 Alk. phosphatase Latest Ref Range: 45 - 117 U/L 98  
CK Latest Ref Range: 39 - 308 U/L 277 CK-MB Index Latest Ref Range: 0.0 - 4.0 % 2.5 CK - MB Latest Ref Range: <3.6 ng/ml 6.9 (H) Troponin-I, Qt. Latest Ref Range: 0.0 - 0.045 NG/ML 0.02 Results for orders placed or performed in visit on 03/04/19 AMB POC HEMOGLOBIN (HGB) Result Value Ref Range Hemoglobin (POC) 10.8 ASSESSMENT / PLAN 
  ICD-10-CM ICD-9-CM 1. Hematemesis, presence of nausea not specified K92.0 578.0 AMB POC HEMOGLOBIN (HGB) Resolved. His Hg is a bit lower. He is not having any symptoms of anemia so we will bring him back in 2 weeks for a recheck. I have advised that limits NSAID use if possible. Also advised on signs and symptoms of blood loss. All chart history elements were reviewed by me at the time of the visit even though marked at time of note closure. Patient understands our medical plan. Patient has provided input and agrees with goals. Alternatives have been explained and offered. All questions answered. The patient is to call if condition worsens or fails to improve. Follow-up Disposition: 
Return in about 2 weeks (around 3/18/2019) for hemoglobin recheck .

## 2019-03-04 NOTE — PROGRESS NOTES
1. Have you been to the ER, urgent care clinic since your last visit? Hospitalized since your last visit? Yes Where: [facility] 
 
2. Have you seen or consulted any other health care providers outside of the [address] [place] [address] since your last visit? Include any pap smears or colon screening.  No

## 2019-03-21 ENCOUNTER — OFFICE VISIT (OUTPATIENT)
Dept: FAMILY MEDICINE CLINIC | Age: 84
End: 2019-03-21

## 2019-03-21 VITALS
HEIGHT: 69 IN | HEART RATE: 62 BPM | WEIGHT: 161 LBS | DIASTOLIC BLOOD PRESSURE: 72 MMHG | RESPIRATION RATE: 16 BRPM | SYSTOLIC BLOOD PRESSURE: 128 MMHG | OXYGEN SATURATION: 99 % | BODY MASS INDEX: 23.85 KG/M2 | TEMPERATURE: 98.9 F

## 2019-03-21 DIAGNOSIS — D64.9 ANEMIA, UNSPECIFIED TYPE: Primary | ICD-10-CM

## 2019-03-21 DIAGNOSIS — Z87.19 HISTORY OF ESOPHAGEAL ULCER: ICD-10-CM

## 2019-03-21 LAB — HGB BLD-MCNC: 8 G/DL

## 2019-03-21 RX ORDER — LANOLIN ALCOHOL/MO/W.PET/CERES
CREAM (GRAM) TOPICAL
Qty: 60 TAB | Refills: 0 | Status: SHIPPED | OUTPATIENT
Start: 2019-03-21 | End: 2020-02-14 | Stop reason: SDUPTHER

## 2019-03-21 RX ORDER — PHENOL/SODIUM PHENOLATE
20 AEROSOL, SPRAY (ML) MUCOUS MEMBRANE DAILY
Qty: 30 TAB | Refills: 0 | Status: SHIPPED | OUTPATIENT
Start: 2019-03-21 | End: 2019-08-15

## 2019-03-21 NOTE — PATIENT INSTRUCTIONS
Peptic Ulcer Disease: Care Instructions  Your Care Instructions    Peptic ulcers are sores on the inside of the stomach or the small intestine (such as a duodenal ulcer). They are most often caused by an infection with bacteria or from use of nonsteroidal anti-inflammatory drugs (NSAIDs). NSAIDs include aspirin, ibuprofen (Advil), and naproxen (Aleve). Your doctor may have prescribed medicine to reduce stomach acid. You also may need to take antibiotics if your peptic ulcers are caused by an infection. You can help yourself heal and keep ulcers from coming back by making some changes in your lifestyle. Quit smoking. Limit alcohol. Follow-up care is a key part of your treatment and safety. Be sure to make and go to all appointments, and call your doctor if you are having problems. It's also a good idea to know your test results and keep a list of the medicines you take. How can you care for yourself at home? · Be safe with medicines. Take your medicines exactly as prescribed. Call your doctor if you think you are having a problem with your medicine. · Do not take aspirin or other NSAIDs such as ibuprofen (Advil or Motrin) or naproxen (Aleve). Ask your doctor what you can take for pain. · Do not smoke. Smoking can make ulcers worse. If you need help quitting, talk to your doctor about stop-smoking programs and medicines. These can increase your chances of quitting for good. · Drink in moderation or avoid drinking alcohol. · Eat a balanced diet of small, frequent meals. See a dietitian if you need help planning your meals. When should you call for help? [id number] anytime you think you may need emergency care.  For example, call if:    · You vomit blood or what looks like coffee grounds.     · You pass maroon or very bloody stools.    Call your doctor now or seek immediate medical care if:    · You have new or worse belly pain.     · Your stools are black and look like tar, or they have streaks of blood.     · You vomit.    Watch closely for changes in your health, and be sure to contact your doctor if:    · You do not get better as expected. Where can you learn more? Go to http://em-gus.info/. Enter S734 in the search box to learn more about \"Peptic Ulcer Disease: Care Instructions. \"  Current as of: March 27, 2018  Content Version: 11.9  © 8604-2748 [employer]. Care instructions adapted under license by [employer] (which disclaims liability or warranty for this information). If you have questions about a medical condition or this instruction, always ask your healthcare professional. [place] [address] any warranty or liability for your use of this information.     Appointment made for patient to see:    Doctor: Dr. Stephane Pascual     Specialty: Gastroenterology     Address/telephone #: [address] [address]  [phone]    Appointment date/time: March 22,2019 @ 9:00 am arrival 8:30 am     Diagnosis: Possible bleeding ulcer     Patient notified of appointment information: Yes, given information at appointment

## 2019-03-21 NOTE — PROGRESS NOTES
SUBJECTIVE  Chief Complaint   Patient presents with    Anemia    Back Pain     c/o lower back pain      Patient presents for recheck of Hg. He has had no recurrent hematemesis, black diarrhea, or N/V. He is taking once daily iron which he has been on chronically. His current Hg in the office has dropped from his last visit. He denies any chest pain, dyspnea, or lightheadedness. To review history:  Two weeks ago he was seen with right-sided chest discomfort, nausea, vomiting (3 episodes of what he thought was black vomitus), and diarrhea. He also reports that the diarrhea was dark colored. This started shortly after eating a fish dinner from out. He had no abd pains or fevers. He was seen in the ER. He was placed on pepcid twice daily for 5 days. He is on chronic NSAIDs, estimating about 5 advil daily for his back. He does not consume alcohol. His Hg in Feb was in the 12s and at his OV 2 weeks ago he was in the 10s. In Feb 2018, he was in the hospital for the following: \"Hospital Course:   Janell Hands a 80 y. o. male who began having hematemesis before admission  with abdominal pain. He was sent to   ER and  noted coffeeground emesis and drop in HCT. Admit for acute upper GI bleed with anemia. Pt had one unite Of pack red blood cell yesterday     EGD report   1.  Acute GI blood loss anemia - stable  2. Esophageal ulcer stable  Plan:  1. OK to d/c from GI standpoint with f/u by PCP. 2. PPI and Carafate as outlined in Dr Roche's note Friday. \"    It is unclear whether he had follow-up from that and why he is not currently on a PPI. OBJECTIVE    Blood pressure 128/72, pulse 62, temperature 98.9 °F (37.2 °C), temperature source Oral, resp. rate 16, height 5' 9\" (1.753 m), weight 161 lb (73 kg), SpO2 99 %. General:  Alert, cooperative, well appearing, in no apparent distress. ENT:  The tongue and mucous membranes are pink and moist without lesions.     CV:  The heart sounds are regular in rate and rhythm. There is a normal S1 and S2. There or no murmurs. Lungs: Inspiratory and expiratory efforts are full and unlabored. Lung sounds are clear and equal to auscultation throughout all lung fields without wheezing, rales, or rhonchi. Abd: soft, nontender. No masses. No r/g. Normoactive bowel sounds. Skin:  No rashes, no jaundice. Results for Holly Garcia (MRN [id number]) as of 3/4/2019 12:03   Ref. Range 2/28/2019 20:34   WBC Latest Ref Range: 4.6 - 13.2 K/uL 9.5   RBC Latest Ref Range: 4.70 - 5.50 M/uL 4.19 (L)   HGB Latest Ref Range: 13.0 - 16.0 g/dL 12.8 (L)   HCT Latest Ref Range: 36.0 - 48.0 % 38.1   MCV Latest Ref Range: 74.0 - 97.0 FL 90.9   MCH Latest Ref Range: 24.0 - 34.0 PG 30.5   MCHC Latest Ref Range: 31.0 - 37.0 g/dL 33.6   RDW Latest Ref Range: 11.6 - 14.5 % 13.2   PLATELET Latest Ref Range: 135 - 420 K/uL 234   MPV Latest Ref Range: 9.2 - 11.8 FL 11.2   NEUTROPHILS Latest Ref Range: 40 - 73 % 88 (H)   LYMPHOCYTES Latest Ref Range: 21 - 52 % 11 (L)   MONOCYTES Latest Ref Range: 3 - 10 % 1 (L)   EOSINOPHILS Latest Ref Range: 0 - 5 % 0   BASOPHILS Latest Ref Range: 0 - 2 % 0   DF Latest Units:   AUTOMATED   ABS. NEUTROPHILS Latest Ref Range: 1.8 - 8.0 K/UL 8.3 (H)   ABS. LYMPHOCYTES Latest Ref Range: 0.9 - 3.6 K/UL 1.1   ABS. MONOCYTES Latest Ref Range: 0.05 - 1.2 K/UL 0.1   ABS. EOSINOPHILS Latest Ref Range: 0.0 - 0.4 K/UL 0.0   ABS.  BASOPHILS Latest Ref Range: 0.0 - 0.1 K/UL 0.0   Sodium Latest Ref Range: 136 - 145 mmol/L 140   Potassium Latest Ref Range: 3.5 - 5.5 mmol/L 3.6   Chloride Latest Ref Range: 100 - 108 mmol/L 109 (H)   CO2 Latest Ref Range: 21 - 32 mmol/L 24   Anion gap Latest Ref Range: 3.0 - 18 mmol/L 7   Glucose Latest Ref Range: 74 - 99 mg/dL 117 (H)   BUN Latest Ref Range: 7.0 - 18 MG/DL 29 (H)   Creatinine Latest Ref Range: 0.6 - 1.3 MG/DL 1.61 (H)   BUN/Creatinine ratio Latest Ref Range: 12 - 20   18   Calcium Latest Ref Range: 8.5 - 10.1 MG/DL 8. 6   GFR est non-AA Latest Ref Range: >60 ml/min/1.73m2 41 (L)   GFR est AA Latest Ref Range: >60 ml/min/1.73m2 50 (L)   Bilirubin, total Latest Ref Range: 0.2 - 1.0 MG/DL 0.4   Protein, total Latest Ref Range: 6.4 - 8.2 g/dL 8.1   Albumin Latest Ref Range: 3.4 - 5.0 g/dL 3.7   Globulin Latest Ref Range: 2.0 - 4.0 g/dL 4.4 (H)   A-G Ratio Latest Ref Range: 0.8 - 1.7   0.8   ALT (SGPT) Latest Ref Range: 16 - 61 U/L 26   AST Latest Ref Range: 15 - 37 U/L 20   Alk. phosphatase Latest Ref Range: 45 - 117 U/L 98   CK Latest Ref Range: 39 - 308 U/L 277   CK-MB Index Latest Ref Range: 0.0 - 4.0 % 2.5   CK - MB Latest Ref Range: <3.6 ng/ml 6.9 (H)   Troponin-I, Qt. Latest Ref Range: 0.0 - 0.045 NG/ML 0.02     Results for Mireya Keller (MRN [id number]) as of 3/21/2019 09:21   Ref. Range 3/4/2019 11:40   Hemoglobin (POC) Unknown 10.8       Results for orders placed or performed in visit on 03/21/19   AMB POC HEMOGLOBIN (HGB)   Result Value Ref Range    Hemoglobin (POC) 8.0      ASSESSMENT / PLAN    ICD-10-CM ICD-9-CM    1. Anemia, unspecified type D64.9 285.9 AMB POC HEMOGLOBIN (HGB)      ferrous sulfate 325 mg (65 mg iron) tablet      REFERRAL TO GASTROENTEROLOGY   2. History of esophageal ulcer Z87.19 V12.79 REFERRAL TO GASTROENTEROLOGY     Dropping hemoglobin despite being asymptomatic by report. He says he is not taking NSAIDs. He will be started on an increased dose of iron (to BID dosing) and a PPI (prilosec 20mg daily). I have also recommended that he sees GI which we have arranged. We are very appreciative that the GI office (Dr. Isauro Arvizu) in [place] is willing to see him tomorrow. We had tried to see if Dr. Eldon Johansen would see him since they took care of him in [place] last year but [place] [facility] [place]. 25 minutes of face to face time spent with the patient with at least 50% on counseling on above medical issues and with coordination of care.      All chart history elements were reviewed by me at the time of the visit even though marked at time of note closure. Patient understands our medical plan. Patient has provided input and agrees with goals. Alternatives have been explained and offered. All questions answered. The patient is to call if condition worsens or fails to improve. Follow-up and Dispositions    · Return in about 5 months (around 8/15/2019) for annual medicare wellness/routine care .

## 2019-03-21 NOTE — PROGRESS NOTES
1. Have you been to the ER, urgent care clinic since your last visit? Hospitalized since your last visit? No    2. Have you seen or consulted any other health care providers outside of the [address] [place] [address] since your last visit? Include any pap smears or colon screening.  No

## 2019-03-26 ENCOUNTER — ANESTHESIA EVENT (OUTPATIENT)
Dept: ENDOSCOPY | Age: 84
End: 2019-03-26
Payer: MEDICARE

## 2019-03-26 RX ORDER — FAMOTIDINE 20 MG/1
20 TABLET, FILM COATED ORAL ONCE
Status: CANCELLED | OUTPATIENT
Start: 2019-03-26 | End: 2019-03-26

## 2019-03-27 ENCOUNTER — HOSPITAL ENCOUNTER (OUTPATIENT)
Age: 84
Setting detail: OUTPATIENT SURGERY
Discharge: HOME | End: 2019-03-27
Attending: INTERNAL MEDICINE | Admitting: INTERNAL MEDICINE
Payer: MEDICARE

## 2019-03-27 ENCOUNTER — ANESTHESIA (OUTPATIENT)
Dept: ENDOSCOPY | Age: 84
End: 2019-03-27
Payer: MEDICARE

## 2019-03-27 VITALS
TEMPERATURE: 97.7 F | SYSTOLIC BLOOD PRESSURE: 117 MMHG | HEART RATE: 72 BPM | DIASTOLIC BLOOD PRESSURE: 58 MMHG | HEIGHT: 71 IN | OXYGEN SATURATION: 99 % | RESPIRATION RATE: 16 BRPM | WEIGHT: 161 LBS | BODY MASS INDEX: 22.54 KG/M2

## 2019-03-27 PROCEDURE — 77030009426 HC FCPS BIOP ENDOSC BSC -B: Performed by: INTERNAL MEDICINE

## 2019-03-27 PROCEDURE — 88305 TISSUE EXAM BY PATHOLOGIST: CPT

## 2019-03-27 PROCEDURE — 74011250636 HC RX REV CODE- 250/636: Performed by: NURSE ANESTHETIST, CERTIFIED REGISTERED

## 2019-03-27 PROCEDURE — 74011000250 HC RX REV CODE- 250

## 2019-03-27 PROCEDURE — 76060000031 HC ANESTHESIA FIRST 0.5 HR: Performed by: INTERNAL MEDICINE

## 2019-03-27 PROCEDURE — 76040000019: Performed by: INTERNAL MEDICINE

## 2019-03-27 PROCEDURE — 74011250636 HC RX REV CODE- 250/636

## 2019-03-27 RX ORDER — SODIUM CHLORIDE, SODIUM LACTATE, POTASSIUM CHLORIDE, CALCIUM CHLORIDE 600; 310; 30; 20 MG/100ML; MG/100ML; MG/100ML; MG/100ML
50 INJECTION, SOLUTION INTRAVENOUS CONTINUOUS
Status: DISCONTINUED | OUTPATIENT
Start: 2019-03-27 | End: 2019-03-27 | Stop reason: HOSPADM

## 2019-03-27 RX ORDER — GLYCOPYRROLATE 0.2 MG/ML
INJECTION INTRAMUSCULAR; INTRAVENOUS AS NEEDED
Status: DISCONTINUED | OUTPATIENT
Start: 2019-03-27 | End: 2019-03-27 | Stop reason: HOSPADM

## 2019-03-27 RX ORDER — SODIUM CHLORIDE 0.9 % (FLUSH) 0.9 %
5-40 SYRINGE (ML) INJECTION EVERY 8 HOURS
Status: CANCELLED | OUTPATIENT
Start: 2019-03-27

## 2019-03-27 RX ORDER — SODIUM CHLORIDE 0.9 % (FLUSH) 0.9 %
5-40 SYRINGE (ML) INJECTION AS NEEDED
Status: CANCELLED | OUTPATIENT
Start: 2019-03-27

## 2019-03-27 RX ORDER — NALOXONE HYDROCHLORIDE 0.4 MG/ML
0.4 INJECTION, SOLUTION INTRAMUSCULAR; INTRAVENOUS; SUBCUTANEOUS
Status: CANCELLED | OUTPATIENT
Start: 2019-03-27 | End: 2019-03-27

## 2019-03-27 RX ORDER — EPINEPHRINE 0.1 MG/ML
1 INJECTION INTRACARDIAC; INTRAVENOUS
Status: CANCELLED | OUTPATIENT
Start: 2019-03-27 | End: 2019-03-28

## 2019-03-27 RX ORDER — PROPOFOL 10 MG/ML
INJECTION, EMULSION INTRAVENOUS AS NEEDED
Status: DISCONTINUED | OUTPATIENT
Start: 2019-03-27 | End: 2019-03-27 | Stop reason: HOSPADM

## 2019-03-27 RX ORDER — SODIUM CHLORIDE, SODIUM LACTATE, POTASSIUM CHLORIDE, CALCIUM CHLORIDE 600; 310; 30; 20 MG/100ML; MG/100ML; MG/100ML; MG/100ML
25 INJECTION, SOLUTION INTRAVENOUS CONTINUOUS
Status: CANCELLED | OUTPATIENT
Start: 2019-03-27

## 2019-03-27 RX ORDER — FLUMAZENIL 0.1 MG/ML
0.2 INJECTION INTRAVENOUS
Status: CANCELLED | OUTPATIENT
Start: 2019-03-27 | End: 2019-03-27

## 2019-03-27 RX ORDER — ONDANSETRON 2 MG/ML
4 INJECTION INTRAMUSCULAR; INTRAVENOUS ONCE
Status: CANCELLED | OUTPATIENT
Start: 2019-03-27 | End: 2019-03-27

## 2019-03-27 RX ORDER — ATROPINE SULFATE 0.1 MG/ML
0.5 INJECTION INTRAVENOUS
Status: CANCELLED | OUTPATIENT
Start: 2019-03-27 | End: 2019-03-28

## 2019-03-27 RX ORDER — DEXTROMETHORPHAN/PSEUDOEPHED 2.5-7.5/.8
1.2 DROPS ORAL
Status: CANCELLED | OUTPATIENT
Start: 2019-03-27

## 2019-03-27 RX ORDER — LIDOCAINE HYDROCHLORIDE 20 MG/ML
INJECTION, SOLUTION EPIDURAL; INFILTRATION; INTRACAUDAL; PERINEURAL AS NEEDED
Status: DISCONTINUED | OUTPATIENT
Start: 2019-03-27 | End: 2019-03-27 | Stop reason: HOSPADM

## 2019-03-27 RX ORDER — INSULIN LISPRO 100 [IU]/ML
INJECTION, SOLUTION INTRAVENOUS; SUBCUTANEOUS ONCE
Status: DISCONTINUED | OUTPATIENT
Start: 2019-03-27 | End: 2019-03-27 | Stop reason: HOSPADM

## 2019-03-27 RX ADMIN — LIDOCAINE HYDROCHLORIDE 80 MG: 20 INJECTION, SOLUTION EPIDURAL; INFILTRATION; INTRACAUDAL; PERINEURAL at 14:11

## 2019-03-27 RX ADMIN — SODIUM CHLORIDE, SODIUM LACTATE, POTASSIUM CHLORIDE, AND CALCIUM CHLORIDE: 600; 310; 30; 20 INJECTION, SOLUTION INTRAVENOUS at 14:06

## 2019-03-27 RX ADMIN — PROPOFOL 50 MG: 10 INJECTION, EMULSION INTRAVENOUS at 14:11

## 2019-03-27 RX ADMIN — GLYCOPYRROLATE 0.1 MG: 0.2 INJECTION INTRAMUSCULAR; INTRAVENOUS at 14:11

## 2019-03-27 RX ADMIN — PROPOFOL 10 MG: 10 INJECTION, EMULSION INTRAVENOUS at 14:17

## 2019-03-27 RX ADMIN — PROPOFOL 20 MG: 10 INJECTION, EMULSION INTRAVENOUS at 14:14

## 2019-03-27 RX ADMIN — PROPOFOL 30 MG: 10 INJECTION, EMULSION INTRAVENOUS at 14:16

## 2019-03-27 RX ADMIN — PROPOFOL 50 MG: 10 INJECTION, EMULSION INTRAVENOUS at 14:12

## 2019-03-27 NOTE — ROUTINE PROCESS
Patient taken to recovery by this RN and CRNA, bedside report given to [facility] RN. Patient stable and on monitor.

## 2019-03-27 NOTE — ANESTHESIA POSTPROCEDURE EVALUATION
Procedure(s): 
egd. MAC Anesthesia Post Evaluation Multimodal analgesia: multimodal analgesia not used between 6 hours prior to anesthesia start to PACU discharge Patient location: [facility]. Level of consciousness: awake Pain score: 0 Airway patency: patent Anesthetic complications: no 
Cardiovascular status: stable Respiratory status: room air Hydration status: stable Post anesthesia nausea and vomiting:  none Vitals Value Taken Time /58 3/27/2019  2:28 PM  
Temp Pulse 72 3/27/2019  2:28 PM  
Resp 20 3/27/2019  2:28 PM  
SpO2 99 % 3/27/2019  2:28 PM

## 2019-03-27 NOTE — PROCEDURES
Endoscopy Procedure Note    Patient: Marilyn Degroot MRN: [id number]  SSN: [id number]    YOB: 1934  Age: 80 y.o. Sex: male      Date/Time:  3/27/2019 2:27 PM    Esophagogastroduodenoscopy (EGD) Procedure Note    Procedure: Esophagogastroduodenoscopy with biopsy    IMPRESSION:   1. 3 cm hiatal hernia  2. 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 Scarring in the distal esophagus suggestive of healed esophagitis    RECOMMENDATIONS:  1. Resume regular diet. 2. Continue omeprazole 40mg once daily  3. Await biopsies  4. Follow-up in the office in 6 weeks for repeat labs    Indication: Hematemesis  :  Jo Hodges MD  Assistants: Endoscopy Technician-1: Aleshia Bowers  Endoscopy RN-1: Niko Lopes RN    Referring Provider:   Daksha Kay MD  History: The history and physical exam were reviewed and updated. Endoscope: Olympus GIF-190 diagnostic endoscope  Extent of Exam: third portion of the duodenum  ASA: Per Anesthesia  Anethesia/Sedation:  MAC anesthesia    Description of the procedure: The procedure was discussed with the patient including risks, benefits, alternatives including risks of iv sedation, bleeding, perforation and aspiration. A safety timeout was performed. The patient was placed in the left lateral decubitus position. A bite block was placed. The patient was given incremental doses of intravenous sedation until moderate sedation was achieved. The patients vital signs were monitored at all times including heart rate/rhythm, blood pressure and oxygen saturation. The endoscope was then passed under direct visualization to the third portion of the duodenum. The endoscope was then slowly withdrawn while visualizing the mucosa. In the stomach a retroflexion was performed and gastric fundus and cardia visualized. The endoscope was then slowly withdrawn. The patient was then transferred to recovery in stable condition.                 Findings: Esophagus: There was evidence of scarring in the distal esophagus suggestive of healed esophagitis but no active ulcer or erosion. A 3cm hiatal hernia was noted and tongues of salmon colored mucosa were seen extending into the distal esophagus (Would be C1M2 if Engel's is confirmed). 4-quadrant biopsies were taken for pathology. Stomach: The gastric mucosa was normal with no ulceration, mass, stricture. Duodenum: The duodenum mucosa was normal with no ulceration, mass, stricture and no evidence of villous atrophy. Therapies:  None    Specimens:   ID Type Source Tests Collected by Time Destination   1 : bx distal esophagus r/o barretts Preservative Esophagus, Distal  Steffen Hummel MD 3/27/2019 0277 Pathology               Complications:   None; patient tolerated the procedure well.     EBL:Minimal    Discharge disposition:  Home in the company of  when able to ambulate    Ashish Ballard MD  March 27, 2019  2:27 PM

## 2019-03-27 NOTE — PERIOP NOTES
Phase 2 Recovery Summary Patient arrived to Phase 2 at 1. Report from [facility] Vitals:  
 03/27/19 1225 03/27/19 1229 03/27/19 1428 BP:  148/67 117/58 Pulse:  67 72 Resp:  16 16 Temp:  97.7 °F (36.5 °C) SpO2:  100% 99% Weight: 73 kg (161 lb) Height: 5' 11\" (1.803 m) MD spoke with pt and caregiver. Both verbalized understanding. oriented to time, place, person and situation Lines and Drains Peripherally Inserted Central Catheter: Wound Patient discharged to home with friend.  
 
Kofi Levy RN

## 2019-03-27 NOTE — DISCHARGE INSTRUCTIONS
Patient Discharge Instructions    Mita Miles / [id number] : 1934    Admitted 3/27/2019 Discharged: 3/27/2019         IMPRESSION:   1. 3 cm hiatal hernia  2. 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 Scarring in the distal esophagus suggestive of healed esophagitis    RECOMMENDATIONS:  1. Resume regular diet. 2. Continue omeprazole 40mg once daily  3. Await biopsies  4. Follow-up in the office in 6 weeks for repeat labs  5. Please contact our office if you have not received the results by three    weeks. Recommended Diet: Regular Diet    Recommended Activity:    1. Do not drink alcohol, drive or operate machinery for 12 hours   2. Call if any fever, abdominal pain or bleeding noted.        Signed By: Maria De Jesus Nunez MD     2019

## 2019-03-27 NOTE — ANESTHESIA PREPROCEDURE EVALUATION
Anesthetic History No history of anesthetic complications Review of Systems / Medical History Patient summary reviewed and pertinent labs reviewed Pulmonary Within defined limits Neuro/Psych Within defined limits Cardiovascular Hypertension Exercise tolerance: >4 METS 
  
GI/Hepatic/Renal 
  
 
 
 
PUD Endo/Other Arthritis Other Findings Comments: Documentation of current medication Current medications obtained, documented and obtained? YES Risk Factors for Postoperative nausea/vomiting: 
     History of postoperative nausea/vomiting? NO Female? NO Motion sickness? NO Intended opioid administration for postoperative analgesia? NO Smoking Abstinence: 
Current Smoker? NO Elective Surgery? YES Seen preoperatively by anesthesiologist or proxy prior to day of surgery? YES Pt abstained from smoking 24 hours prior to anesthesia? YES Preventive care/screening for High Blood Pressure: 
Aged 18 years and older: YES Screened for high blood pressure: YES Patients with high blood pressure referred to primary care provider 
 for BP management: YES Physical Exam 
 
Airway Mallampati: II 
TM Distance: 4 - 6 cm Neck ROM: normal range of motion Mouth opening: Normal 
 
 Cardiovascular Rhythm: regular Rate: normal 
 
 
 
 Dental 
No notable dental hx Pulmonary Breath sounds clear to auscultation Abdominal 
GI exam deferred Other Findings Anesthetic Plan ASA: 3 Anesthesia type: MAC Induction: Intravenous Anesthetic plan and risks discussed with: Patient

## 2019-03-27 NOTE — H&P
Date of Surgery Update: 
Jorge L Recinos was seen and examined. History and physical has been reviewed. The patient has been examined.  There have been no significant clinical changes since the completion of the originally dated History and Physical. 
 
Signed By: Sri Olivera MD   
 March 27, 2019 12:59 PM

## 2019-04-15 RX ORDER — OMEPRAZOLE 20 MG/1
CAPSULE, DELAYED RELEASE ORAL
Qty: 30 CAP | Refills: 0 | OUTPATIENT
Start: 2019-04-15

## 2019-04-16 NOTE — TELEPHONE ENCOUNTER
Spoke with patient. He was advised to contact GI for refills of Omeprazole. Contact information provided and Vasyl Hanson at [address] [place] [address] made aware as well. He will contact GI for clarification.

## 2019-07-25 ENCOUNTER — OFFICE VISIT (OUTPATIENT)
Dept: ORTHOPEDIC SURGERY | Age: 84
End: 2019-07-25

## 2019-07-25 VITALS
RESPIRATION RATE: 16 BRPM | WEIGHT: 164.2 LBS | TEMPERATURE: 97.8 F | DIASTOLIC BLOOD PRESSURE: 65 MMHG | SYSTOLIC BLOOD PRESSURE: 151 MMHG | HEIGHT: 71 IN | HEART RATE: 60 BPM | BODY MASS INDEX: 22.99 KG/M2 | OXYGEN SATURATION: 100 %

## 2019-07-25 DIAGNOSIS — M48.062 SPINAL STENOSIS OF LUMBAR REGION WITH NEUROGENIC CLAUDICATION: Primary | ICD-10-CM

## 2019-07-25 RX ORDER — PREGABALIN 75 MG/1
CAPSULE ORAL
Qty: 180 CAP | Refills: 1 | OUTPATIENT
Start: 2019-07-25 | End: 2019-08-15 | Stop reason: DRUGHIGH

## 2019-07-25 NOTE — PROGRESS NOTES
[address]  [address], [address]  [place], [address] [place] [address] Street  Phone: [phone]  Fax: [phone]        [facility]  : 1934  PCP: Maye Baron MD    PROGRESS NOTE      ASSESSMENT AND PLAN    Diagnoses and all orders for this visit:    1. Spinal stenosis of lumbar region with neurogenic claudication  -     pregabalin (LYRICA) 75 mg capsule; Take 1 tab PO BID       1. Advised to continue HEP. Stay hydrated. 2. Advised to see foot doctor for worsening ankle arthritis  3. Continue Lyrica 75mg BID  4. No indications for surgery or spinal injections at this time. 5. Released from specialty care to PCP. May have future fills through PCP if agreeable. 6. Given information on low back exercises and preventing injury    F/U PRN      HISTORY OF PRESENT ILLNESS  Christopher Trujillo is a 80 y.o. male. Pt presents to the office for a 6 monthf/u visit for stenosis. Last OV Lyrica increased to 75mg BID. He affirms benefit with increased Lyrica. Pt notes pain in distal BLE, cramping at night intermittently. He notes some limitations with walking, needs to break. He reports walking normally around house, but using the walker for longer distances. He reports 200ft walking tolerance. Location of pain: low back  Does pain radiate into extremities: R knee pain. BLE cramps distally at night. Does patient have weakness: RLE drags sometimes   Pt denies saddle paresthesias. Medications pt is on: Voltaren gel some benefit on RLE. Lyrica 75mg BID. Tylenol PRN. Denies persistent fevers, chills, weight changes, neurogenic bowel or bladder symptoms. Treatments patient has tried:  Physical therapy:Yes  Doing HEP: Yes  Non-opioid medications: Yes Failed Cymbalta, Gabapentin, Naproxen, tramadol  Spinal injections: Yes  L4-5 RICKY 10/9/18 with 8-10 day benefit. Spinal surgery- No.   Last L MRI 2018: multilevel DDD, mod stenosis L3-4. Foraminal narrowing L4-5, L3-4. Facet arthropathy.  reviewed. Hx of esophageal ulcer, R THR, L hip arthroplasty, R hip replacment. Pt works at [employer] ED 2/28/19 for vomiting and chest pain. 3/27/19 endoscopy. 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 Aurora Trejo Left 6/2015   . MEDICATIONS      Current Outpatient Medications   Medication Sig Dispense Refill    pregabalin (LYRICA) 75 mg capsule Take 1 tab PO  Cap 1    ferrous sulfate 325 mg (65 mg iron) tablet take 1 tablet by mouth once daily 60 Tab 0    Omeprazole delayed release (PRILOSEC D/R) 20 mg tablet Take 1 Tab by mouth daily. 30 Tab 0    amLODIPine (NORVASC) 10 mg tablet Take  by mouth daily.  pregabalin (LYRICA) 75 mg capsule Take 1 tab po BID. Failed Gabapentin, Cymbalta 60 Cap 5    diclofenac (VOLTAREN) 1 % gel Apply  to affected area four (4) times daily. 100 g 4    acetaminophen (TYLENOL) 325 mg tablet Take  by mouth every four (4) hours as needed for Pain.       tamsulosin (FLOMAX) 0.4 mg capsule take 1 capsule by mouth once daily 90 Cap 0        ALLERGIES  No Known Allergies       SOCIAL HISTORY    Social History     Socioeconomic History    Marital status:      Spouse name: Not on file    Number of children: Not on file    Years of education: Not on file    Highest education level: Not on file   Occupational History    Not on file   Social Needs    Financial resource strain: Not on file    Food insecurity:     Worry: Not on file     Inability: Not on file    Transportation needs:     Medical: Not on file     Non-medical: Not on file   Tobacco Use    Smoking status: Former Smoker     Packs/day: 1.00     Years: 25.00     Pack years: 25.00     Last attempt to quit: 1990     Years since quittin.1    Smokeless tobacco: Never Used   Substance and Sexual Activity    Alcohol use: No     Frequency: Never    Drug use: No    Sexual activity: Not Currently     Partners: Female   Lifestyle    Physical activity:     Days per week: Not on file     Minutes per session: Not on file    Stress: Not on file   Relationships    Social connections:     Talks on phone: Not on file     Gets together: Not on file     Attends Holiness service: Not on file     Active member of club or organization: Not on file     Attends meetings of clubs or organizations: Not on file     Relationship status: Not on file    Intimate partner violence:     Fear of current or ex partner: Not on file     Emotionally abused: Not on file     Physically abused: Not on file     Forced sexual activity: Not on file   Other Topics Concern    Not on file   Social History Narrative    Not on file       FAMILY HISTORY    Family History   Problem Relation Age of Onset    Cancer Mother     Heart Attack Daughter        REVIEW OF SYSTEMS  Review of Systems   Constitutional: Negative for chills, fever and weight loss. Respiratory: Negative for shortness of breath. Cardiovascular: Negative for chest pain. Gastrointestinal: Negative for constipation. Negative for fecal incontinence   Genitourinary: Negative for dysuria. Negative for urinary incontinence   Musculoskeletal:        Per HPI   Skin: Negative for rash. Neurological: Negative for dizziness, tingling, tremors, focal weakness and headaches. Endo/Heme/Allergies: Does not bruise/bleed easily. Psychiatric/Behavioral: The patient does not have insomnia. PHYSICAL EXAMINATION  Visit Vitals  /65   Pulse 60   Temp 97.8 °F (36.6 °C) (Oral)   Resp 16   Ht 5' 11\" (1.803 m)   Wt 164 lb 3.2 oz (74.5 kg)   SpO2 100%   BMI 22.90 kg/m²         Accompanied by self. Constitutional:  Well developed, well nourished, in no acute distress. Psychiatric: Affect and mood are appropriate. Integumentary: No rashes or abrasions noted on exposed areas. Cardiovascular/Peripheral Vascular: No peripheral edema is noted BLE. SPINE/MUSCULOSKELETAL EXAM    Lumbar spine:  No rash, ecchymosis, or gross obliquity. No fasciculations. No focal atrophy is noted. Tenderness to palpation none lumbar spine. No tenderness to palpation at the sciatic notch. SI joints non-tender. Trochanters non tender. MOTOR:       Hip Flex  Quads Hamstrings Ankle DF EHL Ankle PF   Right +4/5 +4/5 +4/5 +4/5 +4/5 +4/5   Left +4/5 +4/5 +4/5 +4/5 +4/5 +4/5       Straight Leg raise negative. Ambulation with walker. FWB. FF. Written by Gus Velasco, as dictated by Yulia Mckeon MD.    I, Dr. Yulia Mckeon MD, confirm that all documentation is accurate. Mr. Gricel Guzman may have a reminder for a \"due or due soon\" health maintenance. I have asked that he contact his primary care provider for follow-up on this health maintenance.

## 2019-07-25 NOTE — PATIENT INSTRUCTIONS
Low Back Pain: Exercises  Introduction  Here are some examples of exercises for you to try. The exercises may be suggested for a condition or for rehabilitation. Start each exercise slowly. Ease off the exercises if you start to have pain. You will be told when to start these exercises and which ones will work best for you. How to do the exercises  Press-up    1. Lie on your stomach, supporting your body with your forearms. 2. Press your elbows down into the floor to raise your upper back. As you do this, relax your stomach muscles and allow your back to arch without using your back muscles. As your press up, do not let your hips or pelvis come off the floor. 3. Hold for 15 to 30 seconds, then relax. 4. Repeat 2 to 4 times. Alternate arm and leg (bird dog) exercise    1. Start on the floor, on your hands and knees. 2. Tighten your belly muscles. 3. Raise one leg off the floor, and hold it straight out behind you. Be careful not to let your hip drop down, because that will twist your trunk. 4. Hold for about 6 seconds, then lower your leg and switch to the other leg. 5. Repeat 8 to 12 times on each leg. 6. Over time, work up to holding for 10 to 30 seconds each time. 7. If you feel stable and secure with your leg raised, try raising the opposite arm straight out in front of you at the same time. Knee-to-chest exercise    1. Lie on your back with your knees bent and your feet flat on the floor. 2. Bring one knee to your chest, keeping the other foot flat on the floor (or keeping the other leg straight, whichever feels better on your lower back). 3. Keep your lower back pressed to the floor. Hold for at least 15 to 30 seconds. 4. Relax, and lower the knee to the starting position. 5. Repeat with the other leg. Repeat 2 to 4 times with each leg. 6. To get more stretch, put your other leg flat on the floor while pulling your knee to your chest.    Curl-ups    1.  Lie on the floor on your back with your knees bent at a 90-degree angle. Your feet should be flat on the floor, about 12 inches from your buttocks. 2. Cross your arms over your chest. If this bothers your neck, try putting your hands behind your neck (not your head), with your elbows spread apart. 3. Slowly tighten your belly muscles and raise your shoulder blades off the floor. 4. Keep your head in line with your body, and do not press your chin to your chest.  5. Hold this position for 1 or 2 seconds, then slowly lower yourself back down to the floor. 6. Repeat 8 to 12 times. Pelvic tilt exercise    1. Lie on your back with your knees bent. 2. \"Brace\" your stomach. This means to tighten your muscles by pulling in and imagining your belly button moving toward your spine. You should feel like your back is pressing to the floor and your hips and pelvis are rocking back. 3. Hold for about 6 seconds while you breathe smoothly. 4. Repeat 8 to 12 times. Heel dig bridging    1. Lie on your back with both knees bent and your ankles bent so that only your heels are digging into the floor. Your knees should be bent about 90 degrees. 2. Then push your heels into the floor, squeeze your buttocks, and lift your hips off the floor until your shoulders, hips, and knees are all in a straight line. 3. Hold for about 6 seconds as you continue to breathe normally, and then slowly lower your hips back down to the floor and rest for up to 10 seconds. 4. Do 8 to 12 repetitions. Hamstring stretch in doorway    1. Lie on your back in a doorway, with one leg through the open door. 2. Slide your leg up the wall to straighten your knee. You should feel a gentle stretch down the back of your leg. 3. Hold the stretch for at least 15 to 30 seconds. Do not arch your back, point your toes, or bend either knee. Keep one heel touching the floor and the other heel touching the wall. 4. Repeat with your other leg. 5. Do 2 to 4 times for each leg.     Hip flexor stretch    1. Kneel on the floor with one knee bent and one leg behind you. Place your forward knee over your foot. Keep your other knee touching the floor. 2. Slowly push your hips forward until you feel a stretch in the upper thigh of your rear leg. 3. Hold the stretch for at least 15 to 30 seconds. Repeat with your other leg. 4. Do 2 to 4 times on each side. Wall sit    1. Stand with your back 10 to 12 inches away from a wall. 2. Lean into the wall until your back is flat against it. 3. Slowly slide down until your knees are slightly bent, pressing your lower back into the wall. 4. Hold for about 6 seconds, then slide back up the wall. 5. Repeat 8 to 12 times. Follow-up care is a key part of your treatment and safety. Be sure to make and go to all appointments, and call your doctor if you are having problems. It's also a good idea to know your test results and keep a list of the medicines you take. Where can you learn more? Go to http://em-gus.info/. Enter C379 in the search box to learn more about \"Low Back Pain: Exercises. \"  Current as of: September 20, 2018  Content Version: 12.1  © 0368-0975 Healthwise, Incorporated. Care instructions adapted under license by [employer] (which disclaims liability or warranty for this information). If you have questions about a medical condition or this instruction, always ask your healthcare professional. [place] [address] any warranty or liability for your use of this information. Back Care and Preventing Injuries: Care Instructions  Your Care Instructions    You can hurt your back doing many everyday activities: lifting a heavy box, bending down to garden, exercising at the gym, and even getting out of bed. But you can keep your back strong and healthy by doing some exercises. You also can follow a few tips for sitting, sleeping, and lifting to avoid hurting your back again.   Talk to your doctor before you start an exercise program. Ask for help if you want to learn more about keeping your back healthy. Follow-up care is a key part of your treatment and safety. Be sure to make and go to all appointments, and call your doctor if you are having problems. It's also a good idea to know your test results and keep a list of the medicines you take. How can you care for yourself at home? · Stay at a healthy weight to avoid strain on your lower back. · Do not smoke. Smoking increases the risk of osteoporosis, which weakens the spine. If you need help quitting, talk to your doctor about stop-smoking programs and medicines. These can increase your chances of quitting for good. · Make sure you sleep in a position that maintains your back's normal curves and on a mattress that feels comfortable. Sleep on your side with a pillow between your knees, or sleep on your back with a pillow under your knees. These positions can reduce strain on your back. · When you get out of bed, lie on your side and bend both knees. Drop your feet over the edge of the bed as you push up with both arms. Scoot to the edge of the bed. Make sure your feet are in line with your rear end (buttocks), and then stand up. · If you must stand for a long time, put one foot on a stool, ledge, or box. Exercise to strengthen your back and other muscles  · Get at least 30 minutes of exercise on most days of the week. Walking is a good choice. You also may want to do other activities, such as running, swimming, cycling, or playing tennis or team sports. · Stretch your back muscles. Here are few exercises to try:  ? Lie on your back with your knees bent and your feet flat on the floor. Gently pull one bent knee to your chest. Put that foot back on the floor, and then pull the other knee to your chest. Hold for 15 to 30 seconds. Repeat 2 to 4 times. ? Do pelvic tilts. Lie on your back with your knees bent. Tighten your stomach muscles.  Pull your belly button (navel) in and up toward your ribs. You should feel like your back is pressing to the floor and your hips and pelvis are slightly lifting off the floor. Hold for 6 seconds while breathing smoothly. · Keep your core muscles strong. The muscles of your back, belly (abdomen), and buttocks support your spine. ? Pull in your belly, and imagine pulling your navel toward your spine. Hold this for 6 seconds, then relax. Remember to keep breathing normally as you tense your muscles. ? Do curl-ups. Always do them with your knees bent. Keep your low back on the floor, and curl your shoulders toward your knees using a smooth, slow motion. Keep your arms folded across your chest. If this bothers your neck, try putting your hands behind your neck (not your head), with your elbows spread apart. ? Lie on your back with your knees bent and your feet flat on the floor. Tighten your belly muscles, and then push with your feet and raise your buttocks up a few inches. Hold this position 6 seconds as you continue to breathe normally, then lower yourself slowly to the floor. Repeat 8 to 12 times. ? If you like group exercise, try Pilates or yoga. These classes have poses that strengthen the core muscles. Protect your back when you sit  · Place a small pillow, a rolled-up towel, or a lumbar roll in the curve of your back if you need extra support. · Sit in a chair that is low enough to let you place both feet flat on the floor with both knees nearly level with your hips. If your chair or desk is too high, use a foot rest to raise your knees. · When driving, keep your knees nearly level with your hips. Sit straight, and drive with both hands on the steering wheel. Your arms should be in a slightly bent position. · Try a kneeling chair, which helps tilt your hips forward. This takes pressure off your lower back. · Try sitting on an exercise ball.  It can rock from side to side, which helps keep your back loose.  Lift properly  · Squat down, bending at the hips and knees only. If you need to, put one knee to the floor and extend your other knee in front of you, bent at a right angle (half kneeling). · Press your chest straight forward. This helps keep your upper back straight while keeping a slight arch in your low back. · Hold the load as close to your body as possible, at the level of your navel. · Use your feet to change direction, taking small steps. · Lead with your hips as you change direction. Keep your shoulders in line with your hips as you move. Do not twist your body. · Set down your load carefully, squatting with your knees and hips only. When should you call for help? Watch closely for changes in your health, and be sure to contact your doctor if you have any problems. Where can you learn more? Go to http://em-gus.info/. Enter S810 in the search box to learn more about \"Back Care and Preventing Injuries: Care Instructions. \"  Current as of: September 20, 2018  Content Version: 12.1  © 3486-6726 Healthwise, Incorporated. Care instructions adapted under license by [employer] (which disclaims liability or warranty for this information). If you have questions about a medical condition or this instruction, always ask your healthcare professional. [address] any warranty or liability for your use of this information.

## 2019-08-15 ENCOUNTER — OFFICE VISIT (OUTPATIENT)
Dept: FAMILY MEDICINE CLINIC | Age: 84
End: 2019-08-15

## 2019-08-15 VITALS
WEIGHT: 165 LBS | OXYGEN SATURATION: 98 % | TEMPERATURE: 98.5 F | DIASTOLIC BLOOD PRESSURE: 62 MMHG | SYSTOLIC BLOOD PRESSURE: 128 MMHG | HEART RATE: 67 BPM | HEIGHT: 71 IN | RESPIRATION RATE: 14 BRPM | BODY MASS INDEX: 23.1 KG/M2

## 2019-08-15 VITALS
OXYGEN SATURATION: 98 % | HEIGHT: 71 IN | RESPIRATION RATE: 14 BRPM | HEART RATE: 67 BPM | TEMPERATURE: 98.5 F | BODY MASS INDEX: 23.1 KG/M2 | SYSTOLIC BLOOD PRESSURE: 128 MMHG | WEIGHT: 165 LBS | DIASTOLIC BLOOD PRESSURE: 62 MMHG

## 2019-08-15 DIAGNOSIS — M48.061 SPINAL STENOSIS OF LUMBAR REGION, UNSPECIFIED WHETHER NEUROGENIC CLAUDICATION PRESENT: ICD-10-CM

## 2019-08-15 DIAGNOSIS — Z87.19 HISTORY OF ESOPHAGEAL ULCER: ICD-10-CM

## 2019-08-15 DIAGNOSIS — Z71.89 ADVANCED DIRECTIVES, COUNSELING/DISCUSSION: ICD-10-CM

## 2019-08-15 DIAGNOSIS — Z00.00 INITIAL MEDICARE ANNUAL WELLNESS VISIT: Primary | ICD-10-CM

## 2019-08-15 DIAGNOSIS — N40.0 BENIGN PROSTATIC HYPERPLASIA, UNSPECIFIED WHETHER LOWER URINARY TRACT SYMPTOMS PRESENT: ICD-10-CM

## 2019-08-15 DIAGNOSIS — D64.9 ANEMIA, UNSPECIFIED TYPE: ICD-10-CM

## 2019-08-15 DIAGNOSIS — E53.8 VITAMIN B12 DEFICIENCY: ICD-10-CM

## 2019-08-15 DIAGNOSIS — Z23 ENCOUNTER FOR IMMUNIZATION: ICD-10-CM

## 2019-08-15 DIAGNOSIS — Z13.31 SCREENING FOR DEPRESSION: ICD-10-CM

## 2019-08-15 DIAGNOSIS — R73.01 IFG (IMPAIRED FASTING GLUCOSE): ICD-10-CM

## 2019-08-15 DIAGNOSIS — R79.89 ELEVATED SERUM CREATININE: ICD-10-CM

## 2019-08-15 DIAGNOSIS — I10 ESSENTIAL HYPERTENSION: Primary | ICD-10-CM

## 2019-08-15 LAB — HGB BLD-MCNC: 12.8 G/DL

## 2019-08-15 RX ORDER — LIDOCAINE 50 MG/G
PATCH TOPICAL
Qty: 30 EACH | Refills: 11 | Status: SHIPPED | OUTPATIENT
Start: 2019-08-15 | End: 2020-08-20 | Stop reason: SDUPTHER

## 2019-08-15 RX ORDER — LANOLIN ALCOHOL/MO/W.PET/CERES
1000 CREAM (GRAM) TOPICAL DAILY
COMMUNITY
End: 2020-08-20 | Stop reason: SDUPTHER

## 2019-08-15 RX ORDER — PREGABALIN 100 MG/1
100 CAPSULE ORAL 2 TIMES DAILY
Qty: 60 CAP | Refills: 0 | Status: SHIPPED | OUTPATIENT
Start: 2019-08-15 | End: 2019-09-05 | Stop reason: SDUPTHER

## 2019-08-15 NOTE — PROGRESS NOTES
SUBJECTIVE  Chief Complaint   Patient presents with    Hypertension    Anemia     history of GI ulcer     Patient presents for follow-up. Earlier this year he had a bleeding esophageal ulcer. He has had improvements in his Hg and has had GI follow-up care. In the process he was diagnosed with a B12 deficiency and was started on vit B12 1000 daily along with iron. He has not had any melena or BRBPR. He has completed his PPI. His Cr did elevate a bit during his GI blood loss. He has hypertension. We reviewed their cardiac risk factors. The patient has been taking medications as prescribed regularly and denies any side effects with the medication. The patient denies any chest pain or chest tightness. Denies any dyspnea upon exertion. The patient denies any headaches, blurred vision, or double vision. He has chronic pain from arthritis in his low back and is on Lyrica. He tried Advil with minimal relief. He has failed gabapentin use in the past.  He has not tried lidoderm. He says heat does not help. He has been advised by his spine specialist to follow-up with PCP since he is not a surgical candidate. 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 , Disp: , Rfl:     lidocaine (LIDODERM) 5 %, Apply patch to the affected area for 12 hours a day and remove for 12 hours a day., Disp: 30 Each, Rfl: 11    pregabalin (LYRICA) 100 mg capsule, Take 1 Cap by mouth two (2) times a day. Max Daily Amount: 200 mg., Disp: 60 Cap, Rfl: 0    ferrous sulfate 325 mg (65 mg iron) tablet, take 1 tablet by mouth once daily, Disp: 60 Tab, Rfl: 0    amLODIPine (NORVASC) 10 mg tablet, Take 10 mg by mouth daily. , Disp: , Rfl:     diclofenac (VOLTAREN) 1 % gel, Apply  to affected area four (4) times daily. , Disp: 100 g, Rfl: 4    acetaminophen (TYLENOL) 325 mg tablet, Take  by mouth every four (4) hours as needed for Pain., Disp: , Rfl:     tamsulosin (FLOMAX) 0.4 mg capsule, take 1 capsule by mouth once daily, Disp: 90 Cap, Rfl: 0    No Known Allergies    Past Surgical History:   Procedure Laterality Date    FOOT/TOES SURGERY PROC UNLISTED      HX HIP REPLACEMENT Right     HX OTHER SURGICAL      brain surgery - says he hit his head and then had headaches and had to have surgery,  Dr. Vaughan Pulling Left 2015     Family History   Problem Relation Age of Onset    Cancer Mother     Heart Attack Daughter      Social History     Socioeconomic History    Marital status:      Spouse name: Not on file    Number of children: Not on file    Years of education: Not on file    Highest education level: Not on file   Occupational History    Not on file   Social Needs    Financial resource strain: Not on file    Food insecurity:     Worry: Not on file     Inability: Not on file    Transportation needs:     Medical: Not on file     Non-medical: Not on file   Tobacco Use    Smoking status: Former Smoker     Packs/day: 1.00     Years: 25.00     Pack years: 25.00     Last attempt to quit: 1990     Years since quittin.2    Smokeless tobacco: Never Used   Substance and Sexual Activity    Alcohol use: No     Frequency: Never    Drug use: No    Sexual activity: Not Currently     Partners: Female   Lifestyle    Physical activity:     Days per week: Not on file     Minutes per session: Not on file    Stress: Not on file   Relationships    Social connections:     Talks on phone: Not on file     Gets together: Not on file     Attends Judaism service: Not on file     Active member of club or organization: Not on file     Attends meetings of clubs or organizations: Not on file     Relationship status: Not on file    Intimate partner violence:     Fear of current or ex partner: Not on file     Emotionally abused: Not on file     Physically abused: Not on file     Forced sexual activity: Not on file   Other Topics Concern    Not on file   Social History Narrative    Not on file     OBJECTIVE    Blood pressure 128/62, pulse 67, temperature 98.5 °F (36.9 °C), temperature source Oral, resp. rate 14, height 5' 11\" (1.803 m), weight 165 lb (74.8 kg), SpO2 98 %. General:  Alert, cooperative, well appearing, in no apparent distress. Eyes:  Pupils are equally round and reactive to light with accommodation. The extra-ocular movements are intact. Neck:  There are no carotid bruits. No JVD. CV:  The heart sounds are regular in rate and rhythm. There is a normal S1 and S2. There or no murmurs. Lungs: Inspiratory and expiratory efforts are full and unlabored. Lung sounds are clear and equal to auscultation throughout all lung fields without wheezing, rales, or rhonchi. Abd: soft, nontender, nondistended, no masses. Ext: no swelling. Psych: normal affect. Mood good. Oriented x 3. Judgement and insight intact. ASSESSMENT / PLAN    ICD-10-CM ICD-9-CM    1. Essential hypertension I10 401.9 CBC W/O DIFF      METABOLIC PANEL, COMPREHENSIVE      LIPID PANEL   2. IFG (impaired fasting glucose) R73.01 790.21 HEMOGLOBIN A1C W/O EAG   3. Benign prostatic hyperplasia, unspecified whether lower urinary tract symptoms present N40.0 600.00    4. Anemia, unspecified type D64.9 285.9 AMB POC HEMOGLOBIN (HGB)      FERRITIN   5. History of esophageal ulcer Z87.19 V12.79 AMB POC HEMOGLOBIN (HGB)   6. Vitamin B12 deficiency E53.8 266.2 VITAMIN B12   7. Spinal stenosis of lumbar region, unspecified whether neurogenic claudication present M48.061 724.02 pregabalin (LYRICA) 100 mg capsule   8. Elevated serum creatinine R79.89 790.99      HTN - cont current care. Refills as needed.   Fasting labs ordered, due in 6 months. IFG - check A1c with next set of labs. Last level below 6%. BPH - cont flomax. No need for PSA testing due to age. Cont urology follow-up. Anemia secondary to GI ulcer- cont iron for one more month. Cont vit B12 lifelong. Check vit B12 with next set of labs. Okay to stay off PPI at this point. Spinal stenosis - inc to Lyrica 100mg bid. Trial of lidoderm. RTC in 1 month. Elevated serum Cr - likely due to GI bleed and hypovolemia. Will recheck with next set of labs. He will see the orthopedist tomorrow. Since it has been 1 week, he is ambulating with a walker, and it is not worsening, I will defer to ortho for films, etc.    All chart history elements were reviewed by me at the time of the visit even though marked at time of note closure. Patient understands our medical plan. Patient has provided input and agrees with goals. Alternatives have been explained and offered. All questions answered. The patient is to call if condition worsens or fails to improve. Follow-up and Dispositions    · Return in about 1 month (around 9/15/2019) for back pain/Lyrica. Follow up in 6 months for routine care. Fasting labs due 3-7 days prior to appt.

## 2019-08-15 NOTE — PATIENT INSTRUCTIONS
Medicare Wellness Visit, Male The best way to live healthy is to have a lifestyle where you eat a well-balanced diet, exercise regularly, limit alcohol use, and quit all forms of tobacco/nicotine, if applicable. Regular preventive services are another way to keep healthy. Preventive services (vaccines, screening tests, monitoring & exams) can help personalize your care plan, which helps you manage your own care. Screening tests can find health problems at the earliest stages, when they are easiest to treat. [address] [facility] follows the current, evidence-based guidelines published by the [facility] ([facility]) when recommending preventive services for our patients. Because we follow these guidelines, sometimes recommendations change over time as research supports it. (For example, a prostate screening blood test is no longer routinely recommended for men with no symptoms.) Of course, you and your doctor may decide to screen more often for some diseases, based on your risk and co-morbidities (chronic disease you are already diagnosed with). Preventive services for you include: - Medicare offers their members a free annual wellness visit, which is time for you and your primary care provider to discuss and plan for your preventive service needs. Take advantage of this benefit every year! 
-All adults over age 72 should receive the recommended pneumonia vaccines. Current USPSTF guidelines recommend a series of two vaccines for the best pneumonia protection.  
-All adults should have a flu vaccine yearly and an ECG.  All adults age 61 and older should receive a shingles vaccine once in their lifetime.   
-All adults age 38-68 who are overweight should have a diabetes screening test once every three years.  
-Other screening tests & preventive services for persons with diabetes include: an eye exam to screen for diabetic retinopathy, a kidney function test, a foot exam, and stricter control over your cholesterol.  
-Cardiovascular screening for adults with routine risk involves an electrocardiogram (ECG) at intervals determined by the provider.  
-Colorectal cancer screening should be done for adults age 54-65 with no increased risk factors for colorectal cancer. There are a number of acceptable methods of screening for this type of cancer. Each test has its own benefits and drawbacks. Discuss with your provider what is most appropriate for you during your annual wellness visit. The different tests include: colonoscopy (considered the best screening method), a fecal occult blood test, a fecal DNA test, and sigmoidoscopy. 
-All adults born between [facility] should be screened once for Hepatitis C. 
-An Abdominal Aortic Aneurysm (AAA) Screening is recommended for men age 73-68 who has ever smoked in their lifetime. Here is a list of your current Health Maintenance items (your personalized list of preventive services) with a due date: 
Health Maintenance Due Topic Date Due  Shingles Vaccine (1 of 2) 01/27/1984  Glaucoma Screening   01/27/1999  Pneumococcal Vaccine (1 of 2 - PCV13) 01/27/1999 Pedrito Reagan Annual Well Visit  03/14/2018  Flu Vaccine  08/01/2019

## 2019-08-15 NOTE — PROGRESS NOTES
Chief Complaint   Patient presents with    Annual Wellness Visit     This is an Initial Medicare Annual Wellness Exam (AWV) (Performed 12 months after IPPE or effective date of Medicare Part B enrollment, Once in a lifetime)    I have reviewed the patient's medical history in detail and updated the computerized patient record. 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 Vaughan Pulling Left 2015     Current Outpatient Medications   Medication Sig Dispense Refill    pregabalin (LYRICA) 75 mg capsule Take 1 tab PO  Cap 1    ferrous sulfate 325 mg (65 mg iron) tablet take 1 tablet by mouth once daily 60 Tab 0    Omeprazole delayed release (PRILOSEC D/R) 20 mg tablet Take 1 Tab by mouth daily. 30 Tab 0    amLODIPine (NORVASC) 10 mg tablet Take  by mouth daily.  pregabalin (LYRICA) 75 mg capsule Take 1 tab po BID. Failed Gabapentin, Cymbalta 60 Cap 5    diclofenac (VOLTAREN) 1 % gel Apply  to affected area four (4) times daily. 100 g 4    acetaminophen (TYLENOL) 325 mg tablet Take  by mouth every four (4) hours as needed for Pain.       tamsulosin (FLOMAX) 0.4 mg capsule take 1 capsule by mouth once daily 90 Cap 0     No Known Allergies  Family History   Problem Relation Age of Onset    Cancer Mother     Heart Attack Daughter      Social History     Tobacco Use    Smoking status: Former Smoker     Packs/day: 1.00     Years: 25.00     Pack years: 25.00     Last attempt to quit: 1990     Years since quittin.2    Smokeless tobacco: Never Used   Substance Use Topics    Alcohol use: No     Frequency: Never     Patient Active Problem List   Diagnosis Code    Osteoarthrosis, unspecified whether generalized or localized, pelvic region and thigh M16.9    HTN (hypertension) I10    Retention of urine R33.9    Left hip pain M25.552    Wears glasses Z97.3    Hip arthritis M16.10    Spinal stenosis of lumbar region M48.061    Synovial cyst, RT L4-5 M71.30    Syncope R55    GI bleed K92.2    Anemia D64.9    Esophageal ulcer K22.10    Near syncope R55       Depression Risk Factor Screening:     3 most recent PHQ Screens 7/25/2019   Little interest or pleasure in doing things Not at all   Feeling down, depressed, irritable, or hopeless Not at all   Total Score PHQ 2 0     Alcohol Risk Factor Screening: You do not drink alcohol or very rarely. Functional Ability and Level of Safety:     Hearing Loss  Hearing is good. Activities of Daily Living  The home contains: use of rollator walker  Patient does total self care but does get help as needed from granddaughter he lives with. Fall Risk  Fall Risk Assessment, last 12 mths 7/25/2019   Able to walk? Yes   Fall in past 12 months? No   Fall with injury? -   Number of falls in past 12 months -   Fall Risk Score -       Abuse Screen  Patient is not abused    Cognitive Screening   Evaluation of Cognitive Function:  Has your family/caregiver stated any concerns about your memory: no  Normal    Patient Care Team   Patient Care Team:  Princess Casiano MD as PCP - General (Family Practice)  Chase Perdue MD (Orthopedic Surgery)  Reji Goldstein MD (Physical Medicine and Rehab)  Emma Kessler MD (Gastroenterology)    Assessment/Plan   Education and counseling provided:  Are appropriate based on today's review and evaluation      ICD-10-CM ICD-9-CM    1. Initial Medicare annual wellness visit Z00.00 V70.0    2. Screening for depression Z13.31 V79.0 DEPRESSION SCREEN ANNUAL   3.  Advanced directives, counseling/discussion Z71.89 V65.49 ADVANCE CARE PLANNING FIRST 30 MINS   4. Encounter for immunization Z23 V03.89 PNEUMOCOCCAL CONJ VACCINE 13 VALENT IM      ADMIN PNEUMOCOCCAL VACCINE     Age and sex specific counseling. Screening for depression and alcoholism. Advanced directives / end of life planning discussion and packet provided for review at home. See ACP note. Care team updated. PCV13 administered. Medicare recommended screening and prevention test table is filled out, reviewed, and provided to patient. Scanned to chart as well. Screening and prevention is up to date. Patient understands our medical plan. Patient has provided input and agrees with goals. All questions answered. F/U yearly AWV.

## 2019-08-15 NOTE — PROGRESS NOTES
1. Have you been to the ER, urgent care clinic since your last visit? Hospitalized since your last visit? No    2. Have you seen or consulted any other health care providers outside of the [address] [place] [address] since your last visit? Include any pap smears or colon screening. Yes Where: Dr. Devon Cuellar    3 most recent [facility] 36 Screens 8/15/2019   Little interest or pleasure in doing things Not at all   Feeling down, depressed, irritable, or hopeless Not at all   Total Score PHQ 2 0     Abuse Screening Questionnaire 8/15/2019   Do you ever feel afraid of your partner? N   Are you in a relationship with someone who physically or mentally threatens you? N   Is it safe for you to go home? Y     Fall Risk Assessment, last 12 mths 8/15/2019   Able to walk? Yes   Fall in past 12 months? No   Fall with injury? -   Number of falls in past 12 months -   Fall Risk Score -       Physician order obtained. Patient completed adult immunization consent form. Allergies, contraindications and recommendations reviewed with patient. Prevnar 13 vaccine administered IM right deltoid. Patient tolerated well. Patient remained in office for 15 minutes after injection and no adverse reactions were noted.

## 2019-08-15 NOTE — PROGRESS NOTES
1. Have you been to the ER, urgent care clinic since your last visit? Hospitalized since your last visit? No    2. Have you seen or consulted any other health care providers outside of the [address] [place] [address] since your last visit? Include any pap smears or colon screening.  Yes Where: Dr. Regina Foster (GI)

## 2019-08-15 NOTE — ACP (ADVANCE CARE PLANNING)
Advance Care Planning    Advance Care Planning (ACP) Provider Note - Comprehensive     Date of ACP Conversation: 08/15/19  Persons included in Conversation:  patient  Length of ACP Conversation in minutes:  16 minutes    Authorized Decision Maker (if patient is incapable of making informed decisions): This person is:  Not yet determined. Lives with granddaughter.         General ACP for ALL Patients with Decision Making Capacity:   Importance of advance care planning, including choosing a healthcare agent to communicate patient's healthcare decisions if patient lost the ability to make decisions, such as after a sudden illness or accident  Understanding of the healthcare agent role was assessed and information provided    Review of Existing Advance Directive:  none currently exists    For Serious or Chronic Illness:  No known illness    Interventions Provided:  Recommended completion of Advance Directive form after review of ACP materials and conversation with prospective healthcare agent   Recommended communicating the plan and making copies for the healthcare agent, personal physician, and others as appropriate (e.g., health system)  Recommended review of completed ACP document annually or upon change in health status

## 2019-08-15 NOTE — PATIENT INSTRUCTIONS
Back Pain: Care Instructions Your Care Instructions Back pain has many possible causes. It is often related to problems with muscles and ligaments of the back. It may also be related to problems with the nerves, discs, or bones of the back. Moving, lifting, standing, sitting, or sleeping in an awkward way can strain the back. Sometimes you don't notice the injury until later. Arthritis is another common cause of back pain. Although it may hurt a lot, back pain usually improves on its own within several weeks. Most people recover in 12 weeks or less. Using good home treatment and being careful not to stress your back can help you feel better sooner. Follow-up care is a key part of your treatment and safety. Be sure to make and go to all appointments, and call your doctor if you are having problems. It's also a good idea to know your test results and keep a list of the medicines you take. How can you care for yourself at home? · Sit or lie in positions that are most comfortable and reduce your pain. Try one of these positions when you lie down: ? Lie on your back with your knees bent and supported by large pillows. ? Lie on the floor with your legs on the seat of a sofa or chair. ? Lie on your side with your knees and hips bent and a pillow between your legs. ? Lie on your stomach if it does not make pain worse. · Do not sit up in bed, and avoid soft couches and twisted positions. Bed rest can help relieve pain at first, but it delays healing. Avoid bed rest after the first day of back pain. · Change positions every 30 minutes. If you must sit for long periods of time, take breaks from sitting. Get up and walk around, or lie in a comfortable position. · Try using a heating pad on a low or medium setting for 15 to 20 minutes every 2 or 3 hours. Try a warm shower in place of one session with the heating pad. · You can also try an ice pack for 10 to 15 minutes every 2 to 3 hours. Put a thin cloth between the ice pack and your skin. · Take pain medicines exactly as directed. ? If the doctor gave you a prescription medicine for pain, take it as prescribed. ? If you are not taking a prescription pain medicine, ask your doctor if you can take an over-the-counter medicine. · Take short walks several times a day. You can start with 5 to 10 minutes, 3 or 4 times a day, and work up to longer walks. Walk on level surfaces and avoid hills and stairs until your back is better. · Return to work and other activities as soon as you can. Continued rest without activity is usually not good for your back. · To prevent future back pain, do exercises to stretch and strengthen your back and stomach. Learn how to use good posture, safe lifting techniques, and proper body mechanics. When should you call for help? Call your doctor now or seek immediate medical care if: 
  · You have new or worsening numbness in your legs.  
  · You have new or worsening weakness in your legs. (This could make it hard to stand up.)  
  · You lose control of your bladder or bowels.  
 Watch closely for changes in your health, and be sure to contact your doctor if: 
  · You have a fever, lose weight, or don't feel well.  
  · You do not get better as expected. Where can you learn more? Go to http://em-gus.info/. Enter U163 in the search box to learn more about \"Back Pain: Care Instructions. \" Current as of: September 20, 2018 Content Version: 12.1 © 5377-4473 Healthwise, Incorporated. Care instructions adapted under license by [employer] (which disclaims liability or warranty for this information). If you have questions about a medical condition or this instruction, always ask your healthcare professional. [place] [address] any warranty or liability for your use of this information. Learning About Relief for Back Pain What is back tension and strain? Back strain happens when you overstretch, or pull, a muscle in your back. You may hurt your back in an accident or when you exercise or lift something. Most back pain will get better with rest and time. You can take care of yourself at home to help your back heal. 
What can you do first to relieve back pain? When you first feel back pain, try these steps: 
· Walk. Take a short walk (10 to 20 minutes) on a level surface (no slopes, hills, or stairs) every 2 to 3 hours. Walk only distances you can manage without pain, especially leg pain. · Relax. Find a comfortable position for rest. Some people are comfortable on the floor or a medium-firm bed with a small pillow under their head and another under their knees. Some people prefer to lie on their side with a pillow between their knees. Don't stay in one position for too long. · Try heat or ice. Try using a heating pad on a low or medium setting, or take a warm shower, for 15 to 20 minutes every 2 to 3 hours. Or you can buy single-use heat wraps that last up to 8 hours. You can also try an ice pack for 10 to 15 minutes every 2 to 3 hours. You can use an ice pack or a bag of frozen vegetables wrapped in a thin towel. There is not strong evidence that either heat or ice will help, but you can try them to see if they help. You may also want to try switching between heat and cold. · Take pain medicine exactly as directed. ? If the doctor gave you a prescription medicine for pain, take it as prescribed. ? If you are not taking a prescription pain medicine, ask your doctor if you can take an over-the-counter medicine. What else can you do? · Stretch and exercise. Exercises that increase flexibility may relieve your pain and make it easier for your muscles to keep your spine in a good, neutral position. And don't forget to keep walking. · Do self-massage.  You can use self-massage to unwind after work or school or to energize yourself in the morning. You can easily massage your feet, hands, or neck. Self-massage works best if you are in comfortable clothes and are sitting or lying in a comfortable position. Use oil or lotion to massage bare skin. · Reduce stress. Back pain can lead to a vicious [place]: Distress about the pain tenses the muscles in your back, which in turn causes more pain. Learn how to relax your mind and your muscles to lower your stress. Where can you learn more? Go to http://em-gus.info/. Enter D467 in the search box to learn more about \"Learning About Relief for Back Pain. \" Current as of: September 20, 2018 Content Version: 12.1 © 0465-0219 Healthwise, Incorporated. Care instructions adapted under license by [employer] (which disclaims liability or warranty for this information). If you have questions about a medical condition or this instruction, always ask your healthcare professional. [place] [address] any warranty or liability for your use of this information.

## 2019-08-16 ENCOUNTER — TELEPHONE (OUTPATIENT)
Dept: FAMILY MEDICINE CLINIC | Age: 84
End: 2019-08-16

## 2019-08-16 NOTE — TELEPHONE ENCOUNTER
Fax received from [address] [place] [address] meds stating prior auth is required for the lidocaine (LIDODERM) 5 %     . Submit a PA request  1. Go to key. [employer] and click \"Enter a Key\"  2. Patient last name: Gamaliel Barrientos       : 1934      Key: [id number]  3.  Click \"start a PA\", complete the form, and \"send to plan\"

## 2019-08-16 NOTE — TELEPHONE ENCOUNTER
Patient advised that Lidocaine patches have been denied. Patient advised to start increase dose of Lyrica that prescribed by Dr. Richa Rios to see if that will help with his pain and follow up 9/19/19. Patient voices understanding.

## 2019-09-05 DIAGNOSIS — M48.061 SPINAL STENOSIS OF LUMBAR REGION, UNSPECIFIED WHETHER NEUROGENIC CLAUDICATION PRESENT: ICD-10-CM

## 2019-09-05 NOTE — TELEPHONE ENCOUNTER
This pharmacy faxed over request for the following prescriptions to be filled:    Medication requested :   Requested Prescriptions     Pending Prescriptions Disp Refills    pregabalin (LYRICA) 100 mg capsule 60 Cap 0     Sig: Take 1 Cap by mouth two (2) times a day. Max Daily Amount: 200 mg.      PANTOPRAZOLE 40 MG TABLET    PCP: [facility] [address] or Print: [facility]  Mail order or [address] [phone]    Scheduled appointment if not seen by current providers in office: LOV 8/15/2019, F/U 9/19/2019

## 2019-09-09 RX ORDER — PANTOPRAZOLE SODIUM 40 MG/1
40 TABLET, DELAYED RELEASE ORAL DAILY
Qty: 90 TAB | Refills: 0 | Status: SHIPPED | OUTPATIENT
Start: 2019-09-09 | End: 2019-12-04 | Stop reason: SDUPTHER

## 2019-09-09 RX ORDER — PREGABALIN 100 MG/1
100 CAPSULE ORAL 2 TIMES DAILY
Qty: 180 CAP | Refills: 0 | Status: SHIPPED | OUTPATIENT
Start: 2019-09-09 | End: 2019-09-19 | Stop reason: SDUPTHER

## 2019-09-09 NOTE — TELEPHONE ENCOUNTER
Spoke with patient. He was asked about Pantoprazole but he has difficulty with his memory and was unable to provide information. Spoke with Zachery at [address] She states that patient was last seen on 8/2/19 by Dr. Kimani Dwyer. The patient is currently on Pantoprazole 40 mg every day. At his last visit no refills were requested for Pantoprazole.

## 2019-09-09 NOTE — TELEPHONE ENCOUNTER
Nurse to discuss Pantoprazole with patient. He reported at his last appt that he had stopped this. Is he taking it? I want to know if he has GI follow-up? If so, he should as them if he needs to be on this. If not, then I would advise he takes it once daily in the morning for now and we can discuss this further at his appt.

## 2019-09-19 ENCOUNTER — OFFICE VISIT (OUTPATIENT)
Dept: FAMILY MEDICINE CLINIC | Age: 84
End: 2019-09-19

## 2019-09-19 VITALS
HEART RATE: 76 BPM | TEMPERATURE: 98.1 F | WEIGHT: 165 LBS | RESPIRATION RATE: 14 BRPM | OXYGEN SATURATION: 99 % | SYSTOLIC BLOOD PRESSURE: 130 MMHG | DIASTOLIC BLOOD PRESSURE: 74 MMHG | BODY MASS INDEX: 23.1 KG/M2 | HEIGHT: 71 IN

## 2019-09-19 DIAGNOSIS — Z23 ENCOUNTER FOR IMMUNIZATION: ICD-10-CM

## 2019-09-19 DIAGNOSIS — I10 ESSENTIAL HYPERTENSION: ICD-10-CM

## 2019-09-19 DIAGNOSIS — Z87.19 HISTORY OF ESOPHAGEAL ULCER: ICD-10-CM

## 2019-09-19 DIAGNOSIS — M48.061 SPINAL STENOSIS OF LUMBAR REGION, UNSPECIFIED WHETHER NEUROGENIC CLAUDICATION PRESENT: Primary | ICD-10-CM

## 2019-09-19 DIAGNOSIS — D64.9 ANEMIA, UNSPECIFIED TYPE: ICD-10-CM

## 2019-09-19 RX ORDER — PREGABALIN 100 MG/1
100 CAPSULE ORAL 2 TIMES DAILY
Qty: 60 CAP | Refills: 4 | Status: SHIPPED | OUTPATIENT
Start: 2019-09-19 | End: 2020-02-14 | Stop reason: SDUPTHER

## 2019-09-19 NOTE — PROGRESS NOTES
SUBJECTIVE  Chief Complaint   Patient presents with    Back Pain     low back pain follow up      Patient presents for follow-up. He brings meds in for med rec. He is no longer on norvasc. He has both the lyrica 75mg and 100mg capsules and reports he is taking both twice daily. No improvement in back pain. He was only supposed to be on 100mg capsules. He lives with his granddaughter. He has been advised by his spine specialist to follow-up with PCP since he is not a surgical candidate. Earlier this year he had a bleeding esophageal ulcer. He has had improvements in his Hg and has had GI follow-up care. We have not received notes. In the process he was diagnosed with a B12 deficiency and was started on vit B12 1000 daily along with iron, both of which he is taking. He has not had any melena or BRBPR. He reported he was off his PPI at the last visit but we were asked to refill it 10 days ago. He is back on. The patient denies any chest pain or chest tightness. Denies any dyspnea upon exertion. The patient denies any headaches, blurred vision, or double vision. Past Medical History:   Diagnosis Date    Arthritis     Chronic pain     left hip and lower back    Hypertension     Left hip pain     resolved since surgery    [address] ([facility] [address] [facility] 1975    brain, [facility] Wears glasses        Current Outpatient Medications:     pregabalin (LYRICA) 100 mg capsule, Take 1 Cap by mouth two (2) times a day. Max Daily Amount: 200 mg. Dx: spinal stenosis, Disp: 60 Cap, Rfl: 4    pantoprazole (PROTONIX) 40 mg tablet, Take 1 Tab by mouth daily. , Disp: 90 Tab, Rfl: 0    tamsulosin (FLOMAX) 0.4 mg capsule, Take 1 Cap by mouth daily (after dinner) for 90 days. Indications: enlarged prostate with urination problem, Disp: 90 Cap, Rfl: 3    cyanocobalamin (VITAMIN B-12) 1,000 mcg tablet, Take 1,000 mcg by mouth daily. , Disp: , Rfl:     ferrous sulfate 325 mg (65 mg iron) tablet, take 1 tablet by mouth once daily, Disp: 60 Tab, Rfl: 0    diclofenac (VOLTAREN) 1 % gel, Apply  to affected area four (4) times daily. , Disp: 100 g, Rfl: 4    tamsulosin (FLOMAX) 0.4 mg capsule, take 1 capsule by mouth once daily, Disp: 90 Cap, Rfl: 0    lidocaine (LIDODERM) 5 %, Apply patch to the affected area for 12 hours a day and remove for 12 hours a day., Disp: 30 Each, Rfl: 11    No Known Allergies    Past Surgical History:   Procedure Laterality Date    FOOT/TOES SURGERY PROC UNLISTED      HX HIP REPLACEMENT Right     HX OTHER SURGICAL      brain surgery - says he hit his head and then had headaches and had to have surgery,  Dr. Angelina Goldstein Left 2015     Family History   Problem Relation Age of Onset    Cancer Mother     Heart Attack Daughter      Social History     Socioeconomic History    Marital status:      Spouse name: Not on file    Number of children: Not on file    Years of education: Not on file    Highest education level: Not on file   Occupational History    Not on file   Social Needs    Financial resource strain: Not on file    Food insecurity:     Worry: Not on file     Inability: Not on file    Transportation needs:     Medical: Not on file     Non-medical: Not on file   Tobacco Use    Smoking status: Former Smoker     Packs/day: 1.00     Years: 25.00     Pack years: 25.00     Last attempt to quit: 1990     Years since quittin.3    Smokeless tobacco: Never Used   Substance and Sexual Activity    Alcohol use: No     Frequency: Never    Drug use: No    Sexual activity: Not Currently     Partners: Female   Lifestyle    Physical activity:     Days per week: Not on file     Minutes per session: Not on file    Stress: Not on file   Relationships    Social connections:     Talks on phone: Not on file     Gets together: Not on file     Attends [employer] service: Not on file     Active member of club or organization: Not on file     Attends meetings of clubs or organizations: Not on file     Relationship status: Not on file    Intimate partner violence:     Fear of current or ex partner: Not on file     Emotionally abused: Not on file     Physically abused: Not on file     Forced sexual activity: Not on file   Other Topics Concern    Not on file   Social History Narrative    Not on file     OBJECTIVE    Blood pressure 130/74, pulse 76, temperature 98.1 °F (36.7 °C), temperature source Oral, resp. rate 14, height 5' 11\" (1.803 m), weight 165 lb (74.8 kg), SpO2 99 %. General:  Alert, cooperative, well appearing, in no apparent distress. CV:  The heart sounds are regular in rate and rhythm. There is a normal S1 and S2. There or no murmurs. Lungs: Inspiratory and expiratory efforts are full and unlabored. Lung sounds are clear and equal to auscultation throughout all lung fields without wheezing, rales, or rhonchi. Abd: soft, nontender. No masses. Ext: no swelling. Back:  restricted ROM due to stiffness globally. Nontender. Neuro:  No deficits. Uses walker to ambulate. Psych: normal affect. Mood good. Oriented x 3. Judgement and insight intact. ASSESSMENT / PLAN    ICD-10-CM ICD-9-CM    1. Spinal stenosis of lumbar region, unspecified whether neurogenic claudication present M48.061 724.02 pregabalin (LYRICA) 100 mg capsule   2. History of esophageal ulcer Z87.19 V12.79    3. Anemia, unspecified type D64.9 285.9    4. Essential hypertension I10 401.9    5. Encounter for immunization Z23 V03.89 ADMIN INFLUENZA VIRUS VAC      INFLUENZA VACCINE INACTIVATED (IIV), SUBUNIT, ADJUVANTED, IM     Spinal stenosis - Patient to stay on Lyrica 100mg bid. As needed lidoderm. Anemia secondary to GI ulcer- cont iron for now. Cont vit B12 lifelong. Check vit B12 with next set of labs. Requesting updated GI notes. Cont PPI. HTN - He is no longer on his norvasc and normotensive. We will follow as is. Flu vaccine administered.     All chart history elements were reviewed by me at the time of the visit even though marked at time of note closure. Patient understands our medical plan. Patient has provided input and agrees with goals. Alternatives have been explained and offered. All questions answered. The patient is to call if condition worsens or fails to improve. Follow-up and Dispositions    · Return Thursday, February 20, 2020 08:30 AM for routine care (already scheduled).

## 2019-09-19 NOTE — PATIENT INSTRUCTIONS
Lumbar Spinal Stenosis: Care Instructions  Your Care Instructions    Stenosis in the spine is a narrowing of the canal that is around the spinal cord and nerve roots in your back. It can happen as part of aging. Sometimes bone and other tissue grow into this canal and press on the nerves that branch out from the spinal cord. This can cause pain, numbness, and weakness. When it happens in the lower part of your back, it is called lumbar spinal stenosis. It can cause problems in the legs, feet, and rear end (buttocks). You may be able to get relief from the symptoms of spinal stenosis by taking pain medicine. Your doctor may suggest physical therapy and exercises to keep your spine strong and flexible. Some people try steroid shots to reduce swelling. If pain and numbness in your legs are still so bad that you cannot do your normal activities, you may need surgery. Follow-up care is a key part of your treatment and safety. Be sure to make and go to all appointments, and call your doctor if you are having problems. It's also a good idea to know your test results and keep a list of the medicines you take. How can you care for yourself at home? · Take an over-the-counter pain medicine. Nonsteroidal anti-inflammatory drugs (NSAIDs) such as ibuprofen or naproxen seem to work best. But if you can't take NSAIDs, you can try acetaminophen. Be safe with medicines. Read and follow all instructions on the label. · Do not take two or more pain medicines at the same time unless the doctor told you to. Many pain medicines have acetaminophen, which is Tylenol. Too much acetaminophen (Tylenol) can be harmful. · Stay at a healthy weight. Being overweight puts extra strain on your spine. · Change positions often when you sit or stand. This can ease pain. It may also reduce pressure on the spinal cord and its nerves. · Avoid doing things that make your symptoms worse.  Walking downhill and standing for a long time may cause pain.  · Stretch and strengthen your back muscles as your doctor or physical therapist recommends. If your doctor says it is okay to do them, these exercises may help. ? Lie on your back with your knees bent. Gently pull one bent knee to your chest. Put that foot back on the floor, and then pull the other knee to your chest.  ? Do pelvic tilts. Lie on your back with your knees bent. Tighten your stomach muscles. Pull your belly button (navel) in and up toward your ribs. You should feel like your back is pressing to the floor and your hips and pelvis are slightly lifting off the floor. Hold for 6 seconds while breathing smoothly. ? Stand with your back flat against a wall. Slowly slide down until your knees are slightly bent. Hold for 10 seconds, then slide back up the wall. · Remove or change anything in your house that may cause you to fall. Keep walkways clear of clutter, electrical cords, and throw rugs. When should you call for help? Call 911 anytime you think you may need emergency care. For example, call if:    · You are unable to move a leg at all.   [facility] your doctor now or seek immediate medical care if:    · You have new or worse symptoms in your legs, belly, or buttocks. Symptoms may include:  ? Numbness or tingling. ? Weakness. ? Pain.     · You lose bladder or bowel control.    Watch closely for changes in your health, and be sure to contact your doctor if:    · You have a fever, lose weight, or don't feel well.     · You are not getting better as expected. Where can you learn more? Go to http://em-gus.info/. Pati Lanier in the search box to learn more about \"Lumbar Spinal Stenosis: Care Instructions. \"  Current as of: September 20, 2018  Content Version: 12.1  © 7602-7416 [employer]. Care instructions adapted under license by [employer] (which disclaims liability or warranty for this information).  If you have questions about a medical condition or this instruction, always ask your healthcare professional. [place] [address] any warranty or liability for your use of this information.

## 2019-12-04 RX ORDER — PANTOPRAZOLE SODIUM 40 MG/1
TABLET, DELAYED RELEASE ORAL
Qty: 90 TAB | Refills: 0 | Status: SHIPPED | OUTPATIENT
Start: 2019-12-04 | End: 2020-02-14 | Stop reason: SDUPTHER

## 2020-02-14 ENCOUNTER — OFFICE VISIT (OUTPATIENT)
Dept: FAMILY MEDICINE CLINIC | Age: 85
End: 2020-02-14

## 2020-02-14 ENCOUNTER — HOSPITAL ENCOUNTER (OUTPATIENT)
Dept: LAB | Age: 85
Discharge: HOME | End: 2020-02-14

## 2020-02-14 VITALS
OXYGEN SATURATION: 98 % | BODY MASS INDEX: 23.8 KG/M2 | WEIGHT: 170 LBS | HEART RATE: 87 BPM | SYSTOLIC BLOOD PRESSURE: 138 MMHG | TEMPERATURE: 97.9 F | RESPIRATION RATE: 16 BRPM | HEIGHT: 71 IN | DIASTOLIC BLOOD PRESSURE: 80 MMHG

## 2020-02-14 DIAGNOSIS — M48.061 SPINAL STENOSIS OF LUMBAR REGION, UNSPECIFIED WHETHER NEUROGENIC CLAUDICATION PRESENT: ICD-10-CM

## 2020-02-14 DIAGNOSIS — D64.9 ANEMIA, UNSPECIFIED TYPE: ICD-10-CM

## 2020-02-14 DIAGNOSIS — M20.60 ACQUIRED DEFORMITY OF TOE, UNSPECIFIED LATERALITY: ICD-10-CM

## 2020-02-14 DIAGNOSIS — Z87.19 HISTORY OF ESOPHAGEAL ULCER: ICD-10-CM

## 2020-02-14 DIAGNOSIS — L60.8 TOENAIL DEFORMITY: ICD-10-CM

## 2020-02-14 DIAGNOSIS — E53.8 VITAMIN B12 DEFICIENCY: ICD-10-CM

## 2020-02-14 DIAGNOSIS — I10 ESSENTIAL HYPERTENSION: Primary | ICD-10-CM

## 2020-02-14 LAB — XX-LABCORP SPECIMEN COL,LCBCF: NORMAL

## 2020-02-14 PROCEDURE — 99001 SPECIMEN HANDLING PT-LAB: CPT

## 2020-02-14 RX ORDER — LANOLIN ALCOHOL/MO/W.PET/CERES
CREAM (GRAM) TOPICAL
Qty: 90 TAB | Refills: 0 | Status: SHIPPED | OUTPATIENT
Start: 2020-02-14 | End: 2020-04-28

## 2020-02-14 RX ORDER — PREGABALIN 100 MG/1
100 CAPSULE ORAL 2 TIMES DAILY
Qty: 180 CAP | Refills: 1 | Status: SHIPPED | OUTPATIENT
Start: 2020-02-14 | End: 2020-08-20 | Stop reason: SDUPTHER

## 2020-02-14 RX ORDER — PANTOPRAZOLE SODIUM 40 MG/1
TABLET, DELAYED RELEASE ORAL
Qty: 90 TAB | Refills: 3 | Status: SHIPPED | OUTPATIENT
Start: 2020-02-14 | End: 2021-02-25 | Stop reason: SDUPTHER

## 2020-02-14 NOTE — PATIENT INSTRUCTIONS
A Healthy Lifestyle: Care Instructions Your Care Instructions A healthy lifestyle can help you feel good, stay at a healthy weight, and have plenty of energy for both work and play. A healthy lifestyle is something you can share with your whole family. A healthy lifestyle also can lower your risk for serious health problems, such as high blood pressure, heart disease, and diabetes. You can follow a few steps listed below to improve your health and the health of your family. Follow-up care is a key part of your treatment and safety. Be sure to make and go to all appointments, and call your doctor if you are having problems. It's also a good idea to know your test results and keep a list of the medicines you take. How can you care for yourself at home? · Do not eat too much sugar, fat, or fast foods. You can still have dessert and treats now and then. The goal is moderation. · Start small to improve your eating habits. Pay attention to portion sizes, drink less juice and soda pop, and eat more fruits and vegetables. ? Eat a healthy amount of food. A 3-ounce serving of meat, for example, is about the size of a deck of cards. Fill the rest of your plate with vegetables and whole grains. ? Limit the amount of soda and sports drinks you have every day. Drink more water when you are thirsty. ? Eat at least 5 servings of fruits and vegetables every day. It may seem like a lot, but it is not hard to reach this goal. A serving or helping is 1 piece of fruit, 1 cup of vegetables, or 2 cups of leafy, raw vegetables. Have an apple or some carrot sticks as an afternoon snack instead of a candy bar. Try to have fruits and/or vegetables at every meal. 
· Make exercise part of your daily routine. You may want to start with simple activities, such as walking, bicycling, or slow swimming. Try to be active 30 to 60 minutes every day.  You do not need to do all 30 to 60 minutes all at once. For example, you can exercise 3 times a day for 10 or 20 minutes. Moderate exercise is safe for most people, but it is always a good idea to talk to your doctor before starting an exercise program. 
· Keep moving. Julieta Pages the lawn, work in the garden, or [email or web address]. Take the stairs instead of the elevator at work. · If you smoke, quit. People who smoke have an increased risk for heart attack, stroke, cancer, and other lung illnesses. Quitting is hard, but there are ways to boost your chance of quitting tobacco for good. ? Use nicotine gum, patches, or lozenges. ? Ask your doctor about stop-smoking programs and medicines. ? Keep trying. In addition to reducing your risk of diseases in the future, you will notice some benefits soon after you stop using tobacco. If you have shortness of breath or asthma symptoms, they will likely get better within a few weeks after you quit. · Limit how much alcohol you drink. Moderate amounts of alcohol (up to 2 drinks a day for men, 1 drink a day for women) are okay. But drinking too much can lead to liver problems, high blood pressure, and other health problems. Family health If you have a family, there are many things you can do together to improve your health. · Eat meals together as a family as often as possible. · Eat healthy foods. This includes fruits, vegetables, lean meats and dairy, and whole grains. · Include your family in your fitness plan. Most people think of activities such as jogging or tennis as the way to fitness, but there are many ways you and your family can be more active. Anything that makes you breathe hard and gets your heart pumping is exercise. Here are some tips: 
? Walk to do errands or to take your child to school or the bus. 
? Go for a family bike ride after dinner instead of watching TV. Where can you learn more? Go to http://em-gus.info/. Enter T270 in the search box to learn more about \"A Healthy Lifestyle: Care Instructions. \" Current as of: May 28, 2019 Content Version: 12.2 © 8360-3073 [employer]. Care instructions adapted under license by [employer] (which disclaims liability or warranty for this information). If you have questions about a medical condition or this instruction, always ask your healthcare professional. [place] [address] any warranty or liability for your use of this information.

## 2020-02-14 NOTE — PROGRESS NOTES
1. Have you been to the ER, urgent care clinic since your last visit? Hospitalized since your last visit? No    2. Have you seen or consulted any other health care providers outside of the [address] [place] [address] since your last visit? Include any pap smears or colon screening.  No    Eye exam: not UTD

## 2020-02-14 NOTE — PROGRESS NOTES
SUBJECTIVE  Chief Complaint   Patient presents with    Hypertension    Other     IFG/prediabetes    Benign Prostatic Hypertrophy    Abdominal Aortic Aneurysm    Vitamin B12 Deficiency    Other     elevated Cr    Toe Pain     toes on left foot x 2-3 weeks     Patient presents for follow-up. He has a history of HTN. He is not on norvasc. The patient denies any chest pain or chest tightness. Denies any dyspnea upon exertion. The patient denies any headaches, blurred vision, or double vision. He has lyrica 100mg capsules and reports he is taking both twice daily. He lives with his granddaughter. He has been advised by his spine specialist to follow-up with PCP since he is not a surgical candidate. He has a history of a bleeding esophageal ulcer. He has had improvements in his Hg and has had GI follow-up care. In the process he was diagnosed with a B12 deficiency and was started on vit B12 1000 daily along with iron, both of which he is taking. He has not had any melena or BRBPR. He is on PPI therapy. 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 Max Daily Amount: 200 mg. Dx: spinal stenosis, Disp: 180 Cap, Rfl: 1    ferrous sulfate 325 mg (65 mg iron) tablet, take 1 tablet by mouth once daily, Disp: 90 Tab, Rfl: 0    cyanocobalamin (VITAMIN B-12) 1,000 mcg tablet, Take 1,000 mcg by mouth daily. , Disp: , Rfl:     lidocaine (LIDODERM) 5 %, Apply patch to the affected area for 12 hours a day and remove for 12 hours a day., Disp: 30 Each, Rfl: 11    diclofenac (VOLTAREN) 1 % gel, Apply  to affected area four (4) times daily. , Disp: 100 g, Rfl: 4    tamsulosin (FLOMAX) 0.4 mg capsule, take 1 capsule by mouth once daily, Disp: 90 Cap, Rfl: 0    No Known Allergies    Past Surgical History:   Procedure Laterality Date    FOOT/TOES SURGERY PROC UNLISTED      HX HIP REPLACEMENT Right     HX OTHER SURGICAL      brain surgery - says he hit his head and then had headaches and had to have surgery,  Dr. Poonam Riddle Left 2015     Family History   Problem Relation Age of Onset    Cancer Mother     Heart Attack Daughter      Social History     Socioeconomic History    Marital status:      Spouse name: Not on file    Number of children: Not on file    Years of education: Not on file    Highest education level: Not on file   Occupational History    Not on file   Social Needs    Financial resource strain: Not on file    Food insecurity:     Worry: Not on file     Inability: Not on file    Transportation needs:     Medical: Not on file     Non-medical: Not on file   Tobacco Use    Smoking status: Former Smoker     Packs/day: 1.00     Years: 25.00     Pack years: 25.00     Last attempt to quit: 1990     Years since quittin.7    Smokeless tobacco: Never Used   Substance and Sexual Activity    Alcohol use: No     Frequency: Never    Drug use: No    Sexual activity: Not Currently     Partners: Female   Lifestyle    Physical activity:     Days per week: Not on file     Minutes per session: Not on file    Stress: Not on file   Relationships    Social connections:     Talks on phone: Not on file     Gets together: Not on file     Attends Bahai service: Not on file     Active member of club or organization: Not on file     Attends meetings of clubs or organizations: Not on file     Relationship status: Not on file    Intimate partner violence:     Fear of current or ex partner: Not on file     Emotionally abused: Not on file     Physically abused: Not on file     Forced sexual activity: Not on file   Other Topics Concern    Not on file   Social History Narrative    Not on file     OBJECTIVE    Blood pressure 138/80, pulse 87, temperature 97.9 °F (36.6 °C), temperature source Oral, resp. rate 16, height 5' 11\" (1.803 m), weight 170 lb (77.1 kg), SpO2 98 %. General:  Alert, cooperative, well appearing, in no apparent distress. CV:  The heart sounds are regular in rate and rhythm. There is a normal S1 and S2. There or no murmurs. Lungs: Inspiratory and expiratory efforts are full and unlabored. Lung sounds are clear and equal to auscultation throughout all lung fields without wheezing, rales, or rhonchi. Ext: no swelling. Back:  restricted ROM due to stiffness globally. Nontender. Feet:  Bilateral bunion deformity. Thick deformed hyperpigmented nails on both feet. Neuro:  No deficits. Uses walker to ambulate. Psych: normal affect. Mood good. Oriented x 3. Judgement and insight intact. ASSESSMENT / PLAN    ICD-10-CM ICD-9-CM    1. Essential hypertension I10 401.9 CBC WITH AUTOMATED DIFF      METABOLIC PANEL, BASIC   2. Spinal stenosis of lumbar region, unspecified whether neurogenic claudication present M48.061 724.02 pregabalin (LYRICA) 100 mg capsule   3. Anemia, unspecified type D64.9 285.9 ferrous sulfate 325 mg (65 mg iron) tablet      FERRITIN   4. History of esophageal ulcer Z87.19 V12.79 pantoprazole (PROTONIX) 40 mg tablet   5. Vitamin B12 deficiency E53.8 266.2 VITAMIN B12   6. Acquired deformity of toe, unspecified laterality M20.60 735.9 REFERRAL TO PODIATRY   7. Toenail deformity L60.8 703.9 REFERRAL TO PODIATRY     HTN - He is no longer on his norvasc and normotensive. We will follow as is. Spinal stenosis - Patient to stay on Lyrica 100mg bid. As needed lidoderm. Anemia secondary to GI ulcer- cont iron for now. Cont vit B12 lifelong. Check vit B12 and ferritin. Cont PPI. B12 def - check levels. Cont B12.     Podiatry referral for foot issues / nails. All chart history elements were reviewed by me at the time of the visit even though marked at time of note closure. Patient understands our medical plan. Patient has provided input and agrees with goals. Alternatives have been explained and offered. All questions answered. The patient is to call if condition worsens or fails to improve. Follow-up and Dispositions    · Return in about 6 months (around 8/14/2020) for routine care. Non-fasting labs due 3-7 days prior to appointment.

## 2020-02-15 LAB
ALBUMIN SERPL-MCNC: 3.9 G/DL (ref 3.6–4.6)
ALBUMIN/GLOB SERPL: 1.4 {RATIO} (ref 1.2–2.2)
ALP SERPL-CCNC: 85 IU/L (ref 39–117)
ALT SERPL-CCNC: 15 IU/L (ref 0–44)
AST SERPL-CCNC: 17 IU/L (ref 0–40)
BILIRUB SERPL-MCNC: 0.4 MG/DL (ref 0–1.2)
BUN SERPL-MCNC: 18 MG/DL (ref 8–27)
BUN/CREAT SERPL: 14 (ref 10–24)
CALCIUM SERPL-MCNC: 9.1 MG/DL (ref 8.6–10.2)
CHLORIDE SERPL-SCNC: 104 MMOL/L (ref 96–106)
CHOLEST SERPL-MCNC: 182 MG/DL (ref 100–199)
CO2 SERPL-SCNC: 24 MMOL/L (ref 20–29)
CREAT SERPL-MCNC: 1.27 MG/DL (ref 0.76–1.27)
ERYTHROCYTE [DISTWIDTH] IN BLOOD BY AUTOMATED COUNT: 13.4 % (ref 11.6–15.4)
FERRITIN SERPL-MCNC: 140 NG/ML (ref 30–400)
GLOBULIN SER CALC-MCNC: 2.8 G/DL (ref 1.5–4.5)
GLUCOSE SERPL-MCNC: 87 MG/DL (ref 65–99)
HBA1C MFR BLD: 6 % (ref 4.8–5.6)
HCT VFR BLD AUTO: 35.8 % (ref 37.5–51)
HDLC SERPL-MCNC: 63 MG/DL
HGB BLD-MCNC: 11.9 G/DL (ref 13–17.7)
INTERPRETATION, 910389: NORMAL
INTERPRETATION: NORMAL
LDLC SERPL CALC-MCNC: 102 MG/DL (ref 0–99)
MCH RBC QN AUTO: 31 PG (ref 26.6–33)
MCHC RBC AUTO-ENTMCNC: 33.2 G/DL (ref 31.5–35.7)
MCV RBC AUTO: 93 FL (ref 79–97)
PDF IMAGE, 910387: NORMAL
PLATELET # BLD AUTO: 200 X10E3/UL (ref 150–450)
POTASSIUM SERPL-SCNC: 4.5 MMOL/L (ref 3.5–5.2)
PROT SERPL-MCNC: 6.7 G/DL (ref 6–8.5)
RBC # BLD AUTO: 3.84 X10E6/UL (ref 4.14–5.8)
SODIUM SERPL-SCNC: 141 MMOL/L (ref 134–144)
SPECIMEN STATUS REPORT, ROLRST: NORMAL
TRIGL SERPL-MCNC: 87 MG/DL (ref 0–149)
VIT B12 SERPL-MCNC: 537 PG/ML (ref 232–1245)
VLDLC SERPL CALC-MCNC: 17 MG/DL (ref 5–40)
WBC # BLD AUTO: 5.1 X10E3/UL (ref 3.4–10.8)

## 2020-02-15 NOTE — PROGRESS NOTES
Nurse to advise to continue iron. Labs overall look good. He does have mildly elevated blood sugar but it is not of major concern at this time.

## 2020-02-17 NOTE — PROGRESS NOTES
Patient identifiers verified. Patient advised to continue iron. Labs overall look good. He does have mildly elevated blood sugar but it is not of major concern at this time. He voices understanding.

## 2020-04-27 DIAGNOSIS — D64.9 ANEMIA, UNSPECIFIED TYPE: ICD-10-CM

## 2020-04-28 RX ORDER — LANOLIN ALCOHOL/MO/W.PET/CERES
CREAM (GRAM) TOPICAL
Qty: 90 TAB | Refills: 0 | Status: SHIPPED | OUTPATIENT
Start: 2020-04-28 | End: 2020-06-20 | Stop reason: SDUPTHER

## 2020-06-19 DIAGNOSIS — D64.9 ANEMIA, UNSPECIFIED TYPE: ICD-10-CM

## 2020-06-20 RX ORDER — LANOLIN ALCOHOL/MO/W.PET/CERES
CREAM (GRAM) TOPICAL
Qty: 90 TAB | Refills: 0 | Status: SHIPPED | OUTPATIENT
Start: 2020-06-20 | End: 2020-08-20 | Stop reason: SDUPTHER

## 2020-07-09 ENCOUNTER — VIRTUAL VISIT (OUTPATIENT)
Dept: FAMILY MEDICINE CLINIC | Age: 85
End: 2020-07-09

## 2020-07-09 DIAGNOSIS — R05.9 COUGH: Primary | ICD-10-CM

## 2020-07-09 NOTE — PROGRESS NOTES
Kirstin Melendez, who was evaluated through a synchronous (real-time) audio only encounter, and/or his healthcare decision maker, is aware that it is a billable service, with coverage as determined by his insurance carrier. He provided verbal consent to proceed: Yes, and patient identification was verified. It was conducted pursuant to the emergency declaration under the [facility], [address] [place] [address] and the [employer] and [employer] General Act. A caregiver was present when appropriate. Ability to conduct physical exam was limited. I was in the office. The patient was at home. Chief Complaint   Patient presents with    Cough    Sore Throat     SUBJECTIVE    The patient presents complaining of a 2 day history of cough. The cough is described as slightly wet but not coughing up. The patient does have nasal congestion and drainage that is mild. The patient minimal sinus pressure. The patient denies fevers as he has this checked daily at [employer] where he works as a . No known COVID exposures but he is around a lot of customers. He does mask. The patient denies shortness of breath, chest pain, chest tightness, or wheezing. The patient has tried no OTC meds. ROS:  Cardiac:  No chest pain or palpitations. GI: The patient denies any nausea or vomiting. No diarrhea or abdominal pain. Derm: no changes to skin on toes and fingers. HEENT: No loss of taste or smell. OBJECTIVE    He does not sound short of breath as he is talking. ASSESSMENT / PLAN    ICD-10-CM ICD-9-CM    1. Cough  R05 786.2 NOVEL CORONAVIRUS (COVID-19)     This is hopefully just a simply URI however we do need to test him for COVID-19 since he has so much exposure. I asked him to quarantine. I asked that he immediately calls us or goes to the ER should he experience any difficulty breathing.      Spent 8 minutes of phone time with the patient taking a history and developing a treatment plan. All chart history elements were reviewed by me at the time of the visit even though marked at time of note closure. Patient understands our medical plan. Patient has provided input and agrees with goals. Alternatives have been explained and offered. All questions answered. The patient is to call if condition worsens or fails to improve. RTC as scheduled.

## 2020-07-10 ENCOUNTER — CLINICAL SUPPORT (OUTPATIENT)
Dept: FAMILY MEDICINE CLINIC | Facility: CLINIC | Age: 85
End: 2020-07-10

## 2020-07-10 DIAGNOSIS — R05.9 COUGH: Primary | ICD-10-CM

## 2020-07-14 ENCOUNTER — TELEPHONE (OUTPATIENT)
Dept: FAMILY MEDICINE CLINIC | Age: 85
End: 2020-07-14

## 2020-07-14 NOTE — TELEPHONE ENCOUNTER
Pt states that he is having issues with his groin and would like to be seen on Thursday 7/16/2020 if possible. Please advise.

## 2020-07-15 ENCOUNTER — VIRTUAL VISIT (OUTPATIENT)
Dept: FAMILY MEDICINE CLINIC | Age: 85
End: 2020-07-15

## 2020-07-15 DIAGNOSIS — N50.89 SCROTAL SWELLING: Primary | ICD-10-CM

## 2020-07-15 NOTE — PROGRESS NOTES
Theone Hatchet, who was evaluated through a synchronous (real-time) audio only encounter, and/or his healthcare decision maker, is aware that it is a billable service, with coverage as determined by his insurance carrier. He provided verbal consent to proceed: Yes, and patient identification was verified. It was conducted pursuant to the emergency declaration under the [facility], [address] [place] [address] and the [employer] and [employer] General Act. A caregiver was present when appropriate. Ability to conduct physical exam was limited. I was in the office. The patient was at home. Chief Complaint   Patient presents with    Testicle Swelling     SUBJECTIVE    Patient presents for right sided scrotal swelling for the past 1 month . Says intermittent. It can be painful if he bumps it. He has no urinary complaints. ROS:  History obtained from the patient   · General: negative for - chills, fever  · Gastrointestinal: no abdominal pain, N/V  · : no hematuria, dysuria, frequency, hesitancy, or nocturia. ASSESSMENT / PLAN    ICD-10-CM ICD-9-CM    1. Scrotal swelling  N50.89 608.86 US SCROTUM/TESTICLES     Advised on ultrasound. I am somewhat curious if he has a hernia present. Call for results. 5 minutes of phone time spent with patient taking history and developing and explaining treatment plan. All chart history elements were reviewed by me at the time of the visit even though marked at time of note closure. Patient understands our medical plan. Patient has provided input and agrees with goals. Alternatives have been explained and offered. All questions answered. The patient is to call if condition worsens or fails to improve. RTC as scheduled.

## 2020-07-23 ENCOUNTER — HOSPITAL ENCOUNTER (OUTPATIENT)
Dept: ULTRASOUND IMAGING | Age: 85
Discharge: HOME | End: 2020-07-23
Attending: FAMILY MEDICINE
Payer: MEDICARE

## 2020-07-23 DIAGNOSIS — N50.89 SCROTAL SWELLING: ICD-10-CM

## 2020-07-23 PROCEDURE — 93975 VASCULAR STUDY: CPT

## 2020-08-07 NOTE — PROGRESS NOTES
Patient identifiers verified. Patient advised jeremy there are no abnormalities on his ultrasound and to check and see how his pain is. He does have a hydrocele which is a collection of fluid but this is nothing serious. Patient already has an appt scheduled with urology-Dr. Yoni Banks on 9/10/2020.

## 2020-08-07 NOTE — PROGRESS NOTES
Nurse to advise there are no abnormalities on his ultrasound and to check and see how his pain is. He does have a hydrocele which is a collection of fluid but this is nothing serious. If he wants that to be checked out, he can see a urologist if he desires.

## 2020-08-19 ENCOUNTER — TELEPHONE (OUTPATIENT)
Dept: FAMILY MEDICINE CLINIC | Age: 85
End: 2020-08-19

## 2020-08-19 NOTE — TELEPHONE ENCOUNTER
Have you been diagnosed with, tested for, or told that you are suspected of having COVID-19 (coronovirus)? Tested about two weeks ago - Negative - Just for screening purposes  Have you had a fever or taken medication to treat a fever in the past 72 hours? No  Have you had a cough, SOB, or flu-like symptoms within the past 3 days? No  Have you had direct contact with someone who tested positive for COVID-19 within the past 14 days? No   Do you have a household member with flu-like symptoms, including fever, cough, or SOB? No   Do you currently have flu-like symptoms including fever, cough, or SOB?  No

## 2020-08-20 ENCOUNTER — TELEPHONE (OUTPATIENT)
Dept: FAMILY MEDICINE CLINIC | Age: 85
End: 2020-08-20

## 2020-08-20 ENCOUNTER — OFFICE VISIT (OUTPATIENT)
Dept: FAMILY MEDICINE CLINIC | Age: 85
End: 2020-08-20

## 2020-08-20 VITALS
WEIGHT: 163 LBS | OXYGEN SATURATION: 99 % | TEMPERATURE: 98.3 F | HEIGHT: 71 IN | BODY MASS INDEX: 22.82 KG/M2 | RESPIRATION RATE: 16 BRPM | DIASTOLIC BLOOD PRESSURE: 58 MMHG | SYSTOLIC BLOOD PRESSURE: 138 MMHG | HEART RATE: 66 BPM

## 2020-08-20 VITALS
WEIGHT: 163 LBS | HEART RATE: 66 BPM | OXYGEN SATURATION: 99 % | RESPIRATION RATE: 16 BRPM | BODY MASS INDEX: 22.82 KG/M2 | TEMPERATURE: 98.3 F | DIASTOLIC BLOOD PRESSURE: 58 MMHG | HEIGHT: 71 IN | SYSTOLIC BLOOD PRESSURE: 138 MMHG

## 2020-08-20 DIAGNOSIS — M48.061 SPINAL STENOSIS OF LUMBAR REGION, UNSPECIFIED WHETHER NEUROGENIC CLAUDICATION PRESENT: ICD-10-CM

## 2020-08-20 DIAGNOSIS — Z23 ENCOUNTER FOR IMMUNIZATION: ICD-10-CM

## 2020-08-20 DIAGNOSIS — Z71.89 ADVANCED DIRECTIVES, COUNSELING/DISCUSSION: ICD-10-CM

## 2020-08-20 DIAGNOSIS — D64.9 ANEMIA, UNSPECIFIED TYPE: ICD-10-CM

## 2020-08-20 DIAGNOSIS — Z13.31 SCREENING FOR DEPRESSION: ICD-10-CM

## 2020-08-20 DIAGNOSIS — E53.8 VITAMIN B12 DEFICIENCY: ICD-10-CM

## 2020-08-20 DIAGNOSIS — N43.3 HYDROCELE, RIGHT: ICD-10-CM

## 2020-08-20 DIAGNOSIS — Z00.00 MEDICARE ANNUAL WELLNESS VISIT, SUBSEQUENT: Primary | ICD-10-CM

## 2020-08-20 DIAGNOSIS — I10 ESSENTIAL HYPERTENSION: Primary | ICD-10-CM

## 2020-08-20 DIAGNOSIS — R73.01 IFG (IMPAIRED FASTING GLUCOSE): ICD-10-CM

## 2020-08-20 LAB — HBA1C MFR BLD HPLC: 5.6 %

## 2020-08-20 RX ORDER — PREGABALIN 100 MG/1
100 CAPSULE ORAL 2 TIMES DAILY
Qty: 180 CAP | Refills: 1 | Status: SHIPPED | OUTPATIENT
Start: 2020-08-20 | End: 2021-02-25 | Stop reason: SDUPTHER

## 2020-08-20 RX ORDER — LANOLIN ALCOHOL/MO/W.PET/CERES
325 CREAM (GRAM) TOPICAL
Qty: 30 TAB | Refills: 5 | Status: SHIPPED | OUTPATIENT
Start: 2020-08-20 | End: 2021-02-25 | Stop reason: SDUPTHER

## 2020-08-20 RX ORDER — LIDOCAINE 50 MG/G
PATCH TOPICAL
Qty: 30 EACH | Refills: 11 | Status: SHIPPED | OUTPATIENT
Start: 2020-08-20 | End: 2022-03-03

## 2020-08-20 RX ORDER — LANOLIN ALCOHOL/MO/W.PET/CERES
1000 CREAM (GRAM) TOPICAL DAILY
Qty: 30 TAB | Refills: 11 | Status: SHIPPED | OUTPATIENT
Start: 2020-08-20 | End: 2021-02-25 | Stop reason: SDUPTHER

## 2020-08-20 NOTE — PROGRESS NOTES
Chief Complaint   Patient presents with    Annual Wellness Visit     This is a subsequent Medicare Annual Wellness Exam (AWV)     I have reviewed the patient's medical history in detail and updated the computerized patient record. 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 Johnathon Palacios Left 2015     Current Outpatient Medications   Medication Sig Dispense Refill    pantoprazole (PROTONIX) 40 mg tablet TAKE 1 TABLET EVERY DAY 90 Tab 3    diclofenac (VOLTAREN) 1 % gel Apply  to affected area four (4) times daily. 100 g 4    tamsulosin (FLOMAX) 0.4 mg capsule take 1 capsule by mouth once daily 90 Cap 0    ferrous sulfate 325 mg (65 mg iron) tablet Take 1 Tab by mouth Daily (before breakfast). 30 Tab 5    pregabalin (LYRICA) 100 mg capsule Take 1 Cap by mouth two (2) times a day. Max Daily Amount: 200 mg. Dx: spinal stenosis 180 Cap 1    lidocaine (LIDODERM) 5 % Apply patch to the affected area for 12 hours a day and remove for 12 hours a day. 30 Each 11    cyanocobalamin (Vitamin B-12) 1,000 mcg tablet Take 1 Tab by mouth daily.  27 Tab 11     No Known Allergies  Family History   Problem Relation Age of Onset    Cancer Mother     Heart Attack Daughter      Social History     Tobacco Use    Smoking status: Former Smoker     Packs/day: 1.00     Years: 25.00     Pack years: 25.00     Last attempt to quit: 1990     Years since quittin.2    Smokeless tobacco: Never Used   Substance Use Topics    Alcohol use: No     Frequency: Never     Patient Active Problem List   Diagnosis Code    Osteoarthrosis, unspecified whether generalized or localized, pelvic region and thigh M16.9    HTN (hypertension) I10    Retention of urine R33.9    Left hip pain M25.552    Wears glasses Z97.3    Hip arthritis M16.10    Spinal stenosis of lumbar region M48.061    Synovial cyst, RT L4-5 M71.30    Syncope R55    GI bleed K92.2    Anemia D64.9    Esophageal ulcer K22.10    Near syncope R55       Depression Risk Factor Screening:     3 most recent PHQ Screens 8/20/2020   Little interest or pleasure in doing things Not at all   Feeling down, depressed, irritable, or hopeless Not at all   Total Score PHQ 2 0     Alcohol Risk Factor Screening: You do not drink alcohol or very rarely. Functional Ability and Level of Safety:     Hearing Loss  Hearing is good. Activities of Daily Living  The home contains: use of rollator walker  Patient does total self care but does get help as needed from granddaughter he lives with. Fall Risk  Fall Risk Assessment, last 12 mths 8/20/2020   Able to walk? Yes   Fall in past 12 months? No   Fall with injury? -   Number of falls in past 12 months -   Fall Risk Score -       Abuse Screen  Patient is not abused    Cognitive Screening   Evaluation of Cognitive Function:  Has your family/caregiver stated any concerns about your memory: no  Normal    Patient Care Team   Patient Care Team:  Matt Gallegos MD as PCP - General (Family Medicine)  Matt Gallegos MD as PCP - [facility] Empaneled Provider  Regan Boone MD (Orthopedic Surgery)  Melissa Gaytan MD (Physical Medicine and Rehabilitation)  Arleen Faulkner MD (Gastroenterology)    Assessment/Plan   Education and counseling provided:  Are appropriate based on today's review and evaluation      ICD-10-CM ICD-9-CM    1. Medicare annual wellness visit, subsequent  Z00.00 V70.0    2. Advanced directives, counseling/discussion  Z71.89 V65.49 ADVANCE CARE PLANNING FIRST 30 MINS   3. Screening for depression  Z13.31 V79.0 DEPRESSION SCREEN ANNUAL   4. Encounter for immunization  Z23 V03.89 ADMIN PNEUMOCOCCAL VACCINE      PNEUMOCOCCAL POLYSACCHARIDE VACCINE, 23-VALENT, ADULT OR IMMUNOSUPPRESSED PT DOSE,     Age and sex specific counseling. Screening for depression and alcoholism. Advanced directives / end of life planning discussion and packet provided for review at home. See ACP note. Care team updated. PPSV23 administered. Medicare recommended screening and prevention test table is filled out, reviewed, and provided to patient. Scanned to chart as well. Screening and prevention is up to date given his age. Patient understands our medical plan. Patient has provided input and agrees with goals. All questions answered. F/U yearly AWV.

## 2020-08-20 NOTE — TELEPHONE ENCOUNTER
Fax received from [address] [place] [address] meds stating prior auth is required for the Lidocaine 5% Patches . Submit a PA request  1. Go to key. [employer] and click \"Enter a Key\"  2. Patient last name: Satnam Paez      : 34      Key: [id number]  3.  Click \"start a PA\", complete the form, and \"send to plan\"

## 2020-08-20 NOTE — PROGRESS NOTES
Chief Complaint   Patient presents with   Lona Krystin Annual Wellness Visit     3 most recent PHQ Screens 8/20/2020   Little interest or pleasure in doing things Not at all   Feeling down, depressed, irritable, or hopeless Not at all   Total Score PHQ 2 0     Abuse Screening Questionnaire 2/14/2020   Do you ever feel afraid of your partner? N   Are you in a relationship with someone who physically or mentally threatens you? N   Is it safe for you to go home? Y     Fall Risk Assessment, last 12 mths 8/20/2020   Able to walk? Yes   Fall in past 12 months? No   Fall with injury? -   Number of falls in past 12 months -   Fall Risk Score -       1. Have you been to the ER, urgent care clinic since your last visit? Hospitalized since your last visit? No    2. Have you seen or consulted any other health care providers outside of the [address] [place] [address] since your last visit? Include any pap smears or colon screening.  No

## 2020-08-20 NOTE — ACP (ADVANCE CARE PLANNING)
Advance Care Planning       Advance Care Planning (ACP) Physician/NP/PA (Provider) [facility]        Date of ACP Conversation: 8/20/2020    Conversation Conducted with:   Patient with [address] Maker:    Current Designated Health Care Decision Maker:   Primary Decision Maker: Ferdinand Birch - Other Relative - [phone]    Secondary Decision Maker: Bishnu Reynoso - Other Relative - [phone]    Care Preferences:    Hospitalization: \"If your health worsens and it becomes clear that your chance of recovery is unlikely, what would your preference be regarding hospitalization? \"    \"Unsure, have not given it much thought\"      Ventilation: \"If you were in your present state of health and suddenly became very ill and were unable to breathe on your own, what would your preference be about the use of a ventilator (breathing machine) if it was available to you? \"      \"Yes, I would want\"    \"If your health worsens and it becomes clear that your chance of recovery is unlikely, what would your preference be about the use of a ventilator (breathing machine) if it was available to you? \"     \"I do not think so\"      Resuscitation:  \"CPR works best to restart the heart when there is a sudden event, like a heart attack, in someone who is otherwise healthy. Unfortunately, CPR does not typically restart the heart for people who have serious health conditions or who are very sick. \"    \"In the event your heart stopped as a result of an underlying serious health condition, would you want attempts to be made to restart your heart (answer \"yes\" for attempt to resuscitate) or would you prefer a natural death (answer \"no\" for do not attempt to resuscitate)? \"     \"yes If I would recover\"    NOTE: If the patient has a valid advance directive AND provides care preference(s) that are inconsistent with that prior directive, advise the patient to consider either: creating a new advance directive that complies with state-specific requirements; or, if that is not possible, orally revoking that prior directive in accordance with state-specific requirements, which must be documented in the EHR.     Conversation Outcomes / Follow-Up Plan:   Recommended completion of Advance Directive      Length of Voluntary ACP Conversation in minutes:  16 minutes      Sheldon Rogers MD

## 2020-08-20 NOTE — PROGRESS NOTES
SUBJECTIVE  Chief Complaint   Patient presents with    Hypertension    Anemia    Other     IFG/prediabetes     Patient presents for follow-up. He has a history of HTN and used to be on norvasc. The patient denies any chest pain or chest tightness. Denies any dyspnea upon exertion. The patient denies any headaches, blurred vision, or double vision. He has lyrica 100mg capsules and reports he is taking both twice daily. He lives with his granddaughter. He has been advised by his spine specialist to follow-up with PCP since he is not a surgical candidate. Says he has occasional breakthrough pain and takes tylenol. Some relief with lidoderm patches. He has a history of a bleeding esophageal ulcer. He has had improvements in his Hg and has had GI follow-up care. In the process he was diagnosed with a B12 deficiency and was started on vit B12 1000 daily along with iron, both of which he is taking. He has not had any melena or BRBPR. He is on PPI therapy but stopped seeing GI.    ROS:  History obtained from the patient   · General: negative for - chills, fever  · HEENT: no sore throat, nasal congestion  · Respiratory: no cough, shortness of breath, or wheezing  · Cardiovascular: no chest pain, palpitations, or dyspnea on exertion  · Gastrointestinal: no abdominal pain, N/V, change in bowel habits, or black or bloody stools  · Neurological: no numbness, tingling, headache or dizziness  · Endo:  No polyuria or polydipsia. · : no hematuria, dysuria, frequency, hesitancy, or nocturia. Diagnosed with right hydrocele. He says it sometimes hurts. He would like to get it fixed if it is a simple procedure.     Past Medical History:   Diagnosis Date    Arthritis     Chronic pain     left hip and lower back    Hypertension     Left hip pain     resolved since surgery    Thromboembolus ([facility] [address]    brain, [facility] Wears glasses        Current Outpatient Medications:     ferrous sulfate 325 mg (65 mg iron) tablet, Take 1 Tab by mouth Daily (before breakfast). , Disp: 30 Tab, Rfl: 5    pregabalin (LYRICA) 100 mg capsule, Take 1 Cap by mouth two (2) times a day. Max Daily Amount: 200 mg. Dx: spinal stenosis, Disp: 180 Cap, Rfl: 1    lidocaine (LIDODERM) 5 %, Apply patch to the affected area for 12 hours a day and remove for 12 hours a day., Disp: 30 Each, Rfl: 11    cyanocobalamin (Vitamin B-12) 1,000 mcg tablet, Take 1 Tab by mouth daily. , Disp: 30 Tab, Rfl: 11    pantoprazole (PROTONIX) 40 mg tablet, TAKE 1 TABLET EVERY DAY, Disp: 90 Tab, Rfl: 3    diclofenac (VOLTAREN) 1 % gel, Apply  to affected area four (4) times daily. , Disp: 100 g, Rfl: 4    tamsulosin (FLOMAX) 0.4 mg capsule, take 1 capsule by mouth once daily, Disp: 90 Cap, Rfl: 0    No Known Allergies    Past Surgical History:   Procedure Laterality Date    FOOT/TOES SURGERY PROC UNLISTED      HX HIP REPLACEMENT Right     HX OTHER SURGICAL      brain surgery - says he hit his head and then had headaches and had to have surgery,  Dr. Rashida Padgett Left 2015     Family History   Problem Relation Age of Onset    Cancer Mother     Heart Attack Daughter      Social History     Socioeconomic History    Marital status:      Spouse name: Not on file    Number of children: Not on file    Years of education: Not on file    Highest education level: Not on file   Occupational History    Not on file   Social Needs    Financial resource strain: Not on file    Food insecurity     Worry: Not on file     Inability: Not on file    Transportation needs     Medical: Not on file     Non-medical: Not on file   Tobacco Use    Smoking status: Former Smoker     Packs/day: 1.00     Years: 25.00     Pack years: 25.00     Last attempt to quit: 1990     Years since quittin.2    Smokeless tobacco: Never Used   Substance and Sexual Activity    Alcohol use: No     Frequency: Never    Drug use: No    Sexual activity: Not Currently     Partners: Female   Lifestyle    Physical activity     Days per week: Not on file     Minutes per session: Not on file    Stress: Not on file   Relationships    Social connections     Talks on phone: Not on file     Gets together: Not on file     Attends [employer] service: Not on file     Active member of club or organization: Not on file     Attends meetings of clubs or organizations: Not on file     Relationship status: Not on file    Intimate partner violence     Fear of current or ex partner: Not on file     Emotionally abused: Not on file     Physically abused: Not on file     Forced sexual activity: Not on file   Other Topics Concern    Not on file   Social History Narrative    Not on file     OBJECTIVE    Blood pressure 138/58, pulse 66, temperature 98.3 °F (36.8 °C), temperature source Oral, resp. rate 16, height 5' 11\" (1.803 m), weight 163 lb (73.9 kg), SpO2 99 %. General:  Alert, cooperative, well appearing, in no apparent distress. CV:  The heart sounds are regular in rate and rhythm. There is a normal S1 and S2. There or no murmurs. Lungs: Inspiratory and expiratory efforts are full and unlabored. Lung sounds are clear and equal to auscultation throughout all lung fields without wheezing, rales, or rhonchi. Ext: no swelling. Neuro:  No deficits. Not using walker currently. Psych: normal affect. Mood good. Oriented x 3. Judgement and insight intact. Study Result     EXAM: Ultrasound of the Scrotum      INDICATION: scrotal pain and swelling right side     TECHNIQUE: Ultrasound of the scrotum performed with gray scale and color  Doppler.     COMPARISON: CT of the abdomen and pelvis dated 2/15/2018     FINDINGS:     The right testicle measures 4 x 2.6 x 2.7 cm. The echotexture is unremarkable. No mass lesion identified. The internal vascularity is unremarkable.   The  epididymis has a 2.3 x 0.4 x 0.4 cm cyst. There is an adjacent 0.3 x 0.2 x 0.6  cm cyst. Moderate hydrocele is noted.     The left testicle measures 4 x 1.4 x 3.1 cm. The echotexture is unremarkable. No  mass lesion identified. The internal vascularity is preserved. The epididymis  is grossly unremarkable. Small hydrocele is noted.     IMPRESSION  IMPRESSION:  1. No acute pathology appreciated in either testes.     2. Moderate hydrocele on the right and small left hydrocele.      Imaging     US SCROTUM/TESTICLES W DOPPLER (Order: [id number]) - 7/23/2020     Results for orders placed or performed in visit on 08/20/20   AMB POC HEMOGLOBIN A1C   Result Value Ref Range    Hemoglobin A1c (POC) 5.6 %       ASSESSMENT / PLAN    ICD-10-CM ICD-9-CM    1. Essential hypertension  I10 401.9    2. IFG (impaired fasting glucose)  R73.01 790.21 AMB POC HEMOGLOBIN A1C      CBC WITH AUTOMATED DIFF      METABOLIC PANEL, COMPREHENSIVE      HEMOGLOBIN A1C W/O EAG   3. Anemia, unspecified type  D64.9 285.9 ferrous sulfate 325 mg (65 mg iron) tablet      CBC WITH AUTOMATED DIFF      METABOLIC PANEL, COMPREHENSIVE      FERRITIN   4. Vitamin B12 deficiency  E53.8 266.2 VITAMIN B12   5. Spinal stenosis of lumbar region, unspecified whether neurogenic claudication present  M48.061 724.02 pregabalin (LYRICA) 100 mg capsule   6. Hydrocele, right  N43.3 603.9      HTN - He is no longer on his norvasc and normotensive. We will follow as is. IFG - normal A1c. Anemia secondary to GI ulcer- cont iron for now. Cont vit B12 lifelong. Check vit B12 and ferritin. Cont PPI. B12 def - check levels. Cont B12. Spinal stenosis - Cont Lyrica 100mg bid. As needed lidoderm and tylenol. Hydrocele - he will see urology to discuss treatment options and weigh if it is worth it. This does bother him so at least a consultation is warranted. All chart history elements were reviewed by me at the time of the visit even though marked at time of note closure. Patient understands our medical plan.  Patient has provided input and agrees with goals. Alternatives have been explained and offered. All questions answered. The patient is to call if condition worsens or fails to improve. Follow-up and Dispositions    · Return in about 6 months (around 2/20/2021) for routine care. Fasting labs due 3-7 days prior to appointment.

## 2020-08-20 NOTE — PROGRESS NOTES
1. Have you been to the ER, urgent care clinic since your last visit? Hospitalized since your last visit? No    2. Have you seen or consulted any other health care providers outside of the [address] [place] [address] since your last visit? Include any pap smears or colon screening. No    Physician order obtained. Patient completed adult immunization consent form. Allergies, contraindications and recommendations reviewed with patient. Pneumovax 23 vaccine administered IM right deltoid. Patient tolerated well. Patient remained in office for 15 minutes after injection and no adverse reactions were noted.

## 2020-08-20 NOTE — PATIENT INSTRUCTIONS
[employer]  [facility].   [place], [address]   Phone: [phone]

## 2020-09-02 ENCOUNTER — APPOINTMENT (OUTPATIENT)
Dept: GENERAL RADIOLOGY | Age: 85
End: 2020-09-02
Attending: EMERGENCY MEDICINE
Payer: MEDICARE

## 2020-09-02 ENCOUNTER — HOSPITAL ENCOUNTER (EMERGENCY)
Age: 85
Discharge: HOME | End: 2020-09-02
Attending: EMERGENCY MEDICINE
Payer: MEDICARE

## 2020-09-02 VITALS
HEIGHT: 71 IN | DIASTOLIC BLOOD PRESSURE: 72 MMHG | BODY MASS INDEX: 22.4 KG/M2 | RESPIRATION RATE: 12 BRPM | SYSTOLIC BLOOD PRESSURE: 168 MMHG | WEIGHT: 160 LBS | TEMPERATURE: 98 F | OXYGEN SATURATION: 99 % | HEART RATE: 59 BPM

## 2020-09-02 DIAGNOSIS — R07.89 ATYPICAL CHEST PAIN: Primary | ICD-10-CM

## 2020-09-02 LAB
ANION GAP SERPL CALC-SCNC: 4 MMOL/L (ref 3–18)
ATRIAL RATE: 71 BPM
BASOPHILS # BLD: 0 K/UL (ref 0–0.1)
BASOPHILS NFR BLD: 0 % (ref 0–2)
BUN SERPL-MCNC: 26 MG/DL (ref 7–18)
BUN/CREAT SERPL: 20 (ref 12–20)
CALCIUM SERPL-MCNC: 8.7 MG/DL (ref 8.5–10.1)
CALCULATED P AXIS, ECG09: 70 DEGREES
CALCULATED R AXIS, ECG10: 1 DEGREES
CHLORIDE SERPL-SCNC: 106 MMOL/L (ref 100–111)
CO2 SERPL-SCNC: 27 MMOL/L (ref 21–32)
CREAT SERPL-MCNC: 1.33 MG/DL (ref 0.6–1.3)
DIAGNOSIS, 93000: NORMAL
DIFFERENTIAL METHOD BLD: ABNORMAL
EOSINOPHIL # BLD: 0.4 K/UL (ref 0–0.4)
EOSINOPHIL NFR BLD: 6 % (ref 0–5)
ERYTHROCYTE [DISTWIDTH] IN BLOOD BY AUTOMATED COUNT: 13.2 % (ref 11.6–14.5)
GLUCOSE SERPL-MCNC: 104 MG/DL (ref 74–99)
HCT VFR BLD AUTO: 37.9 % (ref 36–48)
HGB BLD-MCNC: 12.4 G/DL (ref 13–16)
LYMPHOCYTES # BLD: 1 K/UL (ref 0.9–3.6)
LYMPHOCYTES NFR BLD: 16 % (ref 21–52)
MAGNESIUM SERPL-MCNC: 1.7 MG/DL (ref 1.6–2.6)
MCH RBC QN AUTO: 30.8 PG (ref 24–34)
MCHC RBC AUTO-ENTMCNC: 32.7 G/DL (ref 31–37)
MCV RBC AUTO: 94 FL (ref 74–97)
MONOCYTES # BLD: 0.7 K/UL (ref 0.05–1.2)
MONOCYTES NFR BLD: 11 % (ref 3–10)
NEUTS SEG # BLD: 4.2 K/UL (ref 1.8–8)
NEUTS SEG NFR BLD: 67 % (ref 40–73)
P-R INTERVAL, ECG05: 154 MS
PLATELET # BLD AUTO: 179 K/UL (ref 135–420)
PMV BLD AUTO: 12.1 FL (ref 9.2–11.8)
POTASSIUM SERPL-SCNC: 3.6 MMOL/L (ref 3.5–5.5)
Q-T INTERVAL, ECG07: 400 MS
QRS DURATION, ECG06: 76 MS
QTC CALCULATION (BEZET), ECG08: 434 MS
RBC # BLD AUTO: 4.03 M/UL (ref 4.7–5.5)
SODIUM SERPL-SCNC: 137 MMOL/L (ref 136–145)
TROPONIN I SERPL-MCNC: 0.03 NG/ML (ref 0–0.04)
TROPONIN I SERPL-MCNC: 0.03 NG/ML (ref 0–0.04)
VENTRICULAR RATE, ECG03: 71 BPM
WBC # BLD AUTO: 6.3 K/UL (ref 4.6–13.2)

## 2020-09-02 PROCEDURE — 84484 ASSAY OF TROPONIN QUANT: CPT

## 2020-09-02 PROCEDURE — 80048 BASIC METABOLIC PNL TOTAL CA: CPT

## 2020-09-02 PROCEDURE — 93005 ELECTROCARDIOGRAM TRACING: CPT

## 2020-09-02 PROCEDURE — 83735 ASSAY OF MAGNESIUM: CPT

## 2020-09-02 PROCEDURE — 71046 X-RAY EXAM CHEST 2 VIEWS: CPT

## 2020-09-02 PROCEDURE — 85025 COMPLETE CBC W/AUTO DIFF WBC: CPT

## 2020-09-02 PROCEDURE — 99284 EMERGENCY DEPT VISIT MOD MDM: CPT

## 2020-09-02 NOTE — PROGRESS NOTES
conducted an initial consultation and Spiritual Assessment for Andreia Anguiano, who is a 80 y.o.,male. Patient's Primary Language is: [place]. According to the patient's EMR Protestant Affiliation is: Jake White. The reason the Patient came to the hospital is:   Patient Active Problem List    Diagnosis Date Noted    HTN (hypertension) 06/10/2015     Priority: 1 - One    Near syncope 04/07/2018    Esophageal ulcer 02/17/2018    Syncope 02/15/2018    GI bleed 02/15/2018    Anemia 02/15/2018    Synovial cyst, RT L4-5 10/12/2017    Spinal stenosis of lumbar region 12/08/2016    Hip arthritis 02/02/2016    Left hip pain     Wears glasses     Retention of urine 06/25/2015    Osteoarthrosis, unspecified whether generalized or localized, pelvic region and thigh 06/09/2015        The  provided the following Interventions:  Initiated a relationship of care and support. Explored issues of pierre, belief, spirituality and Restoration/ritual needs while in ED. Listened empathically. Provided information about Spiritual Care Services. Offered prayer and assurance of continued prayers on patient's behalf. Chart reviewed. The following outcomes where achieved:  Patient shared limited information about both their medical narrative and spiritual journey/beliefs.  confirmed Patient's Protestant Affiliation. Patient processed feeling about current hospitalization. Patient expressed gratitude for 's visit. Assessment:  Patient does not have any Restoration/cultural needs that will affect patient's preferences in health care. There are no known spiritual or Restoration issues which require intervention at this time. Plan:  Chaplains will continue to follow and will provide pastoral care on an as needed/requested basis.     [address] [place] [address]   [phone]

## 2020-09-02 NOTE — ED TRIAGE NOTES
Patient arrives ambulatory with complaint of chest pain x 1 day with a feeling of his heart racing. Patient in no distress. Non-diaphoretic. ekg obtained and shown to provider. Patient placed on cardiac monitor. Iv established and labs drawn.

## 2020-09-02 NOTE — DISCHARGE INSTRUCTIONS
1.  EKG is unchanged from prior. No findings of heart attack. 2.  Heart muscle enzyme studies were normal x2 (troponin). This rules out a heart attack but does not rule out the possibility of angina. 3.  Follow-up with cardiology for additional outpatient work-up, next available appointment. 4.  Continue aspirin therapy daily as discussed. 5.  Return to the emergency department for recurrent pain, shortness of breath, or other acutely worsening symptoms.

## 2020-09-04 ENCOUNTER — OFFICE VISIT (OUTPATIENT)
Dept: CARDIOLOGY CLINIC | Age: 85
End: 2020-09-04

## 2020-09-04 VITALS
HEART RATE: 60 BPM | DIASTOLIC BLOOD PRESSURE: 58 MMHG | SYSTOLIC BLOOD PRESSURE: 140 MMHG | OXYGEN SATURATION: 99 % | HEIGHT: 71 IN | BODY MASS INDEX: 22.82 KG/M2 | WEIGHT: 163 LBS

## 2020-09-04 DIAGNOSIS — R07.9 CHEST PAIN, UNSPECIFIED TYPE: Primary | ICD-10-CM

## 2020-09-04 DIAGNOSIS — R94.31 NONSPECIFIC ABNORMAL ELECTROCARDIOGRAM (ECG) (EKG): ICD-10-CM

## 2020-09-04 DIAGNOSIS — R00.2 HEART PALPITATIONS: ICD-10-CM

## 2020-09-04 DIAGNOSIS — R03.0 BORDERLINE SYSTOLIC HYPERTENSION: ICD-10-CM

## 2020-09-04 RX ORDER — GUAIFENESIN 100 MG/5ML
81 LIQUID (ML) ORAL DAILY
COMMUNITY

## 2020-09-04 NOTE — PROGRESS NOTES
Ronda Riggins presents today for   Chief Complaint   Patient presents with    New Patient     Ref by ER for Chest Pain       Ronda Riggins preferred language for health care discussion is english/other. Is someone accompanying this pt? no    Is the patient using any DME equipment during [address]? no    Depression Screening:  3 most recent PHQ Screens 9/4/2020   Little interest or pleasure in doing things Not at all   Feeling down, depressed, irritable, or hopeless Not at all   Total Score PHQ 2 0       Learning Assessment:  Learning Assessment 9/4/2020   PRIMARY LEARNER Patient   HIGHEST LEVEL OF EDUCATION - PRIMARY LEARNER  -   BARRIERS PRIMARY LEARNER -   CO-LEARNER CAREGIVER -   PRIMARY LANGUAGE ENGLISH   LEARNER PREFERENCE PRIMARY DEMONSTRATION   ANSWERED BY patient   RELATIONSHIP SELF       Abuse Screening:  Abuse Screening Questionnaire 9/4/2020   Do you ever feel afraid of your partner? N   Are you in a relationship with someone who physically or mentally threatens you? N   Is it safe for you to go home? Y       Fall Risk  Fall Risk Assessment, last 12 mths 9/4/2020   Able to walk? Yes   Fall in past 12 months? No   Fall with injury? -   Number of falls in past 12 months -   Fall Risk Score -       Pt currently taking Anticoagulant therapy? no    Coordination of Care:  1. Have you been to the ER, urgent care clinic since your last visit? Hospitalized since your last visit? no    2. Have you seen or consulted any other health care providers outside of the [address] [place] [address] since your last visit? Include any pap smears or colon screening.  no

## 2020-09-04 NOTE — PROGRESS NOTES
HISTORY OF PRESENT ILLNESS  Abena Méndez is a 80 y.o. male. HPI    Patient presents for a new office visit. He was referred here from the emergency room for evaluation of chest pain. He does not have a prior cardiac history. He does have a history of borderline systolic hypertension which she has been able to control with lifestyle modifications. He had an episode of chest pain 2 days ago which occurred while he was at work. 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 He did notice some tightness in the left side of his chest which persisted for almost 3 or more hours prompting him to be seen in the emergency room. In the emergency room, he was found to have nonspecific ST segment changes on his EKG, occasional atrial premature contractions. 2 sets of cardiac biomarkers were done several hours apart both of which were normal so he was discharged home with outpatient follow-up. He did undergo an echocardiogram back in 2008 which was unremarkable showing preserved LV function, EF 55 to 60% with mild tricuspid regurgitation and mildly elevated PA pressure. He also complains of intermittent heart palpitations about once or twice a month which only last for a minute or 2 at most in duration. He states he will feel a little dizzy with a heart palpitations which she describes as his heart racing. He is never had a syncopal or near syncopal episode. Past Medical History:   Diagnosis Date    Arthritis     Chronic pain     left hip and lower back    Hypertension     Left hip pain     resolved since surgery    Thromboembolus ([facility] [address] 1975    [facility]    Wears glasses      Current Outpatient Medications   Medication Sig Dispense Refill    aspirin 81 mg chewable tablet Take 81 mg by mouth daily.  ferrous sulfate 325 mg (65 mg iron) tablet Take 1 Tab by mouth Daily (before breakfast).  30 Tab 5    pregabalin (LYRICA) 100 mg capsule Take 1 Cap by mouth two (2) times a day. Max Daily Amount: 200 mg. Dx: spinal stenosis 180 Cap 1    lidocaine (LIDODERM) 5 % Apply patch to the affected area for 12 hours a day and remove for 12 hours a day. 30 Each 11    cyanocobalamin (Vitamin B-12) 1,000 mcg tablet Take 1 Tab by mouth daily. 30 Tab 11    pantoprazole (PROTONIX) 40 mg tablet TAKE 1 TABLET EVERY DAY 90 Tab 3    diclofenac (VOLTAREN) 1 % gel Apply  to affected area four (4) times daily. 100 g 4    tamsulosin (FLOMAX) 0.4 mg capsule take 1 capsule by mouth once daily 90 Cap 0     No Known Allergies     Social History     Tobacco Use    Smoking status: Former Smoker     Packs/day: 1.00     Years: 25.00     Pack years: 25.00     Last attempt to quit: 1990     Years since quittin.2    Smokeless tobacco: Never Used   Substance Use Topics    Alcohol use: No     Frequency: Never    Drug use: No     Family History   Problem Relation Age of Onset    Cancer Mother     Heart Attack Daughter          Review of Systems   Constitutional: Negative for chills, fever and weight loss. HENT: Negative for nosebleeds. Eyes: Negative for blurred vision and double vision. Respiratory: Negative for cough, shortness of breath and wheezing. Cardiovascular: Positive for chest pain and palpitations. Negative for orthopnea, claudication, leg swelling and PND. Gastrointestinal: Negative for abdominal pain, heartburn, nausea and vomiting. Genitourinary: Negative for dysuria and hematuria. Musculoskeletal: Negative for falls and myalgias. Skin: Negative for rash. Neurological: Negative for dizziness, focal weakness and headaches. Endo/Heme/Allergies: Does not bruise/bleed easily. Psychiatric/Behavioral: Negative for substance abuse.      Visit Vitals  /58 (BP 1 Location: Left arm, BP Patient Position: Sitting)   Pulse 60   Ht 5' 11\" (1.803 m)   Wt 73.9 kg (163 lb)   SpO2 99%   BMI 22.73 kg/m²       Physical Exam   Constitutional: He is oriented to person, place, and time. He appears well-developed and well-nourished. HENT:   Head: Normocephalic and atraumatic. Eyes: Conjunctivae are normal.   Neck: Neck supple. No JVD present. Carotid bruit is not present. Cardiovascular: Normal rate, regular rhythm, S1 normal, S2 normal and normal pulses. Exam reveals no gallop and no S3. No murmur heard. Pulmonary/Chest: Breath sounds normal. He has no wheezes. He has no rales. Abdominal: Soft. Bowel sounds are normal. There is no abdominal tenderness. Musculoskeletal:         General: No deformity or edema. Neurological: He is alert and oriented to person, place, and time. Skin: Skin is warm and dry. Psychiatric: He has a normal mood and affect. His behavior is normal. Thought content normal.     EKG September 2, 2020. Normal sinus rhythm, normal axis, occasional atrial premature contraction, nonspecific ST abnormality. Normal QTc interval.    ASSESSMENT and PLAN  Encounter Diagnoses   Name Primary?  Chest pain, unspecified type Yes    Nonspecific abnormal electrocardiogram (ECG) (EKG)     Borderline systolic hypertension     Heart palpitations      Chest pain. Both typical and atypical features for angina. I have recommended a pharmacologic nuclear stress test for further risk modification. He should continue to take his low-dose aspirin which was recently started. Nonspecific abnormal EKG. Patient does have nonspecific ST segment changes from his EKG done earlier this week. This will be evaluated with a stress test.    Heart palpitations. Unclear etiology at this point, but since his symptoms only last for a minute or 2 in duration, I would not pursue any further work-up unless they become more prolonged or more frequent. Borderline systolic hypertension. Patient blood pressure is minimally elevated in the office today. I recommend he monitor this closely and adhere to a less than 2 g sodium diet.     As well as a stress test is unremarkable, the patient can follow-up in 6 months, sooner if needed

## 2020-09-25 ENCOUNTER — OFFICE VISIT (OUTPATIENT)
Dept: CARDIOLOGY CLINIC | Age: 85
End: 2020-09-25
Payer: MEDICARE

## 2020-09-25 VITALS
SYSTOLIC BLOOD PRESSURE: 150 MMHG | HEIGHT: 71 IN | HEART RATE: 63 BPM | DIASTOLIC BLOOD PRESSURE: 82 MMHG | BODY MASS INDEX: 23.1 KG/M2 | WEIGHT: 165 LBS | OXYGEN SATURATION: 99 %

## 2020-09-25 DIAGNOSIS — R07.9 CHEST PAIN, UNSPECIFIED TYPE: ICD-10-CM

## 2020-09-25 DIAGNOSIS — R07.9 CHEST PAIN, UNSPECIFIED TYPE: Primary | ICD-10-CM

## 2020-09-25 PROCEDURE — G8420 CALC BMI NORM PARAMETERS: HCPCS | Performed by: INTERNAL MEDICINE

## 2020-09-25 PROCEDURE — G8432 DEP SCR NOT DOC, RNG: HCPCS | Performed by: INTERNAL MEDICINE

## 2020-09-25 PROCEDURE — 99204 OFFICE O/P NEW MOD 45 MIN: CPT | Performed by: INTERNAL MEDICINE

## 2020-09-25 PROCEDURE — 1101F PT FALLS ASSESS-DOCD LE1/YR: CPT | Performed by: INTERNAL MEDICINE

## 2020-09-25 PROCEDURE — G8536 NO DOC ELDER MAL SCRN: HCPCS | Performed by: INTERNAL MEDICINE

## 2020-09-25 PROCEDURE — G8427 DOCREV CUR MEDS BY ELIG CLIN: HCPCS | Performed by: INTERNAL MEDICINE

## 2020-09-25 NOTE — PATIENT INSTRUCTIONS
Testing Echo/ Nuclear Stress Please call [facility] scheduling at [phone]  to schedule an appointment.   
All testing is completed at [place] [id number] 
 
**call office 5-7 days after testing for results**

## 2020-09-25 NOTE — PROGRESS NOTES
[facility]    Mr. Mariama Adams is 59-year-old male with borderline hypertension, degenerative joint disease    Patient is here today for initial cardiac evaluation. Patient recently went to emergency department after having chest discomfort. He denies any prior history of myocardial infarction or congestive heart failure. He said he was at work and he felt like he had a chest tightness which lasted for about an hour. It was associated with some sweating. He denied any nausea or vomiting. He works at a [employer] as a  person. During ER visit he was found to have some nonspecific ST-T changes and he was asked to follow-up with cardiology team.  Since then he did not have any chest pain or chest tightness. On a rare occasion he would have some fluttering sensation in the chest however this happens a few times a year. He does not complain of any presyncope or syncope. Denies any nausea, vomiting, abdominal pain, fever, chills, sputum production. No hematuria or other bleeding complaints    Past Medical History:   Diagnosis Date    Arthritis     Chronic pain     left hip and lower back    Hypertension     Left hip pain     resolved since surgery    Thromboembolus ([facility] [address] 1975    [facility], [facility] Wears glasses        IMPRESSION & PLAN:  Osito Dumont is 59-year-old male with borderline hypertension    Chest pain:  Mixed feature for angina. I recommend nuclear stress test for risk stratification especially because his advanced age. Will order echo to rule out structural abnormality of the heart or pulmonary hypertension    Hypertension:  Blood pressure today's 150/82 mmHg. He is not on any blood pressure medication. Repeat blood pressure was 142/70. We discussed today about salt restriction and dietary modification.   If blood pressure continues to be elevated on next visit, should consider antihypertensive medication    This plan was discussed with patient who is in agreement. Thank you for allowing me to participate in patient care. Please feel free to call me if you have any question or concern. EKG  Sinus rhythm at 71 bpm.  Premature atrial contraction. Nonspecific ST-T changes. ECHO    STRESS TEST (EST, PHARM, NUC, ECHO etc)    CATHETERIZATION    Physical Exam:  BP Readings from Last 3 Encounters:   09/25/20 (!) 150/82   09/04/20 140/58   09/02/20 168/72         Pulse Readings from Last 3 Encounters:   09/25/20 63   09/04/20 60   09/02/20 (!) 59          Wt Readings from Last 3 Encounters:   09/25/20 74.8 kg (165 lb)   09/10/20 73.9 kg (163 lb)   09/04/20 73.9 kg (163 lb)       Constitutional: Oriented to person, place, and time. HENT: Head: Normocephalic and atraumatic. Eyes: Conjunctivae and extraocular motions are normal.   Neck: No JVD present. Carotid bruit is not appreciated. Cardiovascular: Regular rhythm. No murmur, gallop or rubs appreciated  Lung: Breath sounds normal. No respiratory distress. No ronchi or rales appreciated  Abdominal: No tenderness. No rebound and no guarding. Musculoskeletal: There is no lower extremity edema. No cynosis  Lymphadenopathy:  No cervical or supraclavicular adenopathy appriciated. Neurological: No gross motor deficit noted. Skin: No visible skin rash noted. No Ear discharge noted  Psychiatric: Normal mood and affect.    Good distal pulse    Review of Data  LABS:   Lab Results   Component Value Date/Time    Sodium 137 09/02/2020 10:36 AM    Potassium 3.6 09/02/2020 10:36 AM    Chloride 106 09/02/2020 10:36 AM    CO2 27 09/02/2020 10:36 AM    Glucose 104 (H) 09/02/2020 10:36 AM    BUN 26 (H) 09/02/2020 10:36 AM    Creatinine 1.33 (H) 09/02/2020 10:36 AM     Lipids Latest Ref Rng & Units 2/14/2020 2/14/2019   Chol, Total 100 - 199 mg/dL 182 170   HDL >39 mg/dL 63 65   LDL 0 - 99 mg/dL 102(H) 92   Trig 0 - 149 mg/dL 87 66   Some recent data might be hidden     Lab Results Component Value Date/Time    ALT (SGPT) 15 02/14/2020 12:00 AM     Lab Results   Component Value Date/Time    Hemoglobin A1c 6.0 (H) 02/14/2020 12:00 AM    Hemoglobin A1c (POC) 5.6 08/20/2020 08:11 AM       Current Outpatient Medications   Medication Sig    aspirin 81 mg chewable tablet Take 81 mg by mouth daily.  ferrous sulfate 325 mg (65 mg iron) tablet Take 1 Tab by mouth Daily (before breakfast).  pregabalin (LYRICA) 100 mg capsule Take 1 Cap by mouth two (2) times a day. Max Daily Amount: 200 mg. Dx: spinal stenosis    lidocaine (LIDODERM) 5 % Apply patch to the affected area for 12 hours a day and remove for 12 hours a day.  cyanocobalamin (Vitamin B-12) 1,000 mcg tablet Take 1 Tab by mouth daily.  pantoprazole (PROTONIX) 40 mg tablet TAKE 1 TABLET EVERY DAY    diclofenac (VOLTAREN) 1 % gel Apply  to affected area four (4) times daily.  tamsulosin (FLOMAX) 0.4 mg capsule take 1 capsule by mouth once daily     No current facility-administered medications for this visit. Review of Systems:  Cardiac symptoms as noted above in HPI. All others negative. Denies fatigue, malaise, skin rash, joint pain, blurring vision, photophobia, neck pain, hemoptysis, chronic cough, nausea, vomiting, hematuria, burning micturition, BRBPR, chronic headaches.     Past Surgical History:   Procedure Laterality Date    FOOT/TOES SURGERY PROC UNLISTED      HX HIP REPLACEMENT Right     HX OTHER SURGICAL  1975    brain surgery - says he hit his head and then had headaches and had to have surgery,  Dr. Tim Del Castillo Left 6/2015       Allergies and Sensitivities:  No Known Allergies    Family History:  Family History   Problem Relation Age of Onset    Cancer Mother     Heart Attack Daughter        Social History:  Social History     Tobacco Use    Smoking status: Former Smoker     Packs/day: 1.00     Years: 25.00     Pack years: 25.00     Last attempt to quit: 6/1/1990     Years since quittin.3    Smokeless tobacco: Never Used   Substance Use Topics    Alcohol use: No     Frequency: Never    Drug use: No     He  reports that he quit smoking about 30 years ago. He has a 25.00 pack-year smoking history. He has never used smokeless tobacco.  He  reports no history of alcohol use. Rich Bach MD  Please note: This document has been produced using voice recognition software. Unrecognized errors in transcription may be present.

## 2020-09-25 NOTE — PROGRESS NOTES
Ilana Roger presents today for   Chief Complaint   [facility] with   [facility]     ER for Chest pain on 09/02/2020    Chest Pain (Angina)     left side with exertion lasted one hour before going to ER       Ilana Roger preferred language for health care discussion is english/other. Is someone accompanying this pt? no    Is the patient using any DME equipment during [address]? no    Depression Screening:  3 most recent PHQ Screens 9/4/2020   Little interest or pleasure in doing things Not at all   Feeling down, depressed, irritable, or hopeless Not at all   Total Score PHQ 2 0       Learning Assessment:  Learning Assessment 9/4/2020   PRIMARY LEARNER Patient   HIGHEST LEVEL OF EDUCATION - PRIMARY LEARNER  -   BARRIERS PRIMARY LEARNER -   CO-LEARNER CAREGIVER -   PRIMARY LANGUAGE ENGLISH   LEARNER PREFERENCE PRIMARY DEMONSTRATION   ANSWERED BY patient   RELATIONSHIP SELF       Abuse Screening:  Abuse Screening Questionnaire 9/4/2020   Do you ever feel afraid of your partner? N   Are you in a relationship with someone who physically or mentally threatens you? N   Is it safe for you to go home? Y       Fall Risk  Fall Risk Assessment, last 12 mths 9/4/2020   Able to walk? Yes   Fall in past 12 months? No   Fall with injury? -   Number of falls in past 12 months -   Fall Risk Score -       Pt currently taking Anticoagulant therapy? no    Coordination of Care:  1. Have you been to the ER, urgent care clinic since your last visit? Hospitalized since your last visit? yes    2. Have you seen or consulted any other health care providers outside of the [address] [place] [address] [place] since your last visit? Include any pap smears or colon screening.  no

## 2020-10-14 ENCOUNTER — TELEPHONE (OUTPATIENT)
Dept: CARDIOLOGY CLINIC | Age: 85
End: 2020-10-14

## 2020-12-15 NOTE — ED PROVIDER NOTES
44-year-old male presents for evaluation of waxing waning left-sided chest pain. Woke him from sleep approximate midnight last night. Pain is described as \"tightness,\" waxing waning in intensity, without radiation, diaphoresis, or shortness of breath. No belching or burping. CAD risk factors are negative. Denies prior episodes of chest pain. No active pain currently. Patient takes low-dose aspirin daily and took usual dose this morning.            Past Medical History:   Diagnosis Date    Arthritis     Chronic pain     left hip and lower back    Hypertension     Left hip pain     resolved since surgery    Thromboembolus ([address]     [facility]    Wears glasses        Past Surgical History:   Procedure Laterality Date    FOOT/TOES SURGERY PROC UNLISTED      HX HIP REPLACEMENT Right     HX OTHER SURGICAL      brain surgery - says he hit his head and then had headaches and had to have surgery,  Dr. Vale Fan Left 2015         Family History:   Problem Relation Age of Onset    Cancer Mother     Heart Attack Daughter        Social History     Socioeconomic History    Marital status:      Spouse name: Not on file    Number of children: Not on file    Years of education: Not on file    Highest education level: Not on file   Occupational History    Not on file   Social Needs    Financial resource strain: Not on file    Food insecurity     Worry: Not on file     Inability: Not on file    Transportation needs     Medical: Not on file     Non-medical: Not on file   Tobacco Use    Smoking status: Former Smoker     Packs/day: 1.00     Years: 25.00     Pack years: 25.00     Last attempt to quit: 1990     Years since quittin.2    Smokeless tobacco: Never Used   Substance and Sexual Activity    Alcohol use: No     Frequency: Never    Drug use: No    Sexual activity: Not Currently     Partners: Female   Lifestyle    Physical activity     Days per week: Not Care Suites Admission Nursing Note    Patient Information  Name: Lu Smith  Age: 68 year old  Reason for admission: IR Bilateral Thoracentesis   Bilateral tunneled pleural catheter placement   Care Suites arrival time: 0750        Patient Admission/Assessment   Pre-procedure assessment complete: Yes  If abnormal assessment/labs, provider notified: Yes  INR 1.15  NPO: Yes  Medications held per instructions/orders: N/A  Consent: obtained  Patient oriented to room: Yes  Education/questions answered: Yes  Plan/other: To IR for Bilat. Tunneled pleural catheter  And bilat. Thoracentesis   Discharge Planning  Discharge name/phone number: Carol hoyos  [phone]  Overnight post sedation caregiver: sister  Discharge location: home    Rd Sanchez RN          on file     Minutes per session: Not on file    Stress: Not on file   Relationships    Social connections     Talks on phone: Not on file     Gets together: Not on file     Attends [employer] service: Not on file     Active member of club or organization: Not on file     Attends meetings of clubs or organizations: Not on file     Relationship status: Not on file    Intimate partner violence     Fear of current or ex partner: Not on file     Emotionally abused: Not on file     Physically abused: Not on file     Forced sexual activity: Not on file   Other Topics Concern    Not on file   Social History Narrative    Not on file         ALLERGIES: Patient has no known allergies. Review of Systems   Respiratory: Positive for chest tightness. Cardiovascular: Positive for chest pain. Gastrointestinal: Negative for abdominal pain. All other systems reviewed and are negative. Vitals:    09/02/20 1333 09/02/20 1335 09/02/20 1400 09/02/20 1430   BP: 169/80  177/75 168/72   Pulse:  63 (!) 55 (!) 59   Resp:  14 10 12   Temp:       SpO2:  99% 99% 99%   Weight:       Height:                Physical Exam  Vitals signs and nursing note reviewed. Constitutional:       General: He is not in acute distress. Appearance: He is well-developed. He is not diaphoretic. HENT:      Head: Normocephalic and atraumatic. Nose: Nose normal.   Eyes:      General: No scleral icterus. Right eye: No discharge. Left eye: No discharge. Neck:      Musculoskeletal: Neck supple. Vascular: No JVD. Cardiovascular:      Rate and Rhythm: Normal rate and regular rhythm. Heart sounds: Normal heart sounds. No murmur. No friction rub. No gallop. Pulmonary:      Effort: Pulmonary effort is normal. No respiratory distress. Breath sounds: Normal breath sounds. No wheezing or rales. Chest:      Chest wall: No tenderness. Abdominal:      Palpations: Abdomen is soft. Tenderness:  There is no abdominal tenderness. There is no guarding or rebound. Musculoskeletal:      Right lower leg: No edema. Left lower leg: No edema. Skin:     General: Skin is warm and dry. Findings: No rash. Neurological:      Mental Status: He is alert and oriented to person, place, and time. Motor: No abnormal muscle tone. Coordination: Coordination normal.       Recent Results (from the past 12 hour(s))   EKG, 12 LEAD, INITIAL    Collection Time: 09/02/20 10:23 AM   Result Value Ref Range    Ventricular Rate 71 BPM    Atrial Rate 71 BPM    P-R Interval 154 ms    QRS Duration 76 ms    Q-T Interval 400 ms    QTC Calculation (Bezet) 434 ms    Calculated P Axis 70 degrees    Calculated R Axis 1 degrees    Diagnosis       Sinus rhythm with premature atrial complexes  Nonspecific ST abnormality  When compared with ECG of 28-FEB-2019 20:39,  premature atrial complexes are now present  Confirmed by Candis Villa MD, Edward Bhatia ([id number]) on 9/2/2020 2:49:07 PM     CBC WITH AUTOMATED DIFF    Collection Time: 09/02/20 10:36 AM   Result Value Ref Range    WBC 6.3 4.6 - 13.2 K/uL    RBC 4.03 (L) 4.70 - 5.50 M/uL    HGB 12.4 (L) 13.0 - 16.0 g/dL    HCT 37.9 36.0 - 48.0 %    MCV 94.0 74.0 - 97.0 FL    MCH 30.8 24.0 - 34.0 PG    MCHC 32.7 31.0 - 37.0 g/dL    RDW 13.2 11.6 - 14.5 %    PLATELET 966 025 - 454 K/uL    MPV 12.1 (H) 9.2 - 11.8 FL    NEUTROPHILS 67 40 - 73 %    LYMPHOCYTES 16 (L) 21 - 52 %    MONOCYTES 11 (H) 3 - 10 %    EOSINOPHILS 6 (H) 0 - 5 %    BASOPHILS 0 0 - 2 %    ABS. NEUTROPHILS 4.2 1.8 - 8.0 K/UL    ABS. LYMPHOCYTES 1.0 0.9 - 3.6 K/UL    ABS. MONOCYTES 0.7 0.05 - 1.2 K/UL    ABS. EOSINOPHILS 0.4 0.0 - 0.4 K/UL    ABS.  BASOPHILS 0.0 0.0 - 0.1 K/UL    DF AUTOMATED     METABOLIC PANEL, BASIC    Collection Time: 09/02/20 10:36 AM   Result Value Ref Range    Sodium 137 136 - 145 mmol/L    Potassium 3.6 3.5 - 5.5 mmol/L    Chloride 106 100 - 111 mmol/L    CO2 27 21 - 32 mmol/L    Anion gap 4 3.0 - 18 mmol/L    Glucose 104 (H) 74 - 99 mg/dL    BUN 26 (H) 7.0 - 18 MG/DL    Creatinine 1.33 (H) 0.6 - 1.3 MG/DL    BUN/Creatinine ratio 20 12 - 20      GFR est AA >60 >60 ml/min/1.73m2    GFR est non-AA 51 (L) >60 ml/min/1.73m2    Calcium 8.7 8.5 - 10.1 MG/DL   TROPONIN I    Collection Time: 09/02/20 10:36 AM   Result Value Ref Range    Troponin-I, QT 0.03 0.0 - 0.045 NG/ML   MAGNESIUM    Collection Time: 09/02/20 10:36 AM   Result Value Ref Range    Magnesium 1.7 1.6 - 2.6 mg/dL   TROPONIN I    Collection Time: 09/02/20  2:18 PM   Result Value Ref Range    Troponin-I, QT 0.03 0.0 - 0.045 NG/ML        EKG normal sinus rhythm, rate 71 with PACs. Nonspecific inferior ST-T wave change. Anterior Q waves no ST segment elevations. Unchanged 2/28/19. XR CHEST PA LAT   Final Result   IMPRESSION[de-identified]      1.  No acute cardiopulmonary disease. MDM  Number of Diagnoses or Management Options  Atypical chest pain:   Diagnosis management comments: Impression: Atypical chest pain. Patient reevaluated with serial troponins. Consider ACS, non-STEMI, chest wall pain, atypical GERD, pneumonia. Unlikely PE given absence of risk factors or dyspnea. Initial EKG negative for acute ischemic change. Serial troponin x2 obtained. 3:57 PM  Troponin negative x2. No ongoing chest pain. EKG unchanged. Results reviewed and discussed with patient. Appears stable for cardiology follow-up. Precautions reviewed with low threshold to return emergency department for recurrent symptoms. Procedures    Diagnosis:   1. Atypical chest pain        1. EKG is unchanged from prior. No findings of heart attack. 2.  Heart muscle enzyme studies were normal x2 (troponin). This rules out a heart attack but does not rule out the possibility of angina. 3.  Follow-up with cardiology for additional outpatient work-up, next available appointment. 4.  Continue aspirin therapy daily as discussed.   5.  Return to the emergency department for recurrent pain, shortness of breath, or other acutely worsening symptoms. Disposition: home    Follow-up Information     Follow up With Specialties Details Why Contact Info    Manisha Zepeda MD Cardiology Schedule an appointment as soon as possible for a visit next available for additional outpatient work up as clinically indicated Zachary Mantilla [facility]  [phone] [address] [facility] EMERGENCY DEPT Emergency Medicine  As needed, If symptoms worsen [address] [facility]  [phone]          Patient's Medications   Start Taking    No medications on file   Continue Taking    CYANOCOBALAMIN (VITAMIN B-12) 1,000 MCG TABLET    Take 1 Tab by mouth daily. DICLOFENAC (VOLTAREN) 1 % GEL    Apply  to affected area four (4) times daily. FERROUS SULFATE 325 MG (65 MG IRON) TABLET    Take 1 Tab by mouth Daily (before breakfast). LIDOCAINE (LIDODERM) 5 %    Apply patch to the affected area for 12 hours a day and remove for 12 hours a day. PANTOPRAZOLE (PROTONIX) 40 MG TABLET    TAKE 1 TABLET EVERY DAY    PREGABALIN (LYRICA) 100 MG CAPSULE    Take 1 Cap by mouth two (2) times a day. Max Daily Amount: 200 mg.  Dx: spinal stenosis    TAMSULOSIN (FLOMAX) 0.4 MG CAPSULE    take 1 capsule by mouth once daily   These Medications have changed    No medications on file   Stop Taking    No medications on file

## 2021-02-01 ENCOUNTER — TELEPHONE (OUTPATIENT)
Dept: FAMILY MEDICINE CLINIC | Age: 86
End: 2021-02-01

## 2021-02-01 NOTE — TELEPHONE ENCOUNTER
Pt received the results of his covid 19 test on 1/29/2021 that were positive from [facility] and he needs a letter for his work stating that he is positive and needs to be quarantined. louis is on his [employer] . Please advise.

## 2021-02-01 NOTE — LETTER
2/2/2021 8:28 AM 
 
Mr. Lara Brock [address] [facility] [id number] [place] [address] To Whom It May concern: 
  
 
Lara Brock needs to abide by 14 day quarantine rules due his positive COVID-19 test. 
 
If there are questions or concerns please have the patient contact our office. Sincerely, Diana Conde MD

## 2021-02-01 NOTE — LETTER
NOTIFICATION RETURN TO WORK / SCHOOL 
 
2/2/2021 8:39 AM 
 
Mr. Richmond Lofton [address] [facility] [address] [id number] [place] [phone] To Whom It May concern: 
  
 
Richmond Lofton needs to abide by 14 day quarantine rules due his positive COVID-19 test. 
 
He can return to work: 2/13/2021 If there are questions or concerns please have the patient contact our office. Sincerely, Nagi Llamas MD

## 2021-02-08 ENCOUNTER — TELEPHONE (OUTPATIENT)
Dept: FAMILY MEDICINE CLINIC | Age: 86
End: 2021-02-08

## 2021-02-08 NOTE — TELEPHONE ENCOUNTER
He is still coughing and he says he feels fatigued. He gets winded when he is exerted. He is comfortable at rest.  No shortness of breath or chest pains. She is concerned about his return to work coming up in 4 days. We extended this for 2 more weeks given his age and recovery time. Subjective:      Patient ID: Chloe Foster is a 89 y.o. female.    Chief Complaint:  Hypothyroidism      The patient location is: Home  The chief complaint leading to consultation is: hypothyroidism, thyroid nodule    Visit type: audiovisual    Face to Face time with patient: 12 min  20 minutes of total time spent on the encounter, which includes face to face time and non-face to face time preparing to see the patient (eg, review of tests), Obtaining and/or reviewing separately obtained history, Documenting clinical information in the electronic or other health record, Independently interpreting results (not separately reported) and communicating results to the patient/family/caregiver, or Care coordination (not separately reported).         Each patient to whom he or she provides medical services by telemedicine is:  (1) informed of the relationship between the physician and patient and the respective role of any other health care provider with respect to management of the patient; and (2) notified that he or she may decline to receive medical services by telemedicine and may withdraw from such care at any time.      History of Present Illness  Hypothyroidism  Ms. Foster is a very pleasant female patient with Postablative hypothyroidism.      Last office visit in 06/2019.     She is s/p I-131 for treatment of Hyperthyroidism  The patient received 24.8 mCi of I-131 on 06/14/2012      She is currently taking Levothyroxine 75 mcg daily   The patient endorses compliance with taking LT4 as prescribed. She takes it on an empty stomach with water and waits ~ 1 hour before eating or taking other medications     No overt fatigue. No constipation.   Denies palpitations or tremor.        Thyroid Nodules  Patient has history of right lobe nodule.  Denies dysphagia, hoarseness or voice changes.  There is no a history of radiation to the neck, head or face.      Last thyroid ultrasound 10/2018:  COMPARISON:  Neck ultrasound  dated 01/06/2016.  When compared to the prior study the above nodule is stable in size and features.  Our records indicate previously benign FNA of the right/isthmus nodule in 2012 and 2015.     IMPRESSION:      1.) Thyroid gland is normal in size with heterogeneous echotexture and normal vascularity     2.) 1.67 x 0.79 x 0.97 cm solid, heterogeneous predominately hypoechoic nodule seen in the right/isthmus junction     Family history of thyroid disease:   Daughter had joey-thyroidectomy  Son total thyroidectomy  Father total thyroidectomy    Review of Systems   Constitutional: Negative for chills and fever.   Gastrointestinal: Negative for nausea.   No CP   No SOB    Objective:   Physical Exam  Vitals signs and nursing note reviewed.   Constitutional:       Appearance: Normal appearance.       BP Readings from Last 3 Encounters:   08/19/20 120/71   08/04/20 124/71   05/25/20 123/63     Wt Readings from Last 1 Encounters:   08/19/20 1445 81.5 kg (179 lb 10.8 oz)       There is no height or weight on file to calculate BMI.    Lab Review:   Lab Results   Component Value Date    HGBA1C 5.6 03/13/2020     Lab Results   Component Value Date    CHOL 148 03/13/2020    HDL 46 03/13/2020    LDLCALC 83.8 03/13/2020    TRIG 91 03/13/2020    CHOLHDL 31.1 03/13/2020     Lab Results   Component Value Date     06/04/2020    K 4.4 06/04/2020     06/04/2020    CO2 25 06/04/2020    GLU 73 06/04/2020    BUN 28 (H) 06/04/2020    CREATININE 0.94 06/04/2020    CALCIUM 9.6 06/04/2020    PROT 7.1 06/04/2020    ALBUMIN 4.4 06/04/2020    BILITOT 0.6 06/04/2020    ALKPHOS 65 06/04/2020    AST 30 06/04/2020    ALT 17 06/04/2020    ANIONGAP 12 06/04/2020    ESTGFRAFRICA >60.0 06/04/2020    EGFRNONAA 53.2 (A) 06/04/2020    TSH 3.180 06/04/2020         Assessment and Plan     Postablative hypothyroidism  --patient presenting with postablative hypothyroidism  --Clinically and biochemically euthyroid  --Will continue levothyroxine 75  mcg once daily (refill sent to pharmacy)    Nontoxic multinodular goiter  --patient with history of thyroid nodule  --No risk factors for thyroid cancer  --No compressive symptoms  --Had benign FNA of right lobe nodule in 2015  --The nodule had not changed significantly on repeat ultrasound in 2018  --Will repeat thyroid ultrasound now      Thyroid ultrasound when able, follow up in one year with labs prior to appt      Chapo Awan M.D. Staff Endocrinology

## 2021-02-08 NOTE — LETTER
2/8/2021 12:35 PM 
 
Mr. Jacquelin [address] [place] [id number] [place] [id number] To Whom It May Concern, Please excuse Mr. Usha Mendez for more time due to the need for recovery from COVID-19. At his age, recovery will be longer. He can return to work March 1st. 
 
 
Sincerely, Renate Mckee MD

## 2021-02-08 NOTE — TELEPHONE ENCOUNTER
Pt tested positive for covid on 1/28 at [facility]. Pt is feeling short of breath, has a dry cough, and congestion. Pt's granddaughter states he gets winded when walking from his room to the bathroom or any short distance. Pt's granddaughter states the pt will wake up from naps sweating but no fever. Please advise.

## 2021-02-23 ENCOUNTER — TELEPHONE (OUTPATIENT)
Dept: FAMILY MEDICINE CLINIC | Age: 86
End: 2021-02-23

## 2021-02-23 NOTE — TELEPHONE ENCOUNTER
Jey Meyer from [facility] called to schedule a pre op appt.    Surgery 3/5/2021  Dr Selma Wetzel  Combined cataract and glaucoma procedure  Call Evonne at [phone], press option 5

## 2021-02-24 ENCOUNTER — HOSPITAL ENCOUNTER (OUTPATIENT)
Dept: LAB | Age: 86
Discharge: HOME | End: 2021-02-24
Payer: MEDICARE

## 2021-02-24 DIAGNOSIS — D64.9 ANEMIA, UNSPECIFIED TYPE: ICD-10-CM

## 2021-02-24 DIAGNOSIS — R73.01 IFG (IMPAIRED FASTING GLUCOSE): ICD-10-CM

## 2021-02-24 DIAGNOSIS — E53.8 VITAMIN B12 DEFICIENCY: ICD-10-CM

## 2021-02-24 PROBLEM — R55 NEAR SYNCOPE: Status: ACTIVE | Noted: 2017-08-11

## 2021-02-24 LAB
ALBUMIN SERPL-MCNC: 3.6 G/DL (ref 3.4–5)
ALBUMIN/GLOB SERPL: 0.8 {RATIO} (ref 0.8–1.7)
ALP SERPL-CCNC: 103 U/L (ref 45–117)
ALT SERPL-CCNC: 28 U/L (ref 16–61)
ANION GAP SERPL CALC-SCNC: 6 MMOL/L (ref 3–18)
AST SERPL-CCNC: 19 U/L (ref 10–38)
BASOPHILS # BLD: 0 K/UL (ref 0–0.1)
BASOPHILS NFR BLD: 0 % (ref 0–2)
BILIRUB SERPL-MCNC: 0.6 MG/DL (ref 0.2–1)
BUN SERPL-MCNC: 19 MG/DL (ref 7–18)
BUN/CREAT SERPL: 15 (ref 12–20)
CALCIUM SERPL-MCNC: 9.5 MG/DL (ref 8.5–10.1)
CHLORIDE SERPL-SCNC: 107 MMOL/L (ref 100–111)
CO2 SERPL-SCNC: 27 MMOL/L (ref 21–32)
CREAT SERPL-MCNC: 1.23 MG/DL (ref 0.6–1.3)
DIFFERENTIAL METHOD BLD: ABNORMAL
EOSINOPHIL # BLD: 0.1 K/UL (ref 0–0.4)
EOSINOPHIL NFR BLD: 2 % (ref 0–5)
ERYTHROCYTE [DISTWIDTH] IN BLOOD BY AUTOMATED COUNT: 13.3 % (ref 11.6–14.5)
FERRITIN SERPL-MCNC: 199 NG/ML (ref 8–388)
GLOBULIN SER CALC-MCNC: 4.4 G/DL (ref 2–4)
GLUCOSE SERPL-MCNC: 80 MG/DL (ref 74–99)
HBA1C MFR BLD: 5.9 % (ref 4.2–5.6)
HCT VFR BLD AUTO: 40.1 % (ref 36–48)
HGB BLD-MCNC: 13.2 G/DL (ref 13–16)
LYMPHOCYTES # BLD: 0.6 K/UL (ref 0.9–3.6)
LYMPHOCYTES NFR BLD: 8 % (ref 21–52)
MCH RBC QN AUTO: 30.8 PG (ref 24–34)
MCHC RBC AUTO-ENTMCNC: 32.9 G/DL (ref 31–37)
MCV RBC AUTO: 93.5 FL (ref 74–97)
MONOCYTES # BLD: 0.8 K/UL (ref 0.05–1.2)
MONOCYTES NFR BLD: 11 % (ref 3–10)
NEUTS SEG # BLD: 6.3 K/UL (ref 1.8–8)
NEUTS SEG NFR BLD: 79 % (ref 40–73)
PLATELET # BLD AUTO: 297 K/UL (ref 135–420)
PMV BLD AUTO: 12.3 FL (ref 9.2–11.8)
POTASSIUM SERPL-SCNC: 4.4 MMOL/L (ref 3.5–5.5)
PROT SERPL-MCNC: 8 G/DL (ref 6.4–8.2)
RBC # BLD AUTO: 4.29 M/UL (ref 4.7–5.5)
SODIUM SERPL-SCNC: 140 MMOL/L (ref 136–145)
VIT B12 SERPL-MCNC: 828 PG/ML (ref 211–911)
WBC # BLD AUTO: 7.9 K/UL (ref 4.6–13.2)

## 2021-02-24 PROCEDURE — 82607 VITAMIN B-12: CPT

## 2021-02-24 PROCEDURE — 83036 HEMOGLOBIN GLYCOSYLATED A1C: CPT

## 2021-02-24 PROCEDURE — 36415 COLL VENOUS BLD VENIPUNCTURE: CPT

## 2021-02-24 PROCEDURE — 85025 COMPLETE CBC W/AUTO DIFF WBC: CPT

## 2021-02-24 PROCEDURE — 80053 COMPREHEN METABOLIC PANEL: CPT

## 2021-02-24 PROCEDURE — 82728 ASSAY OF FERRITIN: CPT

## 2021-02-24 NOTE — TELEPHONE ENCOUNTER
Spoke with Rai Haynes at [facility]. Patient already has a routine f/up appointment scheduled for 2/25/2021. He will keep that appointment for pre op. Patient reminded to have labs done today for upcoming appointment.

## 2021-02-25 ENCOUNTER — OFFICE VISIT (OUTPATIENT)
Dept: FAMILY MEDICINE CLINIC | Age: 86
End: 2021-02-25
Payer: MEDICARE

## 2021-02-25 VITALS
OXYGEN SATURATION: 98 % | HEART RATE: 62 BPM | BODY MASS INDEX: 23.1 KG/M2 | TEMPERATURE: 98 F | WEIGHT: 165 LBS | DIASTOLIC BLOOD PRESSURE: 72 MMHG | SYSTOLIC BLOOD PRESSURE: 138 MMHG | HEIGHT: 71 IN | RESPIRATION RATE: 14 BRPM

## 2021-02-25 DIAGNOSIS — E53.8 VITAMIN B12 DEFICIENCY: ICD-10-CM

## 2021-02-25 DIAGNOSIS — R73.01 IFG (IMPAIRED FASTING GLUCOSE): ICD-10-CM

## 2021-02-25 DIAGNOSIS — Z87.19 HISTORY OF ESOPHAGEAL ULCER: ICD-10-CM

## 2021-02-25 DIAGNOSIS — N40.1 BENIGN LOCALIZED PROSTATIC HYPERPLASIA WITH LOWER URINARY TRACT SYMPTOMS (LUTS): ICD-10-CM

## 2021-02-25 DIAGNOSIS — D64.9 ANEMIA, UNSPECIFIED TYPE: ICD-10-CM

## 2021-02-25 DIAGNOSIS — M48.061 SPINAL STENOSIS OF LUMBAR REGION, UNSPECIFIED WHETHER NEUROGENIC CLAUDICATION PRESENT: ICD-10-CM

## 2021-02-25 DIAGNOSIS — I10 ESSENTIAL HYPERTENSION: Primary | ICD-10-CM

## 2021-02-25 PROCEDURE — G8427 DOCREV CUR MEDS BY ELIG CLIN: HCPCS | Performed by: FAMILY MEDICINE

## 2021-02-25 PROCEDURE — 99214 OFFICE O/P EST MOD 30 MIN: CPT | Performed by: FAMILY MEDICINE

## 2021-02-25 PROCEDURE — G8536 NO DOC ELDER MAL SCRN: HCPCS | Performed by: FAMILY MEDICINE

## 2021-02-25 PROCEDURE — G8510 SCR DEP NEG, NO PLAN REQD: HCPCS | Performed by: FAMILY MEDICINE

## 2021-02-25 PROCEDURE — G8420 CALC BMI NORM PARAMETERS: HCPCS | Performed by: FAMILY MEDICINE

## 2021-02-25 PROCEDURE — 1101F PT FALLS ASSESS-DOCD LE1/YR: CPT | Performed by: FAMILY MEDICINE

## 2021-02-25 RX ORDER — LANOLIN ALCOHOL/MO/W.PET/CERES
325 CREAM (GRAM) TOPICAL
Qty: 90 TAB | Refills: 1 | Status: SHIPPED | OUTPATIENT
Start: 2021-02-25 | End: 2021-09-02 | Stop reason: SDUPTHER

## 2021-02-25 RX ORDER — TAMSULOSIN HYDROCHLORIDE 0.4 MG/1
CAPSULE ORAL
Qty: 90 CAP | Refills: 0 | Status: SHIPPED | OUTPATIENT
Start: 2021-02-25 | End: 2021-07-22 | Stop reason: SDUPTHER

## 2021-02-25 RX ORDER — LANOLIN ALCOHOL/MO/W.PET/CERES
1000 CREAM (GRAM) TOPICAL DAILY
Qty: 90 TAB | Refills: 3 | Status: SHIPPED | OUTPATIENT
Start: 2021-02-25 | End: 2022-03-03 | Stop reason: SDUPTHER

## 2021-02-25 RX ORDER — PANTOPRAZOLE SODIUM 40 MG/1
TABLET, DELAYED RELEASE ORAL
Qty: 90 TAB | Refills: 1 | Status: SHIPPED | OUTPATIENT
Start: 2021-02-25 | End: 2021-04-09

## 2021-02-25 RX ORDER — PREGABALIN 100 MG/1
100 CAPSULE ORAL 2 TIMES DAILY
Qty: 180 CAP | Refills: 1 | Status: SHIPPED | OUTPATIENT
Start: 2021-02-25 | End: 2021-09-02

## 2021-02-25 RX ORDER — ACETAMINOPHEN 325 MG/1
TABLET ORAL
COMMUNITY

## 2021-02-25 NOTE — PATIENT INSTRUCTIONS
A Healthy Lifestyle: Care Instructions Your Care Instructions A healthy lifestyle can help you feel good, stay at a healthy weight, and have plenty of energy for both work and play. A healthy lifestyle is something you can share with your whole family. A healthy lifestyle also can lower your risk for serious health problems, such as high blood pressure, heart disease, and diabetes. You can follow a few steps listed below to improve your health and the health of your family. Follow-up care is a key part of your treatment and safety. Be sure to make and go to all appointments, and call your doctor if you are having problems. It's also a good idea to know your test results and keep a list of the medicines you take. How can you care for yourself at home? · Do not eat too much sugar, fat, or fast foods. You can still have dessert and treats now and then. The goal is moderation. · Start small to improve your eating habits. Pay attention to portion sizes, drink less juice and soda pop, and eat more fruits and vegetables. ? Eat a healthy amount of food. A 3-ounce serving of meat, for example, is about the size of a deck of cards. Fill the rest of your plate with vegetables and whole grains. ? Limit the amount of soda and sports drinks you have every day. Drink more water when you are thirsty. ? Eat at least 5 servings of fruits and vegetables every day. It may seem like a lot, but it is not hard to reach this goal. A serving or helping is 1 piece of fruit, 1 cup of vegetables, or 2 cups of leafy, raw vegetables. Have an apple or some carrot sticks as an afternoon snack instead of a candy bar.  Try to have fruits and/or vegetables at every meal. 
 · Make exercise part of your daily routine. You may want to start with simple activities, such as walking, bicycling, or slow swimming. Try to be active 30 to 60 minutes every day. You do not need to do all 30 to 60 minutes all at once. For example, you can exercise 3 times a day for 10 or 20 minutes. Moderate exercise is safe for most people, but it is always a good idea to talk to your doctor before starting an exercise program. 
· Keep moving. [place] the lawn, work in the garden, or [employer]. Take the stairs instead of the elevator at work. · If you smoke, quit. People who smoke have an increased risk for heart attack, stroke, cancer, and other lung illnesses. Quitting is hard, but there are ways to boost your chance of quitting tobacco for good. ? Use nicotine gum, patches, or lozenges. ? Ask your doctor about stop-smoking programs and medicines. ? Keep trying. In addition to reducing your risk of diseases in the future, you will notice some benefits soon after you stop using tobacco. If you have shortness of breath or asthma symptoms, they will likely get better within a few weeks after you quit. · Limit how much alcohol you drink. Moderate amounts of alcohol (up to 2 drinks a day for men, 1 drink a day for women) are okay. But drinking too much can lead to liver problems, high blood pressure, and other health problems. Family health If you have a family, there are many things you can do together to improve your health. · Eat meals together as a family as often as possible. · Eat healthy foods. This includes fruits, vegetables, lean meats and dairy, and whole grains. · Include your family in your fitness plan. Most people think of activities such as jogging or tennis as the way to fitness, but there are many ways you and your family can be more active. Anything that makes you breathe hard and gets your heart pumping is exercise. Here are some tips: ? Walk to do errands or to take your child to school or the bus. 
? Go for a family bike ride after dinner instead of watching TV. Where can you learn more? Go to http://www.gray.com/ Enter U222 in the search box to learn more about \"A Healthy Lifestyle: Care Instructions. \" Current as of: January 31, 2020               Content Version: 12.6 © 2006-2020 [employer], [employer]. Care instructions adapted under license by [employer] (which disclaims liability or warranty for this information). If you have questions about a medical condition or this instruction, always ask your healthcare professional. [address] any warranty or liability for your use of this information.

## 2021-02-25 NOTE — PROGRESS NOTES
1. Have you been to the ER, urgent care clinic since your last visit? Hospitalized since your last visit? Yes, [facility] for back and head pain    2. Have you seen or consulted any other health care providers outside of the [address] [place] [address] since your last visit? Include any pap smears or colon screening.  No

## 2021-02-25 NOTE — PROGRESS NOTES
SUBJECTIVE  Chief Complaint   Patient presents with   • Pre-op Exam      R cataract removal & glaucoma procedure to be perfomed at [facility] surgery center on 3/5/2021 by Dr. Rosen under topical with MAC sedation   • Hypertension   • GERD     Patient presents for follow-up and clearance / pre-op exam for an eye procedure on 3/5/21 with Dr. Rosen.    He has no complaints.    He has a history of HTN and used to be on norvasc.  The patient denies any chest pain or chest tightness.  Denies any dyspnea upon exertion.      He has been advised by his spine specialist to follow-up with PCP since he is not a surgical candidate.  Says he has occasional breakthrough pain and takes tylenol.  Some relief with lidoderm patches.  Taking lyrica.    He has a history of a bleeding esophageal ulcer.  He has had improvements in his Hg and has had GI follow-up care.  In the process he was diagnosed with a B12 deficiency and was started on vit B12 1000 daily along with iron, both of which he is taking.  He has not had any melena or BRBPR.  He is on PPI therapy but stopped seeing GI.    ROS:  History obtained from the patient   · General: negative for - chills, fever  · HEENT: no sore throat, nasal congestion  · Respiratory: no cough, shortness of breath, or wheezing.  Fully recovered from COVID.    · Cardiovascular: no chest pain, palpitations, or dyspnea on exertion  · Gastrointestinal: no abdominal pain, N/V, change in bowel habits, or black or bloody stools  · Neurological: no numbness, tingling, headache or dizziness  · Endo:  No polyuria or polydipsia.   · : no hematuria, dysuria, frequency, hesitancy, or nocturia.  Diagnosed with right hydrocele.  He says it sometimes hurts.  He is seeing urology and is a bit overdue.  He is asking us for refills of his flomax.    OBJECTIVE    Blood pressure 138/72, pulse 62, temperature 98 °F (36.7 °C), temperature source Temporal, resp. rate 14, height 5' 11\" (1.803 m), weight 165 lb (74.8 kg), SpO2  98 %.  General:  Alert, cooperative, well appearing, in no apparent distress. CV:  The heart sounds are regular in rate and rhythm. There is a normal S1 and S2. There or no murmurs. Lungs: Inspiratory and expiratory efforts are full and unlabored. Lung sounds are clear and equal to auscultation throughout all lung fields without wheezing, rales, or rhonchi. Ext: no swelling. Neuro:  No deficits. Not using walker currently. Psych: normal affect. Mood good. Oriented x 3. Judgement and insight intact. Results for orders placed or performed during the hospital encounter of 02/24/21   CBC WITH AUTOMATED DIFF   Result Value Ref Range    WBC 7.9 4.6 - 13.2 K/uL    RBC 4.29 (L) 4.70 - 5.50 M/uL    HGB 13.2 13.0 - 16.0 g/dL    HCT 40.1 36.0 - 48.0 %    MCV 93.5 74.0 - 97.0 FL    MCH 30.8 24.0 - 34.0 PG    MCHC 32.9 31.0 - 37.0 g/dL    RDW 13.3 11.6 - 14.5 %    PLATELET 498 908 - 021 K/uL    MPV 12.3 (H) 9.2 - 11.8 FL    NEUTROPHILS 79 (H) 40 - 73 %    LYMPHOCYTES 8 (L) 21 - 52 %    MONOCYTES 11 (H) 3 - 10 %    EOSINOPHILS 2 0 - 5 %    BASOPHILS 0 0 - 2 %    ABS. NEUTROPHILS 6.3 1.8 - 8.0 K/UL    ABS. LYMPHOCYTES 0.6 (L) 0.9 - 3.6 K/UL    ABS. MONOCYTES 0.8 0.05 - 1.2 K/UL    ABS. EOSINOPHILS 0.1 0.0 - 0.4 K/UL    ABS. BASOPHILS 0.0 0.0 - 0.1 K/UL    DF AUTOMATED     METABOLIC PANEL, COMPREHENSIVE   Result Value Ref Range    Sodium 140 136 - 145 mmol/L    Potassium 4.4 3.5 - 5.5 mmol/L    Chloride 107 100 - 111 mmol/L    CO2 27 21 - 32 mmol/L    Anion gap 6 3.0 - 18 mmol/L    Glucose 80 74 - 99 mg/dL    BUN 19 (H) 7.0 - 18 MG/DL    Creatinine 1.23 0.6 - 1.3 MG/DL    BUN/Creatinine ratio 15 12 - 20      GFR est AA >60 >60 ml/min/1.73m2    GFR est non-AA 56 (L) >60 ml/min/1.73m2    Calcium 9.5 8.5 - 10.1 MG/DL    Bilirubin, total 0.6 0.2 - 1.0 MG/DL    ALT (SGPT) 28 16 - 61 U/L    AST (SGOT) 19 10 - 38 U/L    Alk.  phosphatase 103 45 - 117 U/L    Protein, total 8.0 6.4 - 8.2 g/dL    Albumin 3.6 3.4 - 5.0 g/dL Globulin 4.4 (H) 2.0 - 4.0 g/dL    A-G Ratio 0.8 0.8 - 1.7     FERRITIN   Result Value Ref Range    Ferritin 199 8 - 388 NG/ML   VITAMIN B12   Result Value Ref Range    Vitamin B12 828 211 - 911 pg/mL   HEMOGLOBIN A1C W/O EAG   Result Value Ref Range    Hemoglobin A1c 5.9 (H) 4.2 - 5.6 %     ASSESSMENT / PLAN    ICD-10-CM ICD-9-CM    1. Essential hypertension  I10 401.9    2. IFG (impaired fasting glucose)  R73.01 790.21    3. Vitamin B12 deficiency  E53.8 266.2    4. Anemia, unspecified type  D64.9 285.9 ferrous sulfate 325 mg (65 mg iron) tablet   5. Spinal stenosis of lumbar region, unspecified whether neurogenic claudication present  M48.061 724.02 pregabalin (LYRICA) 100 mg capsule   6. History of esophageal ulcer  Z87.19 V12.79 pantoprazole (PROTONIX) 40 mg tablet   7. Benign localized prostatic hyperplasia with lower urinary tract symptoms (LUTS)  N40.1 600.21 tamsulosin (FLOMAX) 0.4 mg capsule     599.69      Labs reviewed. HTN - He is no longer on his norvasc and normotensive. We will follow as is. IFG - minimally elevated A1c. B12 def -  Cont B12. Anemia secondary to history of esophageal ulcer- cont iron. Cont vit B12 lifelong. Cont PPI. B12 and ferritin levels are holding well. Spinal stenosis - Cont Lyrica 100mg bid. As needed lidoderm and tylenol. BPH with LUTS - refills of flomax. Cont urology follow-up. All chart history elements were reviewed by me at the time of the visit even though marked at time of note closure. Patient understands our medical plan. Patient has provided input and agrees with goals. Alternatives have been explained and offered. All questions answered. The patient is to call if condition worsens or fails to improve. Follow-up and Dispositions    · Return in about 6 months (around 8/25/2021) for routine care.

## 2021-04-07 ENCOUNTER — HOSPITAL ENCOUNTER (EMERGENCY)
Age: 86
Discharge: HOME | End: 2021-04-07
Attending: EMERGENCY MEDICINE
Payer: MEDICARE

## 2021-04-07 ENCOUNTER — APPOINTMENT (OUTPATIENT)
Dept: ULTRASOUND IMAGING | Age: 86
End: 2021-04-07
Attending: EMERGENCY MEDICINE
Payer: MEDICARE

## 2021-04-07 VITALS
HEART RATE: 72 BPM | TEMPERATURE: 98.1 F | SYSTOLIC BLOOD PRESSURE: 159 MMHG | RESPIRATION RATE: 16 BRPM | BODY MASS INDEX: 24.44 KG/M2 | DIASTOLIC BLOOD PRESSURE: 78 MMHG | HEIGHT: 69 IN | WEIGHT: 165 LBS | OXYGEN SATURATION: 98 %

## 2021-04-07 DIAGNOSIS — N43.3 HYDROCELE, UNSPECIFIED HYDROCELE TYPE: Primary | ICD-10-CM

## 2021-04-07 PROCEDURE — 99282 EMERGENCY DEPT VISIT SF MDM: CPT

## 2021-04-07 PROCEDURE — 93975 VASCULAR STUDY: CPT

## 2021-04-07 RX ORDER — HYDROCODONE BITARTRATE AND ACETAMINOPHEN 5; 325 MG/1; MG/1
1 TABLET ORAL
Qty: 10 TAB | Refills: 0 | Status: SHIPPED | OUTPATIENT
Start: 2021-04-07 | End: 2021-04-10

## 2021-04-07 NOTE — ED TRIAGE NOTES
Patient presents with c/o \"private parts swelling\". He states being evaluated by urology related to complaint. States continued swelling and pain.

## 2021-04-07 NOTE — ED PROVIDER NOTES
EMERGENCY DEPARTMENT HISTORY AND PHYSICAL EXAM    4:44 PM      Date: 4/7/2021  Patient Name: Naveed Modi    History of Presenting Illness     Chief Complaint   Patient presents with    Testicle Swelling         History Provided By: Patient    Additional History (Context): Naveed Modi is a 80 y.o. male with hypertension who presents with testicular swelling for about 2 weeks. Patient saw his urologist Dr. Jose Armando Wild and mentioned that at the time. The swelling has gotten progressively worse. Swelling is on the left side. Pain is sharp, constant, nonradiating, nothing makes better or worse. Patient denies dysuria or hematuria. Patient denies trauma to the area. Patient denies fever, cough, nausea, vomiting or diarrhea. No other symptoms at this time. Shaun Johnson PCP: Tory Otero MD        Current Outpatient Medications   Medication Sig Dispense Refill    HYDROcodone-acetaminophen (NORCO) 5-325 mg per tablet Take 1 Tab by mouth every four (4) hours as needed for Pain for up to 3 days. Max Daily Amount: 6 Tabs. 10 Tab 0    finasteride (PROSCAR) 5 mg tablet Take 1 Tab by mouth daily. 90 Tab 3    pantoprazole (PROTONIX) 40 mg tablet TAKE 1 TABLET EVERY DAY 90 Tab 1    ferrous sulfate 325 mg (65 mg iron) tablet Take 1 Tab by mouth Daily (before breakfast). 90 Tab 1    pregabalin (LYRICA) 100 mg capsule Take 1 Cap by mouth two (2) times a day. Max Daily Amount: 200 mg. Dx: spinal stenosis 180 Cap 1    cyanocobalamin (Vitamin B-12) 1,000 mcg tablet Take 1 Tab by mouth daily. 90 Tab 3    lidocaine (LIDODERM) 5 % Apply patch to the affected area for 12 hours a day and remove for 12 hours a day. 30 Each 11    diclofenac (VOLTAREN) 1 % gel Apply  to affected area four (4) times daily. 100 g 4    BinaxNOW COVID-19 Ag Card kit AS DIRECTED      acetaminophen (TylenoL) 325 mg tablet Take  by mouth every four (4) hours as needed for Pain.       tamsulosin (FLOMAX) 0.4 mg capsule take 1 capsule by mouth once daily 90 Cap 0  aspirin 81 mg chewable tablet Take 81 mg by mouth daily. 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 Alexis Dejesus Left 2015       Family History:  Family History   Problem Relation Age of Onset    Cancer Mother     Heart Attack Daughter        Social History:  Social History     Tobacco Use    Smoking status: Former Smoker     Packs/day: 1.00     Years: 25.00     Pack years: 25.00     Quit date: 1990     Years since quittin.8    Smokeless tobacco: Never Used   Substance Use Topics    Alcohol use: No     Frequency: Never    Drug use: No       Allergies:  No Known Allergies      Review of Systems       Review of Systems   Constitutional: Negative. Negative for chills, diaphoresis and fever. HENT: Negative. Negative for congestion, rhinorrhea and sore throat. Eyes: Negative. Negative for pain, discharge and redness. Respiratory: Negative. Negative for cough, chest tightness, shortness of breath and wheezing. Cardiovascular: Negative. Negative for chest pain. Gastrointestinal: Negative. Negative for abdominal pain, constipation, diarrhea, nausea and vomiting. Genitourinary: Positive for scrotal swelling and testicular pain. Negative for discharge, dysuria, flank pain, frequency, genital sores, hematuria, penile pain, penile swelling and urgency. Musculoskeletal: Negative. Negative for back pain and neck pain. Skin: Negative. Negative for rash.    Neurological: Negative. Negative for syncope, weakness, numbness and headaches. Psychiatric/Behavioral: Negative. All other systems reviewed and are negative. Physical Exam     Visit Vitals  BP (!) 159/78 (BP 1 Location: Left upper arm, BP Patient Position: At rest)   Pulse 72   Temp 98.1 °F (36.7 °C)   Resp 16   Ht 5' 9\" (1.753 m)   Wt 74.8 kg (165 lb)   SpO2 98%   BMI 24.37 kg/m²         Physical Exam  Vitals signs and nursing note reviewed. Constitutional:       General: He is not in acute distress. Appearance: Normal appearance. He is well-developed. He is not ill-appearing, toxic-appearing or diaphoretic. HENT:      Head: Normocephalic and atraumatic. Mouth/Throat:      Pharynx: No oropharyngeal exudate. Eyes:      General: No scleral icterus. Conjunctiva/sclera: Conjunctivae normal.      Pupils: Pupils are equal, round, and reactive to light. Neck:      Musculoskeletal: Normal range of motion and neck supple. Thyroid: No thyromegaly. Vascular: No hepatojugular reflux or JVD. Trachea: No tracheal deviation. Cardiovascular:      Rate and Rhythm: Normal rate and regular rhythm. Pulses: Normal pulses. Radial pulses are 2+ on the right side and 2+ on the left side. Dorsalis pedis pulses are 2+ on the right side and 2+ on the left side. Heart sounds: Normal heart sounds, S1 normal and S2 normal. No murmur. No gallop. No S3 or S4 sounds. Pulmonary:      Effort: Pulmonary effort is normal. No respiratory distress. Breath sounds: Normal breath sounds. No decreased breath sounds, wheezing, rhonchi or rales. Abdominal:      General: Bowel sounds are normal. There is no distension. Palpations: Abdomen is soft. Abdomen is not rigid. There is no mass. Tenderness: There is no abdominal tenderness. There is no guarding or rebound. Negative signs include Guerrero's sign and McBurney's sign. Hernia: A hernia is present.  Hernia is present in the left inguinal area. Genitourinary:     Testes:         Left: Tenderness, swelling and testicular hydrocele present. Epididymis:      Left: Inflamed. Musculoskeletal: Normal range of motion. Lymphadenopathy:      Head:      Right side of head: No submental, submandibular, preauricular or occipital adenopathy. Left side of head: No submental, submandibular, preauricular or occipital adenopathy. Cervical: No cervical adenopathy. Upper Body:      Right upper body: No supraclavicular adenopathy. Left upper body: No supraclavicular adenopathy. Skin:     General: Skin is warm and dry. Findings: No rash. Neurological:      Mental Status: He is alert. He is not disoriented. GCS: GCS eye subscore is 4. GCS verbal subscore is 5. GCS motor subscore is 6. Cranial Nerves: No cranial nerve deficit. Sensory: No sensory deficit. Coordination: Coordination normal.      Gait: Gait normal.      Deep Tendon Reflexes: Reflexes are normal and symmetric. Psychiatric:         Speech: Speech normal.         Behavior: Behavior normal.         Thought Content: Thought content normal.         Judgment: Judgment normal.           Diagnostic Study Results     Labs -  No results found for this or any previous visit (from the past 12 hour(s)). Radiologic Studies -   US SCROTUM/TESTICLES W DOPPLER    (Results Pending)         Medical Decision Making   Provider Notes (Medical Decision Making):  MDM  Number of Diagnoses or Management Options  Diagnosis management comments: Testicular pain       I am the first provider for this patient. I reviewed the vital signs, available nursing notes, past medical history, past surgical history, family history and social history. Vital Signs-Reviewed the patient's vital signs.     Records Reviewed: Nursing Notes (Time of Review: 4:44 PM)    ED Course: Progress Notes, Reevaluation, and Consults:    U/S showed left hodrocele  4:44 PM 4/7/2021        Diagnosis       I have reassessed the patient. Patient is feeling well. Patient will be prescribed Vicodin. Patient was discharged in stable condition. Patient is to return to emergency department if any new or worsening condition. Clinical Impression:   1. Hydrocele, unspecified hydrocele type        Disposition: Discharged home     Follow-up Information     Follow up With Specialties Details Why Contact Info    Zuleyka Quijano MD Family Medicine In 1 week  [address] Jarred Moreno MD Urology In 1 day  [address] [facility]  [phone]               Attestation        Provider Attestation:     I personally performed the services described in the documentation, reviewed the documentation and it accurately and completely records my words and actions utilizing the [address] April 07, 2021 at 6:20 PM - [place], [address]. It is dictated using utilizing voice recognition software. Unfortunately this leads to occasional typographical errors. I apologize in advance if the situation occurs. If questions arise please do not hesitate to contact me or call our department.

## 2021-08-03 PROBLEM — R55 SYNCOPE: Status: RESOLVED | Noted: 2018-02-15 | Resolved: 2021-08-03

## 2021-09-02 ENCOUNTER — OFFICE VISIT (OUTPATIENT)
Dept: FAMILY MEDICINE CLINIC | Age: 86
End: 2021-09-02
Payer: MEDICARE

## 2021-09-02 ENCOUNTER — OFFICE VISIT (OUTPATIENT)
Dept: FAMILY MEDICINE CLINIC | Age: 86
End: 2021-09-02

## 2021-09-02 VITALS
SYSTOLIC BLOOD PRESSURE: 130 MMHG | OXYGEN SATURATION: 98 % | TEMPERATURE: 98.4 F | RESPIRATION RATE: 16 BRPM | WEIGHT: 167 LBS | HEIGHT: 69 IN | DIASTOLIC BLOOD PRESSURE: 80 MMHG | BODY MASS INDEX: 24.73 KG/M2 | HEART RATE: 73 BPM

## 2021-09-02 VITALS
RESPIRATION RATE: 16 BRPM | WEIGHT: 167 LBS | HEIGHT: 69 IN | DIASTOLIC BLOOD PRESSURE: 80 MMHG | BODY MASS INDEX: 24.73 KG/M2 | SYSTOLIC BLOOD PRESSURE: 130 MMHG | HEART RATE: 73 BPM | TEMPERATURE: 98.4 F | OXYGEN SATURATION: 98 %

## 2021-09-02 DIAGNOSIS — D50.9 IRON DEFICIENCY ANEMIA, UNSPECIFIED IRON DEFICIENCY ANEMIA TYPE: ICD-10-CM

## 2021-09-02 DIAGNOSIS — I10 ESSENTIAL HYPERTENSION: ICD-10-CM

## 2021-09-02 DIAGNOSIS — R25.2 MUSCLE CRAMPS: Primary | ICD-10-CM

## 2021-09-02 DIAGNOSIS — N40.1 BENIGN LOCALIZED PROSTATIC HYPERPLASIA WITH LOWER URINARY TRACT SYMPTOMS (LUTS): ICD-10-CM

## 2021-09-02 DIAGNOSIS — Z87.19 HISTORY OF ESOPHAGEAL ULCER: ICD-10-CM

## 2021-09-02 DIAGNOSIS — M48.061 SPINAL STENOSIS OF LUMBAR REGION, UNSPECIFIED WHETHER NEUROGENIC CLAUDICATION PRESENT: ICD-10-CM

## 2021-09-02 DIAGNOSIS — R73.01 IFG (IMPAIRED FASTING GLUCOSE): ICD-10-CM

## 2021-09-02 DIAGNOSIS — Z00.00 MEDICARE ANNUAL WELLNESS VISIT, SUBSEQUENT: Primary | ICD-10-CM

## 2021-09-02 DIAGNOSIS — E53.8 VITAMIN B12 DEFICIENCY: ICD-10-CM

## 2021-09-02 LAB — HBA1C MFR BLD HPLC: 5.2 %

## 2021-09-02 PROCEDURE — 99214 OFFICE O/P EST MOD 30 MIN: CPT | Performed by: FAMILY MEDICINE

## 2021-09-02 PROCEDURE — G8427 DOCREV CUR MEDS BY ELIG CLIN: HCPCS | Performed by: FAMILY MEDICINE

## 2021-09-02 PROCEDURE — 1101F PT FALLS ASSESS-DOCD LE1/YR: CPT | Performed by: FAMILY MEDICINE

## 2021-09-02 PROCEDURE — G8420 CALC BMI NORM PARAMETERS: HCPCS | Performed by: FAMILY MEDICINE

## 2021-09-02 PROCEDURE — 83036 HEMOGLOBIN GLYCOSYLATED A1C: CPT | Performed by: FAMILY MEDICINE

## 2021-09-02 PROCEDURE — G8536 NO DOC ELDER MAL SCRN: HCPCS | Performed by: FAMILY MEDICINE

## 2021-09-02 PROCEDURE — G0439 PPPS, SUBSEQ VISIT: HCPCS | Performed by: FAMILY MEDICINE

## 2021-09-02 PROCEDURE — G8510 SCR DEP NEG, NO PLAN REQD: HCPCS | Performed by: FAMILY MEDICINE

## 2021-09-02 RX ORDER — LANOLIN ALCOHOL/MO/W.PET/CERES
325 CREAM (GRAM) TOPICAL
Qty: 90 TABLET | Refills: 1 | Status: SHIPPED | OUTPATIENT
Start: 2021-09-02 | End: 2022-03-03 | Stop reason: SDUPTHER

## 2021-09-02 NOTE — PATIENT INSTRUCTIONS
Medicare Wellness Visit, Male    The best way to live healthy is to have a lifestyle where you eat a well-balanced diet, exercise regularly, limit alcohol use, and quit all forms of tobacco/nicotine, if applicable. Regular preventive services are another way to keep healthy. Preventive services (vaccines, screening tests, monitoring & exams) can help personalize your care plan, which helps you manage your own care. Screening tests can find health problems at the earliest stages, when they are easiest to treat. [facility] follows the current, evidence-based guidelines published by the [facility] ([facility]) when recommending preventive services for our patients. Because we follow these guidelines, sometimes recommendations change over time as research supports it. (For example, a prostate screening blood test is no longer routinely recommended for men with no symptoms). Of course, you and your doctor may decide to screen more often for some diseases, based on your risk and co-morbidities (chronic disease you are already diagnosed with). Preventive services for you include:  - Medicare offers their members a free annual wellness visit, which is time for you and your primary care provider to discuss and plan for your preventive service needs. Take advantage of this benefit every year!  -All adults over age 72 should receive the recommended pneumonia vaccines. Current USPSTF guidelines recommend a series of two vaccines for the best pneumonia protection.   -All adults should have a flu vaccine yearly and tetanus vaccine every 10 years.  -All adults age 48 and older should receive the shingles vaccines (series of two vaccines).        -All adults age 38-68 who are overweight should have a diabetes screening test once every three years.   -Other screening tests & preventive services for persons with diabetes include: an eye exam to screen for diabetic retinopathy, a kidney function test, a foot exam, and stricter control over your cholesterol.   -Cardiovascular screening for adults with routine risk involves an electrocardiogram (ECG) at intervals determined by the provider.   -Colorectal cancer screening should be done for adults age 54-65 with no increased risk factors for colorectal cancer. There are a number of acceptable methods of screening for this type of cancer. Each test has its own benefits and drawbacks. Discuss with your provider what is most appropriate for you during your annual wellness visit. The different tests include: colonoscopy (considered the best screening method), a fecal occult blood test, a fecal DNA test, and sigmoidoscopy.  -All adults born between [facility] should be screened once for Hepatitis C.  -An Abdominal Aortic Aneurysm (AAA) Screening is recommended for men age 73-68 who has ever smoked in their lifetime.      Here is a list of your current Health Maintenance items (your personalized list of preventive services) with a due date:  Health Maintenance Due   Topic Date Due    Shingles Vaccine (1 of 2) Never done    Yearly Flu Vaccine (1) 09/01/2021

## 2021-09-02 NOTE — PROGRESS NOTES
SUBJECTIVE  Chief Complaint   Patient presents with    Follow Up Chronic Condition     several issues, back pain, anemia     Having intermittent back pains but has stopped lyrica. He has been advised by his spine specialist to follow-up with PCP since he is not a surgical candidate. Says he has occasional breakthrough pain and takes tylenol. Some relief with lidoderm patches. Also has been having morning time cramps. He has a history of HTN and used to be on norvasc. He has been normotensive off of that. The patient denies any chest pain or chest tightness. Denies any dyspnea upon exertion. He has a history of a bleeding esophageal ulcer. He says he still sees GI for follow-up but at his last visit he told me he did not. It is unclear. In the process he was diagnosed with a B12 deficiency and reports he is on vit B12 1000 daily along with iron. He has not had any melena or BRBPR. He is on PPI therapy. ROS:  History obtained from the patient   · Respiratory: no cough, shortness of breath, or wheezing. · Cardiovascular: no chest pain, palpitations, or dyspnea on exertion  · Gastrointestinal: no abdominal pain, N/V, change in bowel habits, or black or bloody stools  · Neurological: no numbness, tingling, headache or dizziness  · : no hematuria, dysuria, frequency, hesitancy, or nocturia. Diagnosed with right hydrocele. He is seeing urology. OBJECTIVE    Blood pressure 130/80, pulse 73, temperature 98.4 °F (36.9 °C), temperature source Temporal, resp. rate 16, height 5' 9\" (1.753 m), weight 167 lb (75.8 kg), SpO2 98 %. General:  Alert, cooperative, well appearing, in no apparent distress. CV:  The heart sounds are regular in rate and rhythm. There is a normal S1 and S2. There or no murmurs. Lungs: Inspiratory and expiratory efforts are full and unlabored. Lung sounds are clear and equal to auscultation throughout all lung fields without wheezing, rales, or rhonchi.   Ext: no swelling. No muscle cramps or tenderness. Neuro:  No deficits. Not using walker currently. Psych: normal affect. Mood good. Oriented x 3. Judgement and insight intact. Results for orders placed or performed in visit on 09/02/21   AMB POC HEMOGLOBIN A1C   Result Value Ref Range    Hemoglobin A1c (POC) 5.2 %     ASSESSMENT / PLAN    ICD-10-CM ICD-9-CM    1. Muscle cramps  R25.2 729.82    2. Essential hypertension  I10 401.9 CBC WITH AUTOMATED DIFF      METABOLIC PANEL, COMPREHENSIVE      LIPID PANEL   3. IFG (impaired fasting glucose)  R73.01 790.21 AMB POC HEMOGLOBIN A1C      HEMOGLOBIN A1C W/O EAG   4. Iron deficiency anemia, unspecified iron deficiency anemia type  D50.9 280.9 ferrous sulfate 325 mg (65 mg iron) tablet      FERRITIN   5. Vitamin B12 deficiency  E53.8 266.2 VITAMIN B12   6. Benign localized prostatic hyperplasia with lower urinary tract symptoms (LUTS)  N40.1 600.21      599.69    7. Spinal stenosis of lumbar region, unspecified whether neurogenic claudication present  M48.061 724.02    8. History of esophageal ulcer  Z87.19 V12.79      Muscle cramps - advised on proper hydration. HTN - controlled off meds. Cont to monitor. IFG - checked A1c in office to be normal.  Cont to monitor. B12 def -  Cont B12. Anemia secondary to history of esophageal ulcer, iron def, vit B12 def -  cont iron. Check ferritin and vit B12 next visit. Cont vit B12 lifelong. Cont PPI. Refills as needed. BPH with LUTS -  Cont urology follow-up. Defer med refills to them. Spinal stenosis - He is off Lyrica. I will follow his lead if he wishes to restart. As needed lidoderm and tylenol. All chart history elements were reviewed by me at the time of the visit even though marked at time of note closure. Patient understands our medical plan. Patient has provided input and agrees with goals. Alternatives have been explained and offered. All questions answered.   The patient is to call if condition worsens or fails to improve. Follow-up and Dispositions    · Return in about 6 months (around 3/2/2022) for routine care. Fasting labs due 3-7 days prior to appointment.

## 2021-09-02 NOTE — PROGRESS NOTES
Chief Complaint   Patient presents with   [facility] Annual Wellness Visit     1. Have you been to the ER, urgent care clinic since your last visit? Hospitalized since your last visit? No    2. Have you seen or consulted any other health care providers outside of the [address] [place] [address] since your last visit? Include any pap smears or colon screening. No    3 most recent PHQ Screens 9/2/2021   Little interest or pleasure in doing things Not at all   Feeling down, depressed, irritable, or hopeless Not at all   Total Score PHQ 2 0     Abuse Screening Questionnaire 9/2/2021   Do you ever feel afraid of your partner? N   Are you in a relationship with someone who physically or mentally threatens you? N   Is it safe for you to go home? Y     Fall Risk Assessment, last 12 mths 9/2/2021   Able to walk? Yes   Fall in past 12 months? 0   Do you feel unsteady? 0   Are you worried about falling 0   Number of falls in past 12 months -   Fall with injury?  -

## 2021-09-02 NOTE — PROGRESS NOTES
1. Have you been to the ER, urgent care clinic since your last visit? Hospitalized since your last visit? No    2. Have you seen or consulted any other health care providers outside of the [address] [place] [address] since your last visit? Include any pap smears or colon screening.  No

## 2021-09-02 NOTE — PROGRESS NOTES
Chief Complaint   Patient presents with    Annual Wellness Visit     This is a subsequent Medicare Annual Wellness Exam (AWV)     I have reviewed the patient's medical history in detail and updated the computerized patient record. History     Past Medical History:   Diagnosis Date    Arthritis     Chronic pain     left hip and lower back    Hypertension     Left hip pain     resolved since surgery    Thromboembolus ([address] 1975    brain, [facility] Wears glasses       Past Surgical History:   Procedure Laterality Date    FOOT/TOES SURGERY PROC UNLISTED      HX HIP REPLACEMENT Right     HX OTHER SURGICAL  1975    brain surgery - says he hit his head and then had headaches and had to have surgery,  Dr. Torri Humphries Left 6/2015     Current Outpatient Medications   Medication Sig Dispense Refill    ferrous sulfate 325 mg (65 mg iron) tablet Take 1 Tablet by mouth Daily (before breakfast). 90 Tablet 1    finasteride (PROSCAR) 5 mg tablet Take 1 Tablet by mouth daily. 90 Tablet 3    tamsulosin (FLOMAX) 0.4 mg capsule take 1 capsule by mouth once daily 90 Capsule 0    acetaminophen (TylenoL) 325 mg tablet Take  by mouth every four (4) hours as needed for Pain.  cyanocobalamin (Vitamin B-12) 1,000 mcg tablet Take 1 Tab by mouth daily. 90 Tab 3    aspirin 81 mg chewable tablet Take 81 mg by mouth daily.  lidocaine (LIDODERM) 5 % Apply patch to the affected area for 12 hours a day and remove for 12 hours a day. 30 Each 11    diclofenac (VOLTAREN) 1 % gel Apply  to affected area four (4) times daily. 100 g 4    pregabalin (LYRICA) 100 mg capsule Take 1 Cap by mouth two (2) times a day. Max Daily Amount: 200 mg.  Dx: spinal stenosis (Patient not taking: Reported on 9/2/2021) 180 Cap 1     No Known Allergies  Family History   Problem Relation Age of Onset    Cancer Mother     Heart Attack Daughter      Social History     Tobacco Use    Smoking status: Former Smoker Packs/day: 1.00     Years: 25.00     Pack years: 25.00     Quit date: 1990     Years since quittin.2    Smokeless tobacco: Never Used   Substance Use Topics    Alcohol use: No     Patient Active Problem List   Diagnosis Code    Osteoarthrosis, unspecified whether generalized or localized, pelvic region and thigh M16.9    HTN (hypertension) I10    Retention of urine R33.9    Left hip pain M25.552    Wears glasses Z97.3    Hip arthritis M16.10    Spinal stenosis of lumbar region M48.061    Synovial cyst, RT L4-5 M71.30    GI bleed K92.2    Anemia D64.9    Esophageal ulcer K22.10    Near syncope R55       Depression Risk Factor Screening:     3 most recent PHQ Screens 2021   Little interest or pleasure in doing things Not at all   Feeling down, depressed, irritable, or hopeless Not at all   Total Score PHQ 2 0     Alcohol Risk Factor Screening: You do not drink alcohol or very rarely. Functional Ability and Level of Safety:     Hearing Loss  Hearing is good. Activities of Daily Living  The home contains: use of rollator walker  Patient does total self care but does get help as needed from granddaughter he lives with. Fall Risk  Fall Risk Assessment, last 12 mths 2021   Able to walk? Yes   Fall in past 12 months? 0   Do you feel unsteady? 0   Are you worried about falling 0   Number of falls in past 12 months -   Fall with injury?  -       Abuse Screen  Patient is not abused    Cognitive Screening   Evaluation of Cognitive Function:  Has your family/caregiver stated any concerns about your memory: no    Patient Care Team   Patient Care Team:  Claude Jewel, MD as PCP - General (Family Medicine)  Claude Jewel, MD as PCP - [facility] Provider  Maty Camilo MD (Orthopedic Surgery)  Nicolasa Pro MD (Physical Medicine and Rehabilitation)  Marcie Walls MD (Gastroenterology)    Assessment/Plan   Education and counseling provided:  Are appropriate based on today's review and evaluation      ICD-10-CM ICD-9-CM    1. Medicare annual wellness visit, subsequent  Z00.00 V70.0      Age and sex specific counseling. Screening for depression and alcoholism. Advanced directives / end of life planning discussion. Care team updated. PPSV23 administered. Medicare recommended screening and prevention test table is filled out, reviewed, and provided to patient. Scanned to chart as well. Screening and prevention is up to date given his age. Patient understands our medical plan. Patient has provided input and agrees with goals. All questions answered. F/U yearly AWV.

## 2022-02-22 ENCOUNTER — HOSPITAL ENCOUNTER (OUTPATIENT)
Dept: LAB | Age: 87
Discharge: HOME | End: 2022-02-22

## 2022-02-22 LAB — SENTARA SPECIMEN COL,SENBCF: NORMAL

## 2022-02-22 PROCEDURE — 99001 SPECIMEN HANDLING PT-LAB: CPT

## 2022-02-23 LAB
A-G RATIO,AGRAT: 1.3 RATIO (ref 1.1–2.6)
ABSOLUTE LYMPHOCYTE COUNT, 10803: 1.3 K/UL (ref 1–4.8)
ALBUMIN SERPL-MCNC: 3.7 G/DL (ref 3.5–5)
ALP SERPL-CCNC: 77 U/L (ref 40–125)
ALT SERPL-CCNC: 16 U/L (ref 5–40)
ANION GAP SERPL CALC-SCNC: 12 MMOL/L (ref 3–15)
AST SERPL W P-5'-P-CCNC: 19 U/L (ref 10–37)
AVG GLU, 10930: 123 MG/DL (ref 91–123)
BASOPHILS # BLD: 0 K/UL (ref 0–0.2)
BASOPHILS NFR BLD: 1 % (ref 0–2)
BILIRUB SERPL-MCNC: 0.6 MG/DL (ref 0.2–1.2)
BUN SERPL-MCNC: 19 MG/DL (ref 6–22)
CALCIUM SERPL-MCNC: 8.8 MG/DL (ref 8.4–10.5)
CHLORIDE SERPL-SCNC: 104 MMOL/L (ref 98–110)
CHOLEST SERPL-MCNC: 155 MG/DL (ref 110–200)
CO2 SERPL-SCNC: 23 MMOL/L (ref 20–32)
CREAT SERPL-MCNC: 1 MG/DL (ref 0.8–1.6)
EOSINOPHIL # BLD: 0.2 K/UL (ref 0–0.5)
EOSINOPHIL NFR BLD: 3 % (ref 0–6)
ERYTHROCYTE [DISTWIDTH] IN BLOOD BY AUTOMATED COUNT: 12.7 % (ref 10–15.5)
FERRITIN SERPL-MCNC: 153 NG/ML (ref 22–322)
GFRAA, 66117: >60
GFRNA, 66118: >60
GLOBULIN,GLOB: 2.8 G/DL (ref 2–4)
GLUCOSE SERPL-MCNC: 69 MG/DL (ref 70–99)
GRANULOCYTES,GRANS: 67 % (ref 40–75)
HBA1C MFR BLD HPLC: 5.9 % (ref 4.8–5.6)
HCT VFR BLD AUTO: 38 % (ref 37.8–52.2)
HDLC SERPL-MCNC: 2.2 MG/DL (ref 0–5)
HDLC SERPL-MCNC: 69 MG/DL
HGB BLD-MCNC: 11.7 G/DL (ref 12.6–17.1)
LDL/HDL RATIO,LDHD: 1.1
LDLC SERPL CALC-MCNC: 76 MG/DL (ref 50–99)
LYMPHOCYTES, LYMLT: 18 % (ref 20–45)
MCH RBC QN AUTO: 31 PG (ref 26–34)
MCHC RBC AUTO-ENTMCNC: 31 G/DL (ref 31–36)
MCV RBC AUTO: 102 FL (ref 80–95)
MONOCYTES # BLD: 0.8 K/UL (ref 0.1–1)
MONOCYTES NFR BLD: 11 % (ref 3–12)
NEUTROPHILS # BLD AUTO: 4.7 K/UL (ref 1.8–7.7)
NON-HDL CHOLESTEROL, 011976: 86 MG/DL
PLATELET # BLD AUTO: 252 K/UL (ref 140–440)
PMV BLD AUTO: 11.8 FL (ref 9–13)
POTASSIUM SERPL-SCNC: 4.1 MMOL/L (ref 3.5–5.5)
PROT SERPL-MCNC: 6.5 G/DL (ref 6.2–8.1)
RBC # BLD AUTO: 3.73 M/UL (ref 3.8–5.8)
SODIUM SERPL-SCNC: 139 MMOL/L (ref 133–145)
TRIGL SERPL-MCNC: 47 MG/DL (ref 40–149)
VIT B12 SERPL-MCNC: 797 PG/ML (ref 211–911)
VLDLC SERPL CALC-MCNC: 9 MG/DL (ref 8–30)
WBC # BLD AUTO: 6.9 K/UL (ref 4–11)

## 2022-03-03 ENCOUNTER — OFFICE VISIT (OUTPATIENT)
Dept: FAMILY MEDICINE CLINIC | Age: 87
End: 2022-03-03
Payer: MEDICARE

## 2022-03-03 VITALS
DIASTOLIC BLOOD PRESSURE: 70 MMHG | SYSTOLIC BLOOD PRESSURE: 138 MMHG | HEIGHT: 69 IN | BODY MASS INDEX: 23.55 KG/M2 | HEART RATE: 69 BPM | WEIGHT: 159 LBS | RESPIRATION RATE: 14 BRPM | OXYGEN SATURATION: 99 % | TEMPERATURE: 98 F

## 2022-03-03 DIAGNOSIS — D50.9 IRON DEFICIENCY ANEMIA, UNSPECIFIED IRON DEFICIENCY ANEMIA TYPE: ICD-10-CM

## 2022-03-03 DIAGNOSIS — E53.8 VITAMIN B12 DEFICIENCY: ICD-10-CM

## 2022-03-03 DIAGNOSIS — M48.061 SPINAL STENOSIS OF LUMBAR REGION, UNSPECIFIED WHETHER NEUROGENIC CLAUDICATION PRESENT: ICD-10-CM

## 2022-03-03 DIAGNOSIS — Z87.19 HISTORY OF ESOPHAGEAL ULCER: ICD-10-CM

## 2022-03-03 DIAGNOSIS — N40.1 BENIGN LOCALIZED PROSTATIC HYPERPLASIA WITH LOWER URINARY TRACT SYMPTOMS (LUTS): ICD-10-CM

## 2022-03-03 DIAGNOSIS — I10 ESSENTIAL HYPERTENSION: Primary | ICD-10-CM

## 2022-03-03 DIAGNOSIS — R73.01 IFG (IMPAIRED FASTING GLUCOSE): ICD-10-CM

## 2022-03-03 PROCEDURE — G8510 SCR DEP NEG, NO PLAN REQD: HCPCS | Performed by: FAMILY MEDICINE

## 2022-03-03 PROCEDURE — G8536 NO DOC ELDER MAL SCRN: HCPCS | Performed by: FAMILY MEDICINE

## 2022-03-03 PROCEDURE — G8420 CALC BMI NORM PARAMETERS: HCPCS | Performed by: FAMILY MEDICINE

## 2022-03-03 PROCEDURE — 1101F PT FALLS ASSESS-DOCD LE1/YR: CPT | Performed by: FAMILY MEDICINE

## 2022-03-03 PROCEDURE — 99214 OFFICE O/P EST MOD 30 MIN: CPT | Performed by: FAMILY MEDICINE

## 2022-03-03 PROCEDURE — G8427 DOCREV CUR MEDS BY ELIG CLIN: HCPCS | Performed by: FAMILY MEDICINE

## 2022-03-03 RX ORDER — LANOLIN ALCOHOL/MO/W.PET/CERES
325 CREAM (GRAM) TOPICAL
Qty: 90 TABLET | Refills: 1 | Status: SHIPPED | OUTPATIENT
Start: 2022-03-03 | End: 2022-08-27

## 2022-03-03 RX ORDER — LANOLIN ALCOHOL/MO/W.PET/CERES
1000 CREAM (GRAM) TOPICAL DAILY
Qty: 90 TABLET | Refills: 3 | Status: SHIPPED | OUTPATIENT
Start: 2022-03-03

## 2022-03-03 RX ORDER — DICLOFENAC SODIUM 10 MG/G
GEL TOPICAL 4 TIMES DAILY
Qty: 100 G | Refills: 11 | Status: SHIPPED | OUTPATIENT
Start: 2022-03-03

## 2022-03-03 RX ORDER — PANTOPRAZOLE SODIUM 40 MG/1
TABLET, DELAYED RELEASE ORAL
Qty: 90 TABLET | Refills: 3 | Status: SHIPPED | OUTPATIENT
Start: 2022-03-03

## 2022-03-03 NOTE — PROGRESS NOTES
SUBJECTIVE  Chief Complaint   Patient presents with    Results    Other     IFG/prediabetes    Hypertension    Anemia     Overall doing well without complaints. Back doing well off of Lyrica. He has been using voltaren with some relief. He has been advised by his spine specialist to follow-up with PCP since he is not a surgical candidate. Says he takes tylenol for breakthrough pain. He used to get relief with lidoderm patches but he has stopped that. He has a history of HTN and used to be on norvasc. He has been normotensive off of that. His initial BP was elevated in the office. The patient denies any chest pain or chest tightness. Denies any dyspnea upon exertion. He has a history of a bleeding esophageal ulcer. He has no abdominal complaints but his Hg has declined gradually. In the past he was diagnosed with a B12 deficiency. He is taking iron and B12. He stopped seeing GI. They recommended a PPI indefinitely but he is not taking that for the past year he thinks. He has not had any melena or BRBPR.     ROS:  History obtained from the patient   · Respiratory: no cough, shortness of breath, or wheezing. · Cardiovascular: no chest pain, palpitations, or dyspnea on exertion  · Gastrointestinal: no abdominal pain, N/V, change in bowel habits, or black or bloody stools  · Neurological: no numbness, tingling, headache or dizziness  · : no hematuria, dysuria, frequency, hesitancy, or nocturia. Diagnosed with right hydrocele and BPH. He is seeing urology. OBJECTIVE    Blood pressure 138/70, pulse 69, temperature 98 °F (36.7 °C), temperature source Temporal, resp. rate 14, height 5' 9\" (1.753 m), weight 159 lb (72.1 kg), SpO2 99 %. General:  Alert, cooperative, well appearing, in no apparent distress. CV:  The heart sounds are regular in rate and rhythm. There is a normal S1 and S2. There or no murmurs. Lungs: Inspiratory and expiratory efforts are full and unlabored.   Lung sounds are clear and equal to auscultation throughout all lung fields without wheezing, rales, or rhonchi. Abd: soft, nontender. Ext: no swelling. No muscle cramps or tenderness. Neuro:  No deficits. Psych: normal affect. Mood good. Oriented x 3. Judgement and insight intact. Results for orders placed or performed in visit on 02/22/22   LIPID PANEL   Result Value Ref Range    Triglyceride 47 40 - 149 mg/dL    HDL Cholesterol 69 >=40 mg/dL    Cholesterol, total 155 110 - 200 mg/dL    CHOLESTEROL/HDL 2.2 0.0 - 5.0    Non-HDL Cholesterol 86 <130 mg/dL    LDL, calculated 76 50 - 99 mg/dL    VLDL, calculated 9 8 - 30 mg/dL    LDL/HDL Ratio 1.1    METABOLIC PANEL, COMPREHENSIVE   Result Value Ref Range    Glucose 69 (L) 70 - 99 mg/dL    BUN 19 6 - 22 mg/dL    Creatinine 1.0 0.8 - 1.6 mg/dL    Sodium 139 133 - 145 mmol/L    Potassium 4.1 3.5 - 5.5 mmol/L    Chloride 104 98 - 110 mmol/L    CO2 23 20 - 32 mmol/L    AST (SGOT) 19 10 - 37 U/L    ALT (SGPT) 16 5 - 40 U/L    Alk. phosphatase 77 40 - 125 U/L    Bilirubin, total 0.6 0.2 - 1.2 mg/dL    Calcium 8.8 8.4 - 10.5 mg/dL    Protein, total 6.5 6.2 - 8.1 g/dL    Albumin 3.7 3.5 - 5.0 g/dL    A-G Ratio 1.3 1.1 - 2.6 ratio    Globulin 2.8 2.0 - 4.0 g/dL    Anion gap 12.0 3.0 - 15.0 mmol/L    GFRAA >60.0 >60.0    GFRNA >60.0 >60.0   FERRITIN   Result Value Ref Range    Ferritin 153 22 - 322 ng/mL   VITAMIN B12   Result Value Ref Range    Vitamin B12 797 211 - 911 pg/mL   CBC WITH AUTOMATED DIFF   Result Value Ref Range    WBC 6.9 4.0 - 11.0 K/uL    RBC 3.73 (L) 3.80 - 5.80 M/uL    HGB 11.7 (L) 12.6 - 17.1 g/dL    HCT 38.0 37.8 - 52.2 %     (H) 80 - 95 fL    MCH 31 26 - 34 pg    MCHC 31 31 - 36 g/dL    RDW 12.7 10.0 - 15.5 %    PLATELET 892 323 - 454 K/uL    MPV 11.8 9.0 - 13.0 fL    NEUTROPHILS 67 40 - 75 %    Lymphocytes 18 (L) 20 - 45 %    MONOCYTES 11 3 - 12 %    EOSINOPHILS 3 0 - 6 %    BASOPHILS 1 0 - 2 %    ABS.  NEUTROPHILS 4.7 1.8 - 7.7 K/uL ABSOLUTE LYMPHOCYTE COUNT 1.3 1.0 - 4.8 K/uL    ABS. MONOCYTES 0.8 0.1 - 1.0 K/uL    ABS. EOSINOPHILS 0.2 0.0 - 0.5 K/uL    ABS. BASOPHILS 0.0 0.0 - 0.2 K/uL   HEMOGLOBIN A1C W/O EAG   Result Value Ref Range    Hemoglobin A1c 5.9 (H) 4.8 - 5.6 %    AVG  91 - 123 mg/dL     ASSESSMENT / PLAN    ICD-10-CM ICD-9-CM    1. Essential hypertension  I10 401.9    2. IFG (impaired fasting glucose)  R73.01 790.21    3. Vitamin B12 deficiency  E53.8 266.2    4. Iron deficiency anemia, unspecified iron deficiency anemia type  D50.9 280.9 ferrous sulfate 325 mg (65 mg iron) tablet   5. Benign localized prostatic hyperplasia with lower urinary tract symptoms (LUTS)  N40.1 600.21      599.69    6. Spinal stenosis of lumbar region, unspecified whether neurogenic claudication present  M48.061 724.02 diclofenac (VOLTAREN) 1 % gel   7. History of esophageal ulcer  Z87.19 V12.79 pantoprazole (PROTONIX) 40 mg tablet     HTN - controlled off meds. Cont to monitor. IFG - minimal elevation of A1c. This has increased some. Cont to monitor. B12 def -  Cont B12. Current levels normal.      Anemia secondary to history of esophageal ulcer, iron def, vit B12 def -  cont iron. Ferritin levels normal.  Cont vit B12 lifelong. Cont PPI indefinitely per GI. He has not followed with GI but if his Hg continues to decline, he should consider going back. .   Refills as needed. BPH with LUTS -  Cont urology follow-up. Defer med refills to them. Spinal stenosis - He is off Lyrica. I will follow his lead if he wishes to restart. As needed voltaren and tylenol. In the past he had benefit from lidoderm but he is not currently using that. All chart history elements were reviewed by me at the time of the visit even though marked at time of note closure. Patient understands our medical plan. Patient has provided input and agrees with goals. Alternatives have been explained and offered. All questions answered.   The patient is to call if condition worsens or fails to improve. Follow-up and Dispositions    · Return in about 6 months (around 9/3/2022) for routine care. A1c and Hg to be done office.

## 2022-03-03 NOTE — PROGRESS NOTES
1. \"Have you been to the ER, urgent care clinic since your last visit? Hospitalized since your last visit? \" No    2. \"Have you seen or consulted any other health care providers outside of the [address] [place] [address] since your last visit? \" No     3. For patients aged 39-70: Has the patient had a colonoscopy / FIT/ Cologuard? NA - based on age      If the patient is female:    4. For patients aged 41-77: Has the patient had a mammogram within the past 2 years? NA - based on age or sex      11. For patients aged 21-65: Has the patient had a pap smear?  NA - based on age or sex

## 2022-03-03 NOTE — PATIENT INSTRUCTIONS
A Healthy Lifestyle: Care Instructions  Your Care Instructions     A healthy lifestyle can help you feel good, stay at a healthy weight, and have plenty of energy for both work and play. A healthy lifestyle is something you can share with your whole family. A healthy lifestyle also can lower your risk for serious health problems, such as high blood pressure, heart disease, and diabetes. You can follow a few steps listed below to improve your health and the health of your family. Follow-up care is a key part of your treatment and safety. Be sure to make and go to all appointments, and call your doctor if you are having problems. It's also a good idea to know your test results and keep a list of the medicines you take. How can you care for yourself at home? · Do not eat too much sugar, fat, or fast foods. You can still have dessert and treats now and then. The goal is moderation. · Start small to improve your eating habits. Pay attention to portion sizes, drink less juice and soda pop, and eat more fruits and vegetables. ? Eat a healthy amount of food. A 3-ounce serving of meat, for example, is about the size of a deck of cards. Fill the rest of your plate with vegetables and whole grains. ? Limit the amount of soda and sports drinks you have every day. Drink more water when you are thirsty. ? Eat plenty of fruits and vegetables every day. Have an apple or some carrot sticks as an afternoon snack instead of a candy bar. Try to have fruits and/or vegetables at every meal.  · Make exercise part of your daily routine. You may want to start with simple activities, such as walking, bicycling, or slow swimming. Try to be active 30 to 60 minutes every day. You do not need to do all 30 to 60 minutes all at once. For example, you can exercise 3 times a day for 10 or 20 minutes.  Moderate exercise is safe for most people, but it is always a good idea to talk to your doctor before starting an exercise program.  · Keep moving. Juan David Gutierrez the lawn, work in the garden, or [employer]. Take the stairs instead of the elevator at work. · If you smoke, quit. People who smoke have an increased risk for heart attack, stroke, cancer, and other lung illnesses. Quitting is hard, but there are ways to boost your chance of quitting tobacco for good. ? Use nicotine gum, patches, or lozenges. ? Ask your doctor about stop-smoking programs and medicines. ? Keep trying. In addition to reducing your risk of diseases in the future, you will notice some benefits soon after you stop using tobacco. If you have shortness of breath or asthma symptoms, they will likely get better within a few weeks after you quit. · Limit how much alcohol you drink. Moderate amounts of alcohol (up to 2 drinks a day for men, 1 drink a day for women) are okay. But drinking too much can lead to liver problems, high blood pressure, and other health problems. Family health  If you have a family, there are many things you can do together to improve your health. · Eat meals together as a family as often as possible. · Eat healthy foods. This includes fruits, vegetables, lean meats and dairy, and whole grains. · Include your family in your fitness plan. Most people think of activities such as jogging or tennis as the way to fitness, but there are many ways you and your family can be more active. Anything that makes you breathe hard and gets your heart pumping is exercise. Here are some tips:  ? Walk to do errands or to take your child to school or the bus.  ? Go for a family bike ride after dinner instead of watching TV. Where can you learn more? Go to http://www.gray.com/  Enter H907 in the search box to learn more about \"A Healthy Lifestyle: Care Instructions. \"  Current as of: June 16, 2021               Content Version: 13.0  © 6243-3956 Healthwise, Incorporated.    Care instructions adapted under license by [employer] (which disclaims liability or warranty for this information). If you have questions about a medical condition or this instruction, always ask your healthcare professional. [address] any warranty or liability for your use of this information.

## 2022-03-18 PROBLEM — K22.10 ESOPHAGEAL ULCER: Status: ACTIVE | Noted: 2018-02-17

## 2022-03-18 PROBLEM — R55 NEAR SYNCOPE: Status: ACTIVE | Noted: 2017-08-11

## 2022-03-19 PROBLEM — M71.30 SYNOVIAL CYST: Status: ACTIVE | Noted: 2017-10-12

## 2022-03-19 PROBLEM — D64.9 ANEMIA: Status: ACTIVE | Noted: 2018-02-15

## 2022-03-19 PROBLEM — K92.2 GI BLEED: Status: ACTIVE | Noted: 2018-02-15

## 2022-08-27 DIAGNOSIS — D50.9 IRON DEFICIENCY ANEMIA, UNSPECIFIED IRON DEFICIENCY ANEMIA TYPE: ICD-10-CM

## 2022-08-27 RX ORDER — LANOLIN ALCOHOL/MO/W.PET/CERES
CREAM (GRAM) TOPICAL
Qty: 90 TABLET | Refills: 1 | Status: SHIPPED | OUTPATIENT
Start: 2022-08-27

## 2022-09-28 NOTE — PROGRESS NOTES
1. \"Have you been to the ER, urgent care clinic since your last visit? Hospitalized since your last visit? \" No    2. \"Have you seen or consulted any other health care providers outside of the [address] [place] [address] since your last visit? \" No     3. For patients aged 39-70: Has the patient had a colonoscopy / FIT/ Cologuard? NA - based on age      If the patient is female:    4. For patients aged 41-77: Has the patient had a mammogram within the past 2 years? NA - based on age or sex      11. For patients aged 21-65: Has the patient had a pap smear? NA - based on age or sex    Flu vaccine administered by employer. Patient unsure of date.

## 2022-09-29 ENCOUNTER — OFFICE VISIT (OUTPATIENT)
Dept: FAMILY MEDICINE CLINIC | Age: 87
End: 2022-09-29
Payer: MEDICARE

## 2022-09-29 ENCOUNTER — OFFICE VISIT (OUTPATIENT)
Dept: FAMILY MEDICINE CLINIC | Age: 87
End: 2022-09-29

## 2022-09-29 VITALS
TEMPERATURE: 98.4 F | HEIGHT: 69 IN | WEIGHT: 159 LBS | HEART RATE: 70 BPM | SYSTOLIC BLOOD PRESSURE: 128 MMHG | BODY MASS INDEX: 23.55 KG/M2 | OXYGEN SATURATION: 98 % | DIASTOLIC BLOOD PRESSURE: 86 MMHG | RESPIRATION RATE: 14 BRPM

## 2022-09-29 VITALS
RESPIRATION RATE: 14 BRPM | SYSTOLIC BLOOD PRESSURE: 128 MMHG | TEMPERATURE: 98.4 F | OXYGEN SATURATION: 98 % | DIASTOLIC BLOOD PRESSURE: 86 MMHG | HEIGHT: 69 IN | WEIGHT: 159 LBS | BODY MASS INDEX: 23.55 KG/M2 | HEART RATE: 70 BPM

## 2022-09-29 DIAGNOSIS — E53.8 VITAMIN B12 DEFICIENCY: ICD-10-CM

## 2022-09-29 DIAGNOSIS — I10 ESSENTIAL HYPERTENSION: ICD-10-CM

## 2022-09-29 DIAGNOSIS — M48.061 SPINAL STENOSIS OF LUMBAR REGION, UNSPECIFIED WHETHER NEUROGENIC CLAUDICATION PRESENT: ICD-10-CM

## 2022-09-29 DIAGNOSIS — M20.11 HALLUX VALGUS OF RIGHT FOOT: Primary | ICD-10-CM

## 2022-09-29 DIAGNOSIS — Z71.89 ADVANCED DIRECTIVES, COUNSELING/DISCUSSION: ICD-10-CM

## 2022-09-29 DIAGNOSIS — N40.1 BENIGN LOCALIZED PROSTATIC HYPERPLASIA WITH LOWER URINARY TRACT SYMPTOMS (LUTS): ICD-10-CM

## 2022-09-29 DIAGNOSIS — R73.01 IFG (IMPAIRED FASTING GLUCOSE): ICD-10-CM

## 2022-09-29 DIAGNOSIS — Z00.00 MEDICARE ANNUAL WELLNESS VISIT, SUBSEQUENT: Primary | ICD-10-CM

## 2022-09-29 DIAGNOSIS — D50.9 IRON DEFICIENCY ANEMIA, UNSPECIFIED IRON DEFICIENCY ANEMIA TYPE: ICD-10-CM

## 2022-09-29 LAB — HBA1C MFR BLD HPLC: 5.3 %

## 2022-09-29 PROCEDURE — 1101F PT FALLS ASSESS-DOCD LE1/YR: CPT | Performed by: FAMILY MEDICINE

## 2022-09-29 PROCEDURE — 1123F ACP DISCUSS/DSCN MKR DOCD: CPT | Performed by: FAMILY MEDICINE

## 2022-09-29 PROCEDURE — 99214 OFFICE O/P EST MOD 30 MIN: CPT | Performed by: FAMILY MEDICINE

## 2022-09-29 PROCEDURE — G8510 SCR DEP NEG, NO PLAN REQD: HCPCS | Performed by: FAMILY MEDICINE

## 2022-09-29 PROCEDURE — G8427 DOCREV CUR MEDS BY ELIG CLIN: HCPCS | Performed by: FAMILY MEDICINE

## 2022-09-29 PROCEDURE — 99497 ADVNCD CARE PLAN 30 MIN: CPT | Performed by: FAMILY MEDICINE

## 2022-09-29 PROCEDURE — G0439 PPPS, SUBSEQ VISIT: HCPCS | Performed by: FAMILY MEDICINE

## 2022-09-29 PROCEDURE — G8536 NO DOC ELDER MAL SCRN: HCPCS | Performed by: FAMILY MEDICINE

## 2022-09-29 PROCEDURE — 83036 HEMOGLOBIN GLYCOSYLATED A1C: CPT | Performed by: FAMILY MEDICINE

## 2022-09-29 PROCEDURE — G8420 CALC BMI NORM PARAMETERS: HCPCS | Performed by: FAMILY MEDICINE

## 2022-09-29 NOTE — PATIENT INSTRUCTIONS
A Healthy Lifestyle: Care Instructions  Your Care Instructions     A healthy lifestyle can help you feel good, stay at a healthy weight, and have plenty of energy for both work and play. A healthy lifestyle is something you can share with your whole family. A healthy lifestyle also can lower your risk for serious health problems, such as high blood pressure, heart disease, and diabetes. You can follow a few steps listed below to improve your health and the health of your family. Follow-up care is a key part of your treatment and safety. Be sure to make and go to all appointments, and call your doctor if you are having problems. It's also a good idea to know your test results and keep a list of the medicines you take. How can you care for yourself at home? Do not eat too much sugar, fat, or fast foods. You can still have dessert and treats now and then. The goal is moderation. Start small to improve your eating habits. Pay attention to portion sizes, drink less juice and soda pop, and eat more fruits and vegetables. Eat a healthy amount of food. A 3-ounce serving of meat, for example, is about the size of a deck of cards. Fill the rest of your plate with vegetables and whole grains. Limit the amount of soda and sports drinks you have every day. Drink more water when you are thirsty. Eat plenty of fruits and vegetables every day. Have an apple or some carrot sticks as an afternoon snack instead of a candy bar. Try to have fruits and/or vegetables at every meal.  Make exercise part of your daily routine. You may want to start with simple activities, such as walking, bicycling, or slow swimming. Try to be active 30 to 60 minutes every day. You do not need to do all 30 to 60 minutes all at once. For example, you can exercise 3 times a day for 10 or 20 minutes. Moderate exercise is safe for most people, but it is always a good idea to talk to your doctor before starting an exercise program.  Keep moving.  Nahun Garcia the lawn, work in the garden, or clean your house. Take the stairs instead of the elevator at work. If you smoke, quit. People who smoke have an increased risk for heart attack, stroke, cancer, and other lung illnesses. Quitting is hard, but there are ways to boost your chance of quitting tobacco for good. Use nicotine gum, patches, or lozenges. Ask your doctor about stop-smoking programs and medicines. Keep trying. In addition to reducing your risk of diseases in the future, you will notice some benefits soon after you stop using tobacco. If you have shortness of breath or asthma symptoms, they will likely get better within a few weeks after you quit. Limit how much alcohol you drink. Moderate amounts of alcohol (up to 2 drinks a day for men, 1 drink a day for women) are okay. But drinking too much can lead to liver problems, high blood pressure, and other health problems. Family health  If you have a family, there are many things you can do together to improve your health. Eat meals together as a family as often as possible. Eat healthy foods. This includes fruits, vegetables, lean meats and dairy, and whole grains. Include your family in your fitness plan. Most people think of activities such as jogging or tennis as the way to fitness, but there are many ways you and your family can be more active. Anything that makes you breathe hard and gets your heart pumping is exercise. Here are some tips:  Walk to do errands or to take your child to school or the bus. Go for a family bike ride after dinner instead of watching TV. Where can you learn more? Go to http://em-gus.info/  Enter O791 in the search box to learn more about \"A Healthy Lifestyle: Care Instructions. \"  Current as of: June 16, 2021               Content Version: 13.2  © 8399-3648 Healthwise, Incorporated.    Care instructions adapted under license by [employer] (which disclaims liability or warranty for this information). If you have questions about a medical condition or this instruction, always ask your healthcare professional. [place] [address] any warranty or liability for your use of this information.

## 2022-09-29 NOTE — PROGRESS NOTES
Chief Complaint   Patient presents with    Annual Wellness Visit       This is a subsequent Medicare Annual Wellness Exam (AWV)     I have reviewed the patient's medical history in detail and updated the computerized patient record. 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 Madina Echevarria Left 6/2015     Current Outpatient Medications   Medication Sig Dispense Refill    finasteride (PROSCAR) 5 mg tablet take 1 tablet by mouth once daily 90 Tablet 2    tamsulosin (FLOMAX) 0.4 mg capsule take 1 capsule by mouth once daily 90 Capsule 2    Lumigan 0.01 % ophthalmic drops instill 1 drop into both eyes at bedtime (Patient not taking: Reported on 9/29/2022)      ferrous sulfate 325 mg (65 mg iron) tablet take 1 tablet by mouth daily before breakfast 90 Tablet 1    amLODIPine (NORVASC) 10 mg tablet 1 tab(s)      pregabalin (LYRICA) 75 mg capsule 1 cap(s) (Patient not taking: Reported on 9/29/2022)      cyanocobalamin (Vitamin B-12) 1,000 mcg tablet Take 1 Tablet by mouth daily. 90 Tablet 3    diclofenac (VOLTAREN) 1 % gel Apply  to affected area four (4) times daily. 100 g 11    pantoprazole (PROTONIX) 40 mg tablet TAKE 1 TABLET EVERY DAY 90 Tablet 3    acetaminophen (TYLENOL) 325 mg tablet Take  by mouth every four (4) hours as needed for Pain. aspirin 81 mg chewable tablet Take 81 mg by mouth daily.        No Known Allergies  Family History   Problem Relation Age of Onset    Cancer Mother     Heart Attack Daughter      Social History     Tobacco Use    Smoking status: Former     Packs/day: 1.00     Years: 25.00     Pack years: 25.00     Types: Cigarettes     Quit date: 1990     Years since quittin.3    Smokeless tobacco: Never   Substance Use Topics    Alcohol use: Yes     Comment: occ     Patient Active Problem List   Diagnosis Code    Osteoarthrosis, unspecified whether generalized or localized, pelvic region and thigh M16.10    HTN (hypertension) I10    Retention of urine R33.9    Left hip pain M25.552    Wears glasses Z97.3    Hip arthritis M16.10    Spinal stenosis of lumbar region M48.061    Synovial cyst, RT L4-5 M71.30    GI bleed K92.2    Anemia D64.9    Esophageal ulcer K22.10    Near syncope R55       Depression Risk Factor Screening:     3 most recent PHQ Screens 2022   Little interest or pleasure in doing things Not at all   Feeling down, depressed, irritable, or hopeless Not at all   Total Score PHQ 2 0     Alcohol Risk Factor Screening: You do not drink alcohol or very rarely. Functional Ability and Level of Safety:     Hearing Loss  Hearing is rated as \"fair. \"    Activities of Daily Living  The home contains:  use of rollator walker  sometimes if he has to walk far distances. Patient does total self care but does get help as needed from granddaughter he lives with. Still drives and works full time at [employer]. Fall Risk  Fall Risk Assessment, last 12 mths 2022   Able to walk? Yes   Fall in past 12 months? 0   Do you feel unsteady? 1   Are you worried about falling 0   Is TUG test greater than 12 seconds? -   Is the gait abnormal? 1   Number of falls in past 12 months 0   Fall with injury?  -       Abuse Screen  Patient is not abused    Cognitive Screening   Evaluation of Cognitive Function:  Has your family/caregiver stated any concerns about your memory: no    Patient Care Team   Patient Care Team:  Kathy Padilla MD as PCP - General (Family Medicine)  Kathy Padilla MD as PCP - [facility] Empaneled Provider  Evelia Jeong MD (Orthopedic Surgery)  Arya Parekh MD (Physical Medicine and Rehabilitation Physician)  Loco Farmer MD (Gastroenterology)    Assessment/Plan   Education and counseling provided:  Are appropriate based on today's review and evaluation      ICD-10-CM ICD-9-CM    1. Medicare annual wellness visit, subsequent  Z00.00 V70.0       2. Advanced directives, counseling/discussion  Z71.89 V65.49 ADVANCE CARE PLANNING FIRST 27 MINS        Age and sex specific counseling. Screening for depression and alcoholism. Advanced directives / end of life planning discussion. Care team updated. Advised on shingles vaccine. Medicare recommended screening and prevention test table is filled out, reviewed, and provided to patient. Patient understands our medical plan. Patient has provided input and agrees with goals. All questions answered. F/U yearly AWV.

## 2022-09-29 NOTE — ACP (ADVANCE CARE PLANNING)
Advance Care Planning       Advance Care Planning (ACP) Physician/NP/PA (Provider) Jessi Garg        Date of ACP Conversation: 9/29/2022    Conversation Conducted with:   Patient with [address] Maker:    Current Designated Health Care Decision Maker:   Primary Decision Maker: Ferdinand Birch - Other Relative - [phone]    Secondary Decision Maker: Elio Soriano - Other Relative - [phone]    Care Preferences:    Hospitalization: \"If your health worsens and it becomes clear that your chance of recovery is unlikely, what would your preference be regarding hospitalization? \"    \"Unsure\"      Ventilation: \"If you were in your present state of health and suddenly became very ill and were unable to breathe on your own, what would your preference be about the use of a ventilator (breathing machine) if it was available to you? \"      \"Yes, I would want\"    \"If your health worsens and it becomes clear that your chance of recovery is unlikely, what would your preference be about the use of a ventilator (breathing machine) if it was available to you? \"     \"I do not think that would be a good idea\"      Resuscitation:  \"CPR works best to restart the heart when there is a sudden event, like a heart attack, in someone who is otherwise healthy. Unfortunately, CPR does not typically restart the heart for people who have serious health conditions or who are very sick. \"    \"In the event your heart stopped as a result of an underlying serious health condition, would you want attempts to be made to restart your heart (answer \"yes\" for attempt to resuscitate) or would you prefer a natural death (answer \"no\" for do not attempt to resuscitate)? \"     \"yes If I would recover\"    NOTE: If the patient has a valid advance directive AND provides care preference(s) that are inconsistent with that prior directive, advise the patient to consider either: creating a new advance directive that complies with state-specific requirements; or, if that is not possible, orally revoking that prior directive in accordance with state-specific requirements, which must be documented in the EHR. Conversation Outcomes / Follow-Up Plan:   Recommended completion of Advance Directive  Refer to ACP specialist to further talks per his acceptance.        Length of Voluntary ACP Conversation in minutes:  16 minutes      Hitesh Buck MD

## 2022-09-29 NOTE — PROGRESS NOTES
Chief Complaint   Patient presents with    Annual Wellness Visit     3 most recent [facility] Screens 9/29/2022   Little interest or pleasure in doing things Not at all   Feeling down, depressed, irritable, or hopeless Not at all   Total Score PHQ 2 0     Abuse Screening Questionnaire 3/3/2022   Do you ever feel afraid of your partner? N   Are you in a relationship with someone who physically or mentally threatens you? N   Is it safe for you to go home? Y     Fall Risk Assessment, last 12 mths 9/29/2022   Able to walk? Yes   Fall in past 12 months? 0   Do you feel unsteady? 1   Are you worried about falling 0   Is TUG test greater than 12 seconds? -   Is the gait abnormal? 1   Number of falls in past 12 months 0   Fall with injury? -     1. \"Have you been to the ER, urgent care clinic since your last visit? Hospitalized since your last visit? \" No    2. \"Have you seen or consulted any other health care providers outside of the [address] [place] [address] since your last visit? \" No     3. For patients aged 39-70: Has the patient had a colonoscopy / FIT/ Cologuard? NA - based on age      If the patient is female:    4. For patients aged 41-77: Has the patient had a mammogram within the past 2 years? NA - based on age or sex      11. For patients aged 21-65: Has the patient had a pap smear?  NA - based on age or sex

## 2022-09-29 NOTE — PROGRESS NOTES
SUBJECTIVE  Chief Complaint   Patient presents with    Anemia    Vitamin B12 Deficiency    Hypertension    Benign Prostatic Hypertrophy     Overall doing well but has a new complaint. For the past several months he has had right 2nd toe pain. Used to have pain in his bunion but that resolved. He does want to get this addressed as it causes daily pain. Back doing well off of Lyrica. He has been using voltaren with some relief. He has been advised by his spine specialist to follow-up with PCP since he is not a surgical candidate. Says he takes tylenol for breakthrough pain. He used to get relief with lidoderm patches but he has stopped that. He has a history of HTN. Unsure if he is on Norvasc. He does not bring meds for med rec and cannot recall his meds. The patient denies any chest pain or chest tightness. Denies any dyspnea upon exertion. He has a history of a bleeding esophageal ulcer. He has no abdominal complaints but his Hg has declined gradually. In the past he was diagnosed with a B12 deficiency. He thinks he is taking iron and B12. He stopped seeing GI. They recommended a PPI indefinitely which he says he is on. He has not had any melena or BRBPR.     ROS:  History obtained from the patient   Respiratory: no cough, shortness of breath, or wheezing. Cardiovascular: no chest pain, palpitations, or dyspnea on exertion  Gastrointestinal: no abdominal pain, N/V, change in bowel habits, or black or bloody stools  Neurological: no numbness, tingling, headache or dizziness  : no hematuria, dysuria, frequency, hesitancy, or nocturia. Diagnosed with right hydrocele and BPH. He is seeing urology. OBJECTIVE    Blood pressure 128/86, pulse 70, temperature 98.4 °F (36.9 °C), temperature source Temporal, resp. rate 14, height 5' 9\" (1.753 m), weight 159 lb (72.1 kg), SpO2 98 %. General:  Alert, cooperative, well appearing, in no apparent distress.   CV:  The heart sounds are regular in rate and rhythm. There is a normal S1 and S2. There or no murmurs. Lungs: Inspiratory and expiratory efforts are full and unlabored. Lung sounds are clear and equal to auscultation throughout all lung fields without wheezing, rales, or rhonchi. Abd: soft, nontender. Ext: no swelling. Neuro:  No deficits. Psych: normal affect. Mood good. Oriented x 3. Judgement and insight intact. Results for orders placed or performed in visit on 09/29/22   AMB POC HEMOGLOBIN A1C   Result Value Ref Range    Hemoglobin A1c (POC) 5.3 %     ASSESSMENT / PLAN    ICD-10-CM ICD-9-CM    1. Hallux valgus of right foot  M20.11 735.0 REFERRAL TO ORTHOPEDICS      2. Essential hypertension  I10 401.9 CBC WITH AUTOMATED DIFF      METABOLIC PANEL, COMPREHENSIVE      3. Iron deficiency anemia, unspecified iron deficiency anemia type  D50.9 280.9       4. IFG (impaired fasting glucose)  R73.01 790.21 AMB POC HEMOGLOBIN A1C      HEMOGLOBIN A1C W/O EAG      5. Vitamin B12 deficiency  E53.8 266.2 VITAMIN B12      6. Benign localized prostatic hyperplasia with lower urinary tract symptoms (LUTS)  N40.1 600.21      599.69       7. Spinal stenosis of lumbar region, unspecified whether neurogenic claudication present  M48.061 724.02         Hallux valgus - refer to ortho foot and ankle. HTN - controlled. Cont to monitor. Med list currently includes norvasc but it is unclear if he is actually on it. Asked to bring meds to each visit from now on. Anemia secondary to history of esophageal ulcer, iron def, vit B12 def -  cont iron. Cont vit B12 lifelong. Cont PPI indefinitely per GI. Refills as needed. Check levels next set of labs. IFG - minimal elevation of A1c in the past now normal.  Cont to monitor. BPH with LUTS - cont per urology. Spinal stenosis - He is off Lyrica. I will follow his lead if he wishes to restart. As needed voltaren and tylenol.  In the past he had benefit from lidoderm but he is not currently using that. All chart history elements were reviewed by me at the time of the visit even though marked at time of note closure. Patient understands our medical plan. Patient has provided input and agrees with goals. Alternatives have been explained and offered. All questions answered. The patient is to call if condition worsens or fails to improve. Follow-up and Dispositions    Return in about 6 months (around 3/29/2023) for routine care. Non-fasting labs due 3-7 days prior to appointment.

## 2022-09-29 NOTE — PATIENT INSTRUCTIONS
Medicare Wellness Visit, Male    The best way to live healthy is to have a lifestyle where you eat a well-balanced diet, exercise regularly, limit alcohol use, and quit all forms of tobacco/nicotine, if applicable. Regular preventive services are another way to keep healthy. Preventive services (vaccines, screening tests, monitoring & exams) can help personalize your care plan, which helps you manage your own care. Screening tests can find health problems at the earliest stages, when they are easiest to treat. [facility] follows the current, evidence-based guidelines published by the [facility] ([facility] [[email or web address]STF) when recommending preventive services for our patients. Because we follow these guidelines, sometimes recommendations change over time as research supports it. (For example, a prostate screening blood test is no longer routinely recommended for men with no symptoms). Of course, you and your doctor may decide to screen more often for some diseases, based on your risk and co-morbidities (chronic disease you are already diagnosed with). Preventive services for you include:  - Medicare offers their members a free annual wellness visit, which is time for you and your primary care provider to discuss and plan for your preventive service needs. Take advantage of this benefit every year!  -All adults over age 72 should receive the recommended pneumonia vaccines. Current USPSTF guidelines recommend a series of two vaccines for the best pneumonia protection.   -All adults should have a flu vaccine yearly and tetanus vaccine every 10 years.  -All adults age 48 and older should receive the shingles vaccines (series of two vaccines).        -All adults age 38-68 who are overweight should have a diabetes screening test once every three years.   -Other screening tests & preventive services for persons with diabetes include: an eye exam to screen for diabetic retinopathy, a kidney function test, a foot exam, and stricter control over your cholesterol.   -Cardiovascular screening for adults with routine risk involves an electrocardiogram (ECG) at intervals determined by the provider.   -Colorectal cancer screening should be done for adults age 54-65 with no increased risk factors for colorectal cancer. There are a number of acceptable methods of screening for this type of cancer. Each test has its own benefits and drawbacks. Discuss with your provider what is most appropriate for you during your annual wellness visit. The different tests include: colonoscopy (considered the best screening method), a fecal occult blood test, a fecal DNA test, and sigmoidoscopy.  -All adults born between [facility] should be screened once for Hepatitis C.  -An Abdominal Aortic Aneurysm (AAA) Screening is recommended for men age 73-68 who has ever smoked in their lifetime.      Here is a list of your current Health Maintenance items (your personalized list of preventive services) with a due date:  Health Maintenance Due   Topic Date Due    Shingles Vaccine (1 of 2) Never done    COVID-19 Vaccine (4 - Booster for Familia Files series) 03/24/2022    Yearly Flu Vaccine (1) 08/01/2022    Annual Well Visit  09/03/2022

## 2022-09-30 ENCOUNTER — PATIENT OUTREACH (OUTPATIENT)
Dept: CASE MANAGEMENT | Age: 87
End: 2022-09-30

## 2022-09-30 NOTE — ACP (ADVANCE CARE PLANNING)
Advance Care Planning   Ambulatory ACP Specialist Patient Outreach    Date:  9/30/2022    ACP Specialist:  Hernesto Zimmerman LPN    Outreach call to patient in follow-up to ACP Specialist referral from:    [x] PCP  [] Provider   [] Ambulatory Care Management [] Other     For:                  [x] Advance Directive Assistance              [] Complete Portable DNR order              [] Complete POST/MOST              [] Code Status Discussion             [] Discuss Goals of Care             [] Early ACP Decision-Making              [] Other (Specify)    Date Referral Received: 9/29/22    Today's Outreach:  [x] First   [] Second  [] Third       Third outreach made by: [] Phone  [] Email / mail    [] WAPAt     Intervention:  [x] Spoke with Patient   [] Left VM requesting return call      Outcome:   LPN spoke with the pt who wishes to move forward in having an ACP conversation with an ACP specialist. Pt is alert and oriented x4. Appointment scheduled for 10/14/22 at 1 pm. ACP information has been mailed to the pt for review prior to the appointment. Next Step:   [x] ACP scheduled conversation  [] Outreach again in one week               [x] Email / Mail ACP Info Sheets  [] Email / Mail Advance Directive   [] Closing referral.  Routing closure to referring provider/staff and to ACP Specialist . [] Closure letter mailed to patient with invitation to contact ACP Specialist if / when ready.   Thank you for this referral.

## 2022-10-05 ENCOUNTER — OFFICE VISIT (OUTPATIENT)
Dept: ORTHOPEDIC SURGERY | Age: 87
End: 2022-10-05
Payer: MEDICARE

## 2022-10-05 VITALS — HEART RATE: 84 BPM | TEMPERATURE: 97.3 F | BODY MASS INDEX: 24.22 KG/M2 | WEIGHT: 164 LBS | OXYGEN SATURATION: 99 %

## 2022-10-05 DIAGNOSIS — M20.41 HAMMER TOE OF RIGHT FOOT: ICD-10-CM

## 2022-10-05 DIAGNOSIS — M79.671 RIGHT FOOT PAIN: ICD-10-CM

## 2022-10-05 DIAGNOSIS — L84 CALLUS OF FOOT: ICD-10-CM

## 2022-10-05 DIAGNOSIS — S93.124A: ICD-10-CM

## 2022-10-05 DIAGNOSIS — M19.071 PRIMARY OSTEOARTHRITIS OF RIGHT FOOT: ICD-10-CM

## 2022-10-05 DIAGNOSIS — M20.11 HALLUX VALGUS, RIGHT: Primary | ICD-10-CM

## 2022-10-05 PROCEDURE — G8427 DOCREV CUR MEDS BY ELIG CLIN: HCPCS | Performed by: ORTHOPAEDIC SURGERY

## 2022-10-05 PROCEDURE — 73630 X-RAY EXAM OF FOOT: CPT | Performed by: ORTHOPAEDIC SURGERY

## 2022-10-05 PROCEDURE — G8420 CALC BMI NORM PARAMETERS: HCPCS | Performed by: ORTHOPAEDIC SURGERY

## 2022-10-05 PROCEDURE — 99203 OFFICE O/P NEW LOW 30 MIN: CPT | Performed by: ORTHOPAEDIC SURGERY

## 2022-10-05 PROCEDURE — G8432 DEP SCR NOT DOC, RNG: HCPCS | Performed by: ORTHOPAEDIC SURGERY

## 2022-10-05 PROCEDURE — G8536 NO DOC ELDER MAL SCRN: HCPCS | Performed by: ORTHOPAEDIC SURGERY

## 2022-10-05 PROCEDURE — 1123F ACP DISCUSS/DSCN MKR DOCD: CPT | Performed by: ORTHOPAEDIC SURGERY

## 2022-10-05 PROCEDURE — 1101F PT FALLS ASSESS-DOCD LE1/YR: CPT | Performed by: ORTHOPAEDIC SURGERY

## 2022-10-05 RX ORDER — TIMOLOL MALEATE 5 MG/ML
SOLUTION/ DROPS OPHTHALMIC
COMMUNITY
Start: 2022-10-03

## 2022-10-05 NOTE — PROGRESS NOTES
AMBULATORY PROGRESS NOTE      Patient: Kole Pena             MRN: [id number]     SSN: [id number] Body mass index is 24.22 kg/m². YOB: 1934     AGE: 80 y.o. EX: male    PCP: Yasmine Devi MD       IMPRESSION //  DIAGNOSIS AND TREATMENT PLAN      Kole Pena has a diagnosis of:      He has a severe problem, severe bunion deformity, rigid, with a rigid #2 toe crossover deformity. Conservative treatment consist of extra-depth shoe. Surgical treatment would consist either of a great toe first MTP arthrodesis with a right #2 toe metatarsal head resection Deejay arthroplasty, with a hammertoe corrective PIP procedure. Other options, may include isolated right #2 toe amputation. After lengthy discussion, we will try the extra-depth shoe, should this not helping, then we will rediscuss surgical options for him. Prior to any surgery however, did obtain PVL arterial studies on him. DIAGNOSES    1. Hallux valgus, right    2. Primary osteoarthritis of right foot    3. Dislocation of MTP joint of right lesser toe(s), init    4. Hammer toe of right foot    5. Right foot pain    6. Callus of foot        Orders Placed This Encounter    AMB POC XRAY, FOOT; COMPLETE, 3+ VIEW     Order Specific Question:   Weight bearing? Answer:   Yes    timolol (TIMOPTIC) 0.5 % ophthalmic solution     Sig: instill 1 drop into both eyes once daily          PLAN:    1. I obtained right foot 3V weightbearing XR in the office today. 2. I discussed with the patient conservative and surgical treatment options for his bunion and hammertoe.       RTO 6 weeks    Patient Instructions   Tinactin ointment - 2 x daily in right 3rd and 4th interdigital spaces    Extra-depth shoes  Dr. Asa Metzger, and Pro[employer] shoes   [employer]  [facility] [address] [facility], [address] [place] [address]  Phone: [phone]        Please follow up with your PCP for any health maintenance as recommended         Mitra Garibay Reinaldo Phalen  expresses understanding of the diagnosis, treatment plan, and all of their proposed questions were answered to their satisfaction. Patient education has been provided re the diagnoses. HPI //  [address] [facility] Chema Elizabeth IS A 80 y.o. male who is a/an  new patient, presenting to my outpatient office for evaluation of  the following chief complaint(s):     Chief Complaint   Patient presents with    Foot Pain     Rt         Emily Muhammad presents today with right foot pain. He complains of pain in the dorsal right foot over a severe right bunion and 2nd toe crossed over the 1st toe dorsally. He notes he cut a segment out of the top of his right shoe to accommodate his toe. Of note, Emily Muhammad is employed at [employer]. Visit Vitals  Pulse 84   Temp 97.3 °F (36.3 °C) (Temporal)   Wt 164 lb (74.4 kg)   SpO2 99%   BMI 24.22 kg/m²       Appearance: Alert, well appearing and pleasant patient who is in no distress, oriented to person, place/time, and who follows commands. Normal dress/motor activity/thought processes/memory. This patient is accompanied in the examination room by his  self. Patient arrives to office via: without assistive device. Psychiatric:  Normal Affect/mood. Judgement, behavior, and conduct are appropriate. Speech normal in context and clarity, memory intact grossly, no involuntary movements - tremors. H EENT (2): Head normocephalic & atraumatic. Eye: pupils are round// EOM are intact // Neck: ROM WNL  // Hearings Intact   Respiratory: Breathing non labored     ANKLE/FOOT right    Gait: antalgic and quick  Tenderness: mild over the   #2 toe rigid hammertoe at the PIP region. Nontender to the great toe severe deformity   hallus valgus. Cutaneous: Callus over dorsal PIP joint of rigid 2nd hammertoe and over dorsal 5th toe. Moisture in 3rd and 4th interdigital spaces, possible nascent fungal infection. Mild venous statis dermatitis.   Joint Motion: Extreme poor motion of the right great toe present to be, poor motion of right M2 toe rigid hammertoe, no severe motion, the right #2 toe MTP joint as it is crossover type deformity. WNL   Joint / Tendon Stability:  No Ankle or Subtalar instability or joint laxity. No peroneal sublux ability or dislocation  Alignment: Severe bunion. 2nd crossover toe deformity. Neuro Motor/Sensory: NL/NL  Vascular: NL foot/ankle pulses,   Lymphatics: No extremity lymphedema, No calf swelling, no tenderness to calf muscles. CHART REVIEW     Grayson Fernandez has been experiencing pain and discomfort confirmed as outlined in the pain assessment outlined below.  was reviewed by Elda Benito MD, on 10/5/2022. 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 has a past surgical history that includes hx other surgical (1975); foot/toes surgery proc unlisted; pr total hip arthroplasty (Left, 6/2015); and hx hip replacement (Right). family history includes Cancer in his mother; Heart Attack in his daughter.    Current Outpatient Medications   Medication Sig    finasteride (PROSCAR) 5 mg tablet take 1 tablet by mouth once daily    tamsulosin (FLOMAX) 0.4 mg capsule take 1 capsule by mouth once daily    ferrous sulfate 325 mg (65 mg iron) tablet take 1 tablet by mouth daily before breakfast    amLODIPine (NORVASC) 10 mg tablet 1 tab(s)    cyanocobalamin (Vitamin B-12) 1,000 mcg tablet Take 1 Tablet by mouth daily. diclofenac (VOLTAREN) 1 % gel Apply  to affected area four (4) times daily. pantoprazole (PROTONIX) 40 mg tablet TAKE 1 TABLET EVERY DAY    acetaminophen (TYLENOL) 325 mg tablet Take  by mouth every four (4) hours as needed for Pain. aspirin 81 mg chewable tablet Take 81 mg by mouth daily. timolol (TIMOPTIC) 0.5 % ophthalmic solution instill 1 drop into both eyes once daily     No current facility-administered medications for this visit. No Known Allergies  Social History     Occupational History    Not on file   Tobacco Use    Smoking status: Former     Packs/day: 1.00     Years: 25.00     Pack years: 25.00     Types: Cigarettes     Quit date: 1990     Years since quittin.3    Smokeless tobacco: Never   Substance and Sexual Activity    Alcohol use: Yes     Comment: occ    Drug use: No    Sexual activity: Not Currently     Partners: Female       reports that he quit smoking about 32 years ago. His smoking use included cigarettes. He has a 25.00 pack-year smoking history. He has never used smokeless tobacco. He reports current alcohol use. He reports that he does not use drugs.       DIAGNOSTIC LAB DATA      Lab Results   Component Value Date/Time    Hemoglobin A1c 5.9 (H) 2022 02:50 PM    Hemoglobin A1c 5.9 (H) 2021 12:38 PM    Hemoglobin A1c 6.0 (H) 2020 12:00 AM    //   Lab Results   Component Value Date/Time    Glucose 69 (L) 2022 02:50 PM        Lab Results   Component Value Date/Time    Hemoglobin A1c (POC) 5.3 2022 09:42 AM         No results found for: VITD3, [place], VD3RIA, [id number]     Drug Screen Most Recent Result Date       DRUG SCREEN, URINE  Collected: 2015 12:12 PM (Final result)                      REVIEW OF SYSTEMS : 10/5/2022  ALL BELOW ARE Negative except : SEE HPI All other systems reviewed and are negative. 12 point review of systems otherwise negative unless noted in HPI. RADIOGRAPHS// IMAGING//DIAGNOSTIC DATA     Orders Placed This Encounter    AMB POC XRAY, FOOT; COMPLETE, 3+ VIEW    timolol (TIMOPTIC) 0.5 % ophthalmic solution            See other note from today    I have personally reviewed the images of the above study. The interpretation of this study is my professional opinion           I have spent 30 minutes reviewing the previous notes, reviewing diagnostic studies [Advanced  Imaging, Diagnostic test results (x-rays)] and had a direct face to face with the patient discussing the diagnosis and importance of compliance with the treatment and plan. There is  discussion for the potential for surgery, answering all questions, as well as documenting patient care coordination for this individual on the day of the visit. Disclaimer:     Sections of this note are dictated using utilizing voice recognition software, which may have resulted in some phonetic based errors in grammar and contents. Even though attempts were made to correct all the mistakes, some may have been missed, and remained in the body of the document. If questions arise, please contact our department. An electronic signature was used to authenticate this note. Roe Lemus may have a reminder for a \"due or due soon\" health maintenance. I have asked that he contact his primary care provider for follow-up on this health maintenance. Patient Instructions   Tinactin ointment - 2 x daily in right 3rd and 4th interdigital spaces    Extra-depth shoes  Dr. Karl Metzger, and [employer] [id number]  [facility], [address] [place] [address]  Phone: [phone]        Please follow up with your PCP for any health maintenance as recommended.                 Scribed by Delrae Babinski, as dictated by Perla Gray MD.   10/5/2022  9:42 AM

## 2022-10-05 NOTE — PATIENT INSTRUCTIONS
Tinactin ointment - 2 x daily in right 3rd and 4th interdigital spaces    Extra-depth shoes  Dr. Karl Metzger, and [employer]  [id number]  [place], [address] [facility]  Phone: [phone]

## 2022-10-14 ENCOUNTER — DOCUMENTATION ONLY (OUTPATIENT)
Dept: CASE MANAGEMENT | Age: 87
End: 2022-10-14

## 2022-10-14 NOTE — ACP (ADVANCE CARE PLANNING)
Advance Care Planning   Ambulatory ACP Specialist Patient Outreach    Date:  10/14/2022    ACP Specialist:  Gabriella Hdz RN    Outreach call to patient in follow-up to ACP Specialist referral from:    [x] PCP  [] Provider   [] Ambulatory Care Management [] Other     For:                  [x] Advance Directive Assistance              [] Complete Portable DNR order              [] Complete POST/MOST              [] Code Status Discussion             [] Discuss Goals of Care             [] Early ACP Decision-Making              [] Other (Specify)    Date Referral Received: 9/29/22    Today's Outreach:  [] First   [x] Second  [] Third       Third outreach made by: [] Phone  [] Email / mail    [] [employer]     Intervention:  [] Spoke with Patient   [] Left VM requesting return call      Outcome: RN attempted call to pt for scheduled ACP conversation. No answer, left message for return call. Gabriella Hdz RN         Next Step:   [] ACP scheduled conversation  [x] Outreach again in one week               [] Email / Mail ACP Info Sheets  [] Email / Mail Advance Directive   [] Closing referral.  Routing closure to referring provider/staff and to ACP Specialist . [] Closure letter mailed to patient with invitation to contact ACP Specialist if / when ready.   Thank you for this referral.

## 2022-10-21 ENCOUNTER — DOCUMENTATION ONLY (OUTPATIENT)
Dept: CASE MANAGEMENT | Age: 87
End: 2022-10-21

## 2022-10-21 NOTE — ACP (ADVANCE CARE PLANNING)
Advance Care Planning   Ambulatory ACP Specialist Patient Outreach    Date:  10/21/2022    ACP Specialist:  Cathy Redmond RN    Outreach call to patient in follow-up to ACP Specialist referral from:    [x] PCP  [] Provider   [] Ambulatory Care Management [] Other     For:                  [x] Advance Directive Assistance              [] Complete Portable DNR order              [] Complete POST/MOST              [] Code Status Discussion             [] Discuss Goals of Care             [] Early ACP Decision-Making              [] Other (Specify)    Date Referral Received: 9/29/22    Today's Outreach:  [] First   [x] Second  [] Third       Third outreach made by: [] Phone  [] Email / mail    [] MyChart     Intervention:  [x] Spoke with Patient   [] Left VM requesting return call      Outcome: RN called pt for follow to appt for ACP conversation that was scheduled for 10/14/22. Pt requested rescheduled appt for 10/24/22 @ 3:30PM.  Cathy Redmond RN         Next Step:   [x] ACP scheduled conversation  [] Outreach again in one week               [] Email / Mail [address]  [] Email / Mail Advance Directive   [] Closing referral.  Routing closure to referring provider/staff and to ACP Specialist . [] Closure letter mailed to patient with invitation to contact ACP Specialist if / when ready.   Thank you for this referral.

## 2022-10-24 ENCOUNTER — DOCUMENTATION ONLY (OUTPATIENT)
Dept: CASE MANAGEMENT | Age: 87
End: 2022-10-24

## 2022-10-24 NOTE — ACP (ADVANCE CARE PLANNING)
Advance Care Planning     Advance Care Planning Clinical Specialist  Conversation Note      Date of ACP Conversation: 10/24/22  Addendum 11/21/22    Conversation Conducted with:  Patient with Decision Making Capacity and his granddaughter, Miesha Swann Clinical Specialist: Vasyl Moses RN      Health Care Decision Maker:    Current Designated Health Care Decision Maker:     Primary Decision Maker: Yeny Tran - [phone]    Secondary Decision Maker: Suyapa Spring - Other Relative - [phone]      Care Preferences    Hospitalization: \"If your health worsens and it becomes clear that your chance of recovery is unlikely, what would your preference be regarding hospitalization? \"    Choice:  []  The patient wants hospitalization  [x]  The patient prefers comfort-focused treatment without hospitalization. Ventilation: \"If you were in your present state of health and suddenly became very ill and were unable to breathe on your own, what would your preference be about the use of a ventilator (breathing machine) if it were available to you? \"      If patient would desire the use of a ventilator (breathing machine), answer \"yes\", if not \"no\":yes    \"If your health worsens and it becomes clear that your chance of recovery is unlikely, what would your preference be about the use of a ventilator (breathing machine) if it were available to you? \"     Would the patient desire the use of a ventilator (breathing machine)? YES, at least for a while. Resuscitation  \"CPR works best to restart the heart when there is a sudden event, like a heart attack, in someone who is otherwise healthy. Unfortunately, CPR does not typically restart the heart for people who have serious health conditions or who are very sick. \"    \"In the event your heart stopped as a result of an underlying serious health condition, would you want attempts to be made to restart your heart (answer \"yes\" for attempt to resuscitate) or would you prefer a natural death (answer \"no\" for do not attempt to resuscitate)? \" yes      [x] Yes  [] No   Educated Patient / Clearence Golds regarding differences between Advance Directives and portable DNR orders. Length of ACP Conversation in minutes:  39    Conversation Outcomes:  [x] ACP discussion completed  [] Existing advance directive reviewed with patient; no changes to patient's previously recorded wishes   [x] New Advance Directive completed   [] Portable Do Not Resuscitate prepared for Provider review and signature  [] POLST/POST/MOLST/MOST prepared for Provider review and signature      Follow-up plan:    [] Schedule follow-up conversation to continue planning  [] Referred individual to Provider for additional questions/concerns   [x] Advised patient/agent/surrogate to review completed ACP document and update if needed with changes in condition, patient preferences or care setting     [x] This note routed to one or more involved healthcare providers    RN reviewed the above questions with pt and his answers are indicated. RN also reviewed the Advance Medical Directive with pt who advised what his preferences are and also named his health care decision makers. RN advised pt that two copies of his completed AMD will mailed to him (his preference as he does not have email) for signing in the presence of two witnesses who will also sign both copies. One copy is to be mailed to [facility] office (envelope provided) for scanning to his chart. This copy will be returned to him with additional copies for giving to his agents and other providers. Pt indicated understanding of the above. Magdalene Ashford RN    11/21/22 Addendum  RN attempted call to pt to inquire if he has received the two copies of his AMD that were mailed to him. No answer, message left for return call.  Magdalene Ashford RN

## 2022-12-21 ENCOUNTER — APPOINTMENT (OUTPATIENT)
Dept: GENERAL RADIOLOGY | Age: 87
End: 2022-12-21
Attending: EMERGENCY MEDICINE
Payer: MEDICARE

## 2022-12-21 ENCOUNTER — HOSPITAL ENCOUNTER (EMERGENCY)
Age: 87
Discharge: HOME | End: 2022-12-21
Attending: EMERGENCY MEDICINE
Payer: MEDICARE

## 2022-12-21 ENCOUNTER — APPOINTMENT (OUTPATIENT)
Dept: CT IMAGING | Age: 87
End: 2022-12-21
Attending: EMERGENCY MEDICINE
Payer: MEDICARE

## 2022-12-21 VITALS
SYSTOLIC BLOOD PRESSURE: 180 MMHG | HEIGHT: 69 IN | WEIGHT: 165 LBS | BODY MASS INDEX: 24.44 KG/M2 | RESPIRATION RATE: 14 BRPM | OXYGEN SATURATION: 94 % | TEMPERATURE: 97.5 F | DIASTOLIC BLOOD PRESSURE: 87 MMHG | HEART RATE: 77 BPM

## 2022-12-21 DIAGNOSIS — R03.0 ELEVATED BLOOD PRESSURE READING: ICD-10-CM

## 2022-12-21 DIAGNOSIS — I50.9 CONGESTIVE HEART FAILURE, UNSPECIFIED HF CHRONICITY, UNSPECIFIED HEART FAILURE TYPE (HCC): ICD-10-CM

## 2022-12-21 DIAGNOSIS — R06.00 DYSPNEA, UNSPECIFIED TYPE: Primary | ICD-10-CM

## 2022-12-21 LAB
ALBUMIN SERPL-MCNC: 3.4 G/DL (ref 3.4–5)
ALBUMIN/GLOB SERPL: 0.9 {RATIO} (ref 0.8–1.7)
ALP SERPL-CCNC: 76 U/L (ref 45–117)
ALT SERPL-CCNC: 22 U/L (ref 16–61)
ANION GAP SERPL CALC-SCNC: 3 MMOL/L (ref 3–18)
AST SERPL-CCNC: 21 U/L (ref 10–38)
BASOPHILS # BLD: 0.1 K/UL (ref 0–0.1)
BASOPHILS NFR BLD: 1 % (ref 0–2)
BILIRUB SERPL-MCNC: 0.7 MG/DL (ref 0.2–1)
BNP SERPL-MCNC: 6539 PG/ML (ref 0–1800)
BUN SERPL-MCNC: 27 MG/DL (ref 7–18)
BUN/CREAT SERPL: 20 (ref 12–20)
CALCIUM SERPL-MCNC: 8.9 MG/DL (ref 8.5–10.1)
CHLORIDE SERPL-SCNC: 107 MMOL/L (ref 100–111)
CO2 SERPL-SCNC: 27 MMOL/L (ref 21–32)
COVID-19 RAPID TEST, COVR: NOT DETECTED
CREAT SERPL-MCNC: 1.37 MG/DL (ref 0.6–1.3)
D DIMER PPP FEU-MCNC: 1.09 UG/ML(FEU)
DIFFERENTIAL METHOD BLD: ABNORMAL
EOSINOPHIL # BLD: 0.9 K/UL (ref 0–0.4)
EOSINOPHIL NFR BLD: 12 % (ref 0–5)
ERYTHROCYTE [DISTWIDTH] IN BLOOD BY AUTOMATED COUNT: 12.3 % (ref 11.6–14.5)
FLUAV AG NPH QL IA: NEGATIVE
FLUBV AG NOSE QL IA: NEGATIVE
GLOBULIN SER CALC-MCNC: 4 G/DL (ref 2–4)
GLUCOSE SERPL-MCNC: 94 MG/DL (ref 74–99)
HCT VFR BLD AUTO: 35.3 % (ref 36–48)
HGB BLD-MCNC: 11.8 G/DL (ref 13–16)
IMM GRANULOCYTES # BLD AUTO: 0 K/UL
IMM GRANULOCYTES NFR BLD AUTO: 0 %
LIPASE SERPL-CCNC: 90 U/L (ref 73–393)
LYMPHOCYTES # BLD: 1.2 K/UL (ref 0.9–3.6)
LYMPHOCYTES NFR BLD: 17 % (ref 21–52)
MCH RBC QN AUTO: 32.4 PG (ref 24–34)
MCHC RBC AUTO-ENTMCNC: 33.4 G/DL (ref 31–37)
MCV RBC AUTO: 97 FL (ref 78–100)
MONOCYTES # BLD: 0.4 K/UL (ref 0.05–1.2)
MONOCYTES NFR BLD: 5 % (ref 3–10)
NEUTS SEG # BLD: 4.6 K/UL (ref 1.8–8)
NEUTS SEG NFR BLD: 65 % (ref 40–73)
NRBC # BLD: 0 K/UL (ref 0–0.01)
NRBC BLD-RTO: 0 PER 100 WBC
PLATELET # BLD AUTO: 224 K/UL (ref 135–420)
PLATELET COMMENTS,PCOM: ABNORMAL
PMV BLD AUTO: 11.7 FL (ref 9.2–11.8)
POTASSIUM SERPL-SCNC: 4.3 MMOL/L (ref 3.5–5.5)
PROT SERPL-MCNC: 7.4 G/DL (ref 6.4–8.2)
RBC # BLD AUTO: 3.64 M/UL (ref 4.35–5.65)
RBC MORPH BLD: ABNORMAL
SODIUM SERPL-SCNC: 137 MMOL/L (ref 136–145)
SOURCE, COVRS: NORMAL
TROPONIN-HIGH SENSITIVITY: 50 NG/L (ref 0–78)
WBC # BLD AUTO: 7.2 K/UL (ref 4.6–13.2)

## 2022-12-21 PROCEDURE — 96374 THER/PROPH/DIAG INJ IV PUSH: CPT

## 2022-12-21 PROCEDURE — 83690 ASSAY OF LIPASE: CPT

## 2022-12-21 PROCEDURE — 74011250637 HC RX REV CODE- 250/637: Performed by: EMERGENCY MEDICINE

## 2022-12-21 PROCEDURE — 80053 COMPREHEN METABOLIC PANEL: CPT

## 2022-12-21 PROCEDURE — 83880 ASSAY OF NATRIURETIC PEPTIDE: CPT

## 2022-12-21 PROCEDURE — 87635 SARS-COV-2 COVID-19 AMP PRB: CPT

## 2022-12-21 PROCEDURE — 74011250636 HC RX REV CODE- 250/636: Performed by: EMERGENCY MEDICINE

## 2022-12-21 PROCEDURE — 71045 X-RAY EXAM CHEST 1 VIEW: CPT

## 2022-12-21 PROCEDURE — 93005 ELECTROCARDIOGRAM TRACING: CPT

## 2022-12-21 PROCEDURE — 74011000636 HC RX REV CODE- 636: Performed by: EMERGENCY MEDICINE

## 2022-12-21 PROCEDURE — 85025 COMPLETE CBC W/AUTO DIFF WBC: CPT

## 2022-12-21 PROCEDURE — 87804 INFLUENZA ASSAY W/OPTIC: CPT

## 2022-12-21 PROCEDURE — 99285 EMERGENCY DEPT VISIT HI MDM: CPT

## 2022-12-21 PROCEDURE — 84484 ASSAY OF TROPONIN QUANT: CPT

## 2022-12-21 PROCEDURE — 71275 CT ANGIOGRAPHY CHEST: CPT

## 2022-12-21 PROCEDURE — 85379 FIBRIN DEGRADATION QUANT: CPT

## 2022-12-21 RX ORDER — FUROSEMIDE 10 MG/ML
40 INJECTION INTRAMUSCULAR; INTRAVENOUS
Status: COMPLETED | OUTPATIENT
Start: 2022-12-21 | End: 2022-12-21

## 2022-12-21 RX ORDER — FUROSEMIDE 40 MG/1
40 TABLET ORAL DAILY
Qty: 10 TABLET | Refills: 0 | Status: SHIPPED | OUTPATIENT
Start: 2022-12-21 | End: 2022-12-30

## 2022-12-21 RX ORDER — CARVEDILOL 6.25 MG/1
6.25 TABLET ORAL 2 TIMES DAILY WITH MEALS
Qty: 30 TABLET | Refills: 0 | Status: SHIPPED | OUTPATIENT
Start: 2022-12-21

## 2022-12-21 RX ORDER — CARVEDILOL 6.25 MG/1
6.25 TABLET ORAL
Status: COMPLETED | OUTPATIENT
Start: 2022-12-21 | End: 2022-12-21

## 2022-12-21 RX ADMIN — CARVEDILOL 6.25 MG: 6.25 TABLET, FILM COATED ORAL at 15:37

## 2022-12-21 RX ADMIN — IOPAMIDOL 100 ML: 755 INJECTION, SOLUTION INTRAVENOUS at 11:58

## 2022-12-21 RX ADMIN — FUROSEMIDE 40 MG: 10 INJECTION, SOLUTION INTRAMUSCULAR; INTRAVENOUS at 13:04

## 2022-12-21 NOTE — ED NOTES
Resting on stretcher talking to son who is at bedside. NAD noted. Call ball within reach. No complaints at this time.

## 2022-12-21 NOTE — ED PROVIDER NOTES
EMERGENCY DEPARTMENT HISTORY AND PHYSICAL EXAM    11:00 AM      Date: 12/21/2022  Patient Name: Josh Obando    History of Presenting Illness     Chief Complaint   Patient presents with    Shortness of Breath         History Provided By: Patient  Location/Duration/Severity/Modifying factors   Patient is an 80-year-old male with a history of hypertension, chronic pain, remote history of thromboembolism, that presents ER, presents emergency department complaint of chest pressure and shortness of breath that began overnight. 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 Patient has noted some mild left leg swelling but denies any regular swelling and no difficulty lying down but is having some shortness of breath when he tries to ambulate. Patient denies taking a fluid pill or ever needing 1. Patient follows closely with his primary doctor and denies any other aggravating or alleviating factors. The patient has had some mild nasal congestion and cough. The patient denies being a smoker, occasionally drinks alcohol, denies any drug use. Dayami Bland DO 11:02 AM        PCP: Migue Patel MD    Current Outpatient Medications   Medication Sig Dispense Refill    finasteride (PROSCAR) 5 mg tablet take 1 tablet by mouth once daily 90 Tablet 2    tamsulosin (FLOMAX) 0.4 mg capsule take 1 capsule by mouth once daily 90 Capsule 2    ferrous sulfate 325 mg (65 mg iron) tablet take 1 tablet by mouth daily before breakfast 90 Tablet 1    cyanocobalamin (Vitamin B-12) 1,000 mcg tablet Take 1 Tablet by mouth daily. 90 Tablet 3    pantoprazole (PROTONIX) 40 mg tablet TAKE 1 TABLET EVERY DAY 90 Tablet 3    aspirin 81 mg chewable tablet Take 81 mg by mouth daily.       timolol (TIMOPTIC) 0.5 % ophthalmic solution instill 1 drop into both eyes once daily      amLODIPine (NORVASC) 10 mg tablet 1 tab(s)      diclofenac (VOLTAREN) 1 % gel Apply  to affected area four (4) times daily. 100 g 11    acetaminophen (TYLENOL) 325 mg tablet Take  by mouth every four (4) hours as needed for Pain. 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 Mukesh Hartley Left 2015       Family History:  Family History   Problem Relation Age of Onset    Cancer Mother     Heart Attack Daughter        Social History:  Social History     Tobacco Use    Smoking status: Former     Packs/day: 1.00     Years: 25.00     Pack years: 25.00     Types: Cigarettes     Quit date: 1990     Years since quittin.5    Smokeless tobacco: Never   Substance Use Topics    Alcohol use: Yes     Comment: occ    Drug use: No       Allergies:  No Known Allergies      Review of Systems       Review of Systems   Constitutional:  Negative for activity change, fatigue and fever. HENT:  Negative for congestion and rhinorrhea. Eyes:  Negative for visual disturbance. Respiratory:  Positive for chest tightness and shortness of breath. Cardiovascular:  Negative for chest pain and palpitations. Gastrointestinal:  Negative for abdominal pain, diarrhea, nausea and vomiting. Genitourinary:  Negative for dysuria and hematuria. Musculoskeletal:  Negative for back pain. Skin:  Negative for rash. Neurological:  Negative for dizziness, weakness and light-headedness. All other systems reviewed and are negative.       Physical Exam   Visit Vitals  BP (!) 200/87   Pulse 80 Temp 97.5 °F (36.4 °C)   Resp 14   Ht 5' 9\" (1.753 m)   Wt 74.8 kg (165 lb)   SpO2 94%   BMI 24.37 kg/m²         Physical Exam  Vitals and nursing note reviewed. Constitutional:       General: He is not in acute distress. Appearance: He is well-developed. HENT:      Head: Normocephalic and atraumatic. Right Ear: External ear normal.      Left Ear: External ear normal.      Nose: Nose normal.   Eyes:      General: No scleral icterus. Conjunctiva/sclera: Conjunctivae normal.      Pupils: Pupils are equal, round, and reactive to light. Neck:      Thyroid: No thyromegaly. Vascular: No JVD. Trachea: No tracheal deviation. Cardiovascular:      Rate and Rhythm: Normal rate and regular rhythm. Heart sounds: Normal heart sounds. No murmur heard. No friction rub. No gallop. Pulmonary:      Effort: Pulmonary effort is normal.      Breath sounds: Examination of the right-lower field reveals decreased breath sounds. Examination of the left-lower field reveals decreased breath sounds. Decreased breath sounds present. Chest:      Chest wall: No tenderness. Abdominal:      General: Bowel sounds are normal. There is no distension. Palpations: Abdomen is soft. Tenderness: There is no abdominal tenderness. There is no guarding or rebound. Musculoskeletal:         General: No tenderness. Normal range of motion. Cervical back: Normal range of motion and neck supple. Right lower leg: Edema present. Left lower leg: Edema present. Lymphadenopathy:      Cervical: No cervical adenopathy. Skin:     General: Skin is warm and dry. Neurological:      Mental Status: He is alert and oriented to person, place, and time. Cranial Nerves: No cranial nerve deficit. Coordination: Coordination normal.      Comments: No sensory loss, Gait normal, Motor 5/5   Psychiatric:         Behavior: Behavior normal.         Thought Content:  Thought content normal. Judgment: Judgment normal.         Diagnostic Study Results     Labs -  Recent Results (from the past 12 hour(s))   EKG, 12 LEAD, INITIAL    Collection Time: 12/21/22  9:47 AM   Result Value Ref Range    Ventricular Rate 72 BPM    Atrial Rate 72 BPM    P-R Interval 162 ms    QRS Duration 80 ms    Q-T Interval 410 ms    QTC Calculation (Bezet) 448 ms    Calculated P Axis 63 degrees    Calculated R Axis -23 degrees    Calculated T Axis 78 degrees    Diagnosis       Sinus rhythm with occasional premature ventricular complexes  Anterior infarct , age undetermined  Abnormal ECG  When compared with ECG of 02-SEP-2020 10:23,  premature ventricular complexes are now present  premature atrial complexes are no longer present  Anterior infarct is now present  Nonspecific T wave abnormality no longer evident in Inferior leads     CBC WITH AUTOMATED DIFF    Collection Time: 12/21/22  9:50 AM   Result Value Ref Range    WBC 7.2 4.6 - 13.2 K/uL    RBC 3.64 (L) 4.35 - 5.65 M/uL    HGB 11.8 (L) 13.0 - 16.0 g/dL    HCT 35.3 (L) 36.0 - 48.0 %    MCV 97.0 78.0 - 100.0 FL    MCH 32.4 24.0 - 34.0 PG    MCHC 33.4 31.0 - 37.0 g/dL    RDW 12.3 11.6 - 14.5 %    PLATELET 694 621 - 236 K/uL    MPV 11.7 9.2 - 11.8 FL    NRBC 0.0 0  WBC    ABSOLUTE NRBC 0.00 0.00 - 0.01 K/uL    NEUTROPHILS 65 40 - 73 %    LYMPHOCYTES 17 (L) 21 - 52 %    MONOCYTES 5 3 - 10 %    EOSINOPHILS 12 (H) 0 - 5 %    BASOPHILS 1 0 - 2 %    IMMATURE GRANULOCYTES 0 %    ABS. NEUTROPHILS 4.6 1.8 - 8.0 K/UL    ABS. LYMPHOCYTES 1.2 0.9 - 3.6 K/UL    ABS. MONOCYTES 0.4 0.05 - 1.2 K/UL    ABS. EOSINOPHILS 0.9 (H) 0.0 - 0.4 K/UL    ABS. BASOPHILS 0.1 0.0 - 0.1 K/UL    ABS. IMM.  GRANS. 0.0 K/UL    DF MANUAL      PLATELET COMMENTS ADEQUATE PLATELETS      RBC COMMENTS NORMOCYTIC, NORMOCHROMIC     METABOLIC PANEL, COMPREHENSIVE    Collection Time: 12/21/22  9:50 AM   Result Value Ref Range    Sodium 137 136 - 145 mmol/L    Potassium 4.3 3.5 - 5.5 mmol/L    Chloride 107 100 - 111 mmol/L    CO2 27 21 - 32 mmol/L    Anion gap 3 3.0 - 18 mmol/L    Glucose 94 74 - 99 mg/dL    BUN 27 (H) 7.0 - 18 MG/DL    Creatinine 1.37 (H) 0.6 - 1.3 MG/DL    BUN/Creatinine ratio 20 12 - 20      eGFR 50 (L) >60 ml/min/1.73m2    Calcium 8.9 8.5 - 10.1 MG/DL    Bilirubin, total 0.7 0.2 - 1.0 MG/DL    ALT (SGPT) 22 16 - 61 U/L    AST (SGOT) 21 10 - 38 U/L    Alk. phosphatase 76 45 - 117 U/L    Protein, total 7.4 6.4 - 8.2 g/dL    Albumin 3.4 3.4 - 5.0 g/dL    Globulin 4.0 2.0 - 4.0 g/dL    A-G Ratio 0.9 0.8 - 1.7     LIPASE    Collection Time: 12/21/22  9:50 AM   Result Value Ref Range    Lipase 90 73 - 393 U/L   D DIMER    Collection Time: 12/21/22  9:50 AM   Result Value Ref Range    D DIMER 1.09 (H) <0.46 ug/ml(FEU)   NT-PRO BNP    Collection Time: 12/21/22  9:50 AM   Result Value Ref Range    NT pro-BNP 6,539 (H) 0 - 1,800 PG/ML   TROPONIN-HIGH SENSITIVITY    Collection Time: 12/21/22  9:50 AM   Result Value Ref Range    Troponin-High Sensitivity 50 0 - 78 ng/L   COVID-19 RAPID TEST    Collection Time: 12/21/22  9:50 AM   Result Value Ref Range    Specimen source Nasopharyngeal      COVID-19 rapid test Not detected     INFLUENZA A & B AG (RAPID TEST)    Collection Time: 12/21/22  9:50 AM   Result Value Ref Range    Influenza A Antigen Negative NEG      Influenza B Antigen Negative NEG         Radiologic Studies -   CTA CHEST W OR W WO CONT   Final Result      1. No evidence of pulmonary embolus, aortic aneurysm, or dissection. 2. Pulmonary edema with bilateral pleural effusions. 3. Small hepatic cysts. 4. Small hiatal hernia. XR CHEST SNGL V   Final Result      Cardiomegaly with evidence of pulmonary vascular congestion and a small right   pleural effusion. Medical Decision Making   I am the first provider for this patient. I reviewed the vital signs, available nursing notes, past medical history, past surgical history, family history and social history.     Vital Signs-Reviewed the patient's vital signs. EKG: Sinus rhythm at 72, PVC, no STEMI, interpreted by me    Records Reviewed: Nursing Notes, Old Medical Records, Previous Radiology Studies, and Previous Laboratory Studies (Time of Review: 11:00 AM)    ED Course: Progress Notes, Reevaluation, and Consults:    Patient is feeling much better and diuresing well. Patient ambulated without any hypoxia and work-up is suggestive of volume overload. We will discuss plans with the patient and cardiology. Maria De Jesus Peralta DO 2:20 PM    I discussed case with the cardiology PA and discussed the patient feeling better with persistently elevated blood pressure and what appears to be new onset congestive heart failure. The patient has followed with Dr. Emily Manning in the past we discussed getting urgent follow-up instead of admission to the hospital.  TINA Weir was able to arrange follow-up in the office with an echocardiogram on 12/30/2022 at 1 PM.  We also discussed starting patient on Coreg 6.25 twice daily as well as Lasix until he has his evaluation echocardiogram.  If the patient is feeling better we will plan for close outpatient care with return if at all worsened or concerned. Maria De Jesus Peralta DO 3:32 PM    Discussed plan with the patient with her son at the bedside and the patient was given the Coreg and would like to go home. We discussed a low-sodium diet until he sees a cardiologist and he will return if at all worsen or concern. Workup and recommendations were reviewed with the patient and all questions were answered. The patient understands the plan and will proceed with close outpatient care. I have encouraged the patient to return if at all worsened or concerned.    Maria De Jesus Peralta DO 3:37 PM      Provider Notes (Medical Decision Making):   MDM  Number of Diagnoses or Management Options  Congestive heart failure, unspecified HF chronicity, unspecified heart failure type (HCC)  Dyspnea, unspecified type  Elevated blood pressure reading  Diagnosis management comments: Patient is an 60-year-old male is active for his age that presents emergency department with a complaint of shortness of breath. Patient's labs are suggestive of heart failure which is not something he has had before but is a history of hypertension. His labs were reviewed and the patient was given Lasix after discussion with cardiology will plan for close outpatient care and the patient will return if at all worsened or concerned. Procedures          Diagnosis     Clinical Impression:   1. Dyspnea, unspecified type    2. Congestive heart failure, unspecified HF chronicity, unspecified heart failure type (HCC)    3. Elevated blood pressure reading        Disposition: DC    Follow-up Information    None          Patient's Medications   Start Taking    No medications on file   Continue Taking    ACETAMINOPHEN (TYLENOL) 325 MG TABLET    Take  by mouth every four (4) hours as needed for Pain. AMLODIPINE (NORVASC) 10 MG TABLET    1 tab(s)    ASPIRIN 81 MG CHEWABLE TABLET    Take 81 mg by mouth daily. CYANOCOBALAMIN (VITAMIN B-12) 1,000 MCG TABLET    Take 1 Tablet by mouth daily. DICLOFENAC (VOLTAREN) 1 % GEL    Apply  to affected area four (4) times daily. FERROUS SULFATE 325 MG (65 MG IRON) TABLET    take 1 tablet by mouth daily before breakfast    FINASTERIDE (PROSCAR) 5 MG TABLET    take 1 tablet by mouth once daily    PANTOPRAZOLE (PROTONIX) 40 MG TABLET    TAKE 1 TABLET EVERY DAY    TAMSULOSIN (FLOMAX) 0.4 MG CAPSULE    take 1 capsule by mouth once daily    TIMOLOL (TIMOPTIC) 0.5 % OPHTHALMIC SOLUTION    instill 1 drop into both eyes once daily   These Medications have changed    No medications on file   Stop Taking    No medications on file     Disclaimer: Sections of this note are dictated using utilizing voice recognition software. Minor typographical errors may be present.  If questions arise, please do not hesitate to contact me or call our department.

## 2022-12-21 NOTE — ED NOTES
I have reviewed discharge instructions with the patient. The patient verbalized understanding. Current Discharge Medication List        START taking these medications    Details   carvediloL (Coreg) 6.25 mg tablet Take 1 Tablet by mouth two (2) times daily (with meals). Qty: 30 Tablet, Refills: 0  Start date: 12/21/2022      furosemide (Lasix) 40 mg tablet Take 1 Tablet by mouth daily for 10 days.   Qty: 10 Tablet, Refills: 0  Start date: 12/21/2022, End date: 12/31/2022

## 2022-12-21 NOTE — DISCHARGE INSTRUCTIONS
Make sure to start your new blood pressure medication and fluid medications and follow-up with a cardiologist on the 30th as planned. If you are at all worsened or concerned please return immediately. Watch your salt intake as it can make your blood pressure go up and your heart failure worse.

## 2022-12-21 NOTE — Clinical Note
Christiano Colbert was seen and treated in our emergency department on 12/21/2022.     Mr. Jessi Mayer should avoid strenuous activity until cleared by his cardiologist.    Becca Pierre MD

## 2022-12-22 LAB
ATRIAL RATE: 72 BPM
CALCULATED P AXIS, ECG09: 63 DEGREES
CALCULATED R AXIS, ECG10: -23 DEGREES
CALCULATED T AXIS, ECG11: 78 DEGREES
DIAGNOSIS, 93000: NORMAL
P-R INTERVAL, ECG05: 162 MS
Q-T INTERVAL, ECG07: 410 MS
QRS DURATION, ECG06: 80 MS
QTC CALCULATION (BEZET), ECG08: 448 MS
VENTRICULAR RATE, ECG03: 72 BPM

## 2022-12-30 ENCOUNTER — OFFICE VISIT (OUTPATIENT)
Dept: CARDIOLOGY CLINIC | Age: 87
End: 2022-12-30
Payer: MEDICARE

## 2022-12-30 VITALS
HEART RATE: 74 BPM | BODY MASS INDEX: 23.11 KG/M2 | WEIGHT: 156 LBS | OXYGEN SATURATION: 99 % | HEIGHT: 69 IN | DIASTOLIC BLOOD PRESSURE: 62 MMHG | SYSTOLIC BLOOD PRESSURE: 146 MMHG

## 2022-12-30 DIAGNOSIS — I10 HYPERTENSION, UNSPECIFIED TYPE: ICD-10-CM

## 2022-12-30 DIAGNOSIS — N18.30 STAGE 3 CHRONIC KIDNEY DISEASE, UNSPECIFIED WHETHER STAGE 3A OR 3B CKD (HCC): ICD-10-CM

## 2022-12-30 DIAGNOSIS — I50.33 ACUTE ON CHRONIC DIASTOLIC CONGESTIVE HEART FAILURE (HCC): Primary | ICD-10-CM

## 2022-12-30 RX ORDER — FUROSEMIDE 40 MG/1
40 TABLET ORAL DAILY
Qty: 30 TABLET | Refills: 6 | Status: SHIPPED | OUTPATIENT
Start: 2022-12-30

## 2022-12-30 NOTE — PROGRESS NOTES
Geovannychivo Ruthtiffany presents today for   Chief Complaint   Patient presents with    Follow-up     overdue       Geovanny Garsia preferred language for health care discussion is english/other. Is someone accompanying this pt? no    Is the patient using any DME equipment during [address]? no    Depression Screening:  3 most recent PHQ Screens 12/30/2022   Little interest or pleasure in doing things Not at all   Feeling down, depressed, irritable, or hopeless Not at all   Total Score PHQ 2 0       Learning Assessment:  Learning Assessment 12/30/2022   PRIMARY LEARNER Patient   HIGHEST LEVEL OF EDUCATION - PRIMARY LEARNER  -   BARRIERS PRIMARY LEARNER -   CO-LEARNER CAREGIVER -   PRIMARY LANGUAGE ENGLISH   LEARNER PREFERENCE PRIMARY DEMONSTRATION   ANSWERED BY patient   RELATIONSHIP SELF       Abuse Screening:  Abuse Screening Questionnaire 12/30/2022   Do you ever feel afraid of your partner? N   Are you in a relationship with someone who physically or mentally threatens you? N   Is it safe for you to go home? Y       Fall Risk  Fall Risk Assessment, last 12 mths 12/30/2022   Able to walk? Yes   Fall in past 12 months? 0   Do you feel unsteady? 0   Are you worried about falling 0   Is TUG test greater than 12 seconds? -   Is the gait abnormal? -   Number of falls in past 12 months -   Fall with injury? -           Pt currently taking Anticoagulant therapy? no    Pt currently taking Antiplatelet therapy ? Aspirin 81 mg daily      Coordination of Care:  1. Have you been to the ER, urgent care clinic since your last visit? Hospitalized since your last visit? no    2. Have you seen or consulted any other health care providers outside of the [address] [place] [address] since your last visit? Include any pap smears or colon screening.  no

## 2022-12-30 NOTE — PATIENT INSTRUCTIONS
Follow up with Dr. Woody Sears in 2-3 months  ECHO  Continue Furosemide 40mg daily  Start Jardiance 10mg daily  Lab work to be done in 2 weeks

## 2022-12-30 NOTE — PROGRESS NOTES
HISTORY OF PRESENT ILLNESS  Isabela Davidson is a 80 y.o. male. Follow-up  Associated symptoms include shortness of breath. Pertinent negatives include no chest pain, no abdominal pain and no headaches. Shortness of Breath  Pertinent negatives include no fever, no headaches, no cough, no wheezing, no PND, no orthopnea, no chest pain, no vomiting, no abdominal pain, no rash, no leg swelling and no claudication. Patient presents for an add-on office visit. He was initially referred here from the emergency room for evaluation of chest pain in 2020. He was supposed to undergo noninvasive cardiac testing including an echocardiogram and a stress test.  Unfortunately he no showed for both of these tests. He has not been seen in the office in over 2 years. He returns today at the request of the emergency room after being seen approximately 10 days ago for worsening shortness of breath. He was diagnosed with decompensated heart failure based on elevated NT proBNP level, and abnormal imaging which showed bilateral pleural effusions on his CT scan with pulmonary edema present. He was given a dosage of IV furosemide and sent home with oral furosemide 40 mg daily which he is been taking for the past 9 days. He was also started on carvedilol to help with blood pressure control. He states his shortness of breath has improved with the diuretics. He still complains of exertional dyspnea but is much better than it was just 2 weeks ago. He denies any leg swelling, no further orthopnea, no PND. 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  30 Tablet 6    empagliflozin (Jardiance) 10 mg tablet Take 1 Tablet by mouth daily. 30 Tablet 6    carvediloL (Coreg) 6.25 mg tablet Take 1 Tablet by mouth two (2) times daily (with meals). 30 Tablet 0    timolol (TIMOPTIC) 0.5 % ophthalmic solution instill 1 drop into both eyes once daily      finasteride (PROSCAR) 5 mg tablet take 1 tablet by mouth once daily 90 Tablet 2    tamsulosin (FLOMAX) 0.4 mg capsule take 1 capsule by mouth once daily 90 Capsule 2    ferrous sulfate 325 mg (65 mg iron) tablet take 1 tablet by mouth daily before breakfast 90 Tablet 1    amLODIPine (NORVASC) 10 mg tablet 1 tab(s)      cyanocobalamin (Vitamin B-12) 1,000 mcg tablet Take 1 Tablet by mouth daily. 90 Tablet 3    diclofenac (VOLTAREN) 1 % gel Apply  to affected area four (4) times daily. 100 g 11    pantoprazole (PROTONIX) 40 mg tablet TAKE 1 TABLET EVERY DAY 90 Tablet 3    acetaminophen (TYLENOL) 325 mg tablet Take  by mouth every four (4) hours as needed for Pain. aspirin 81 mg chewable tablet Take 81 mg by mouth daily. No Known Allergies     Social History     Tobacco Use    Smoking status: Former     Packs/day: 1.00     Years: 25.00     Pack years: 25.00     Types: Cigarettes     Quit date: 1990     Years since quittin.6    Smokeless tobacco: Never   Vaping Use    Vaping Use: Never used   Substance Use Topics    Alcohol use: Yes     Comment: occ    Drug use: No     Family History   Problem Relation Age of Onset    Cancer Mother     Heart Attack Daughter          Review of Systems   Constitutional:  Negative for chills, fever and weight loss. HENT:  Negative for nosebleeds. Eyes:  Negative for blurred vision and double vision. Respiratory:  Positive for shortness of breath. Negative for cough and wheezing. Cardiovascular:  Negative for chest pain, palpitations, orthopnea, claudication, leg swelling and PND. Gastrointestinal:  Negative for abdominal pain, heartburn, nausea and vomiting. Genitourinary:  Negative for dysuria and hematuria.    Musculoskeletal:  Negative for falls and myalgias. Skin:  Negative for rash. Neurological:  Negative for dizziness, focal weakness and headaches. Endo/Heme/Allergies:  Does not bruise/bleed easily. Psychiatric/Behavioral:  Negative for substance abuse. Visit Vitals  BP (!) 146/62 (BP 1 Location: Left upper arm, BP Patient Position: Sitting, BP Cuff Size: Adult)   Pulse 74   Ht 5' 9\" (1.753 m)   Wt 70.8 kg (156 lb)   SpO2 99%   BMI 23.04 kg/m²       Physical Exam  Constitutional:       Appearance: He is well-developed. HENT:      Head: Normocephalic and atraumatic. Eyes:      Conjunctiva/sclera: Conjunctivae normal.   Neck:      Vascular: No carotid bruit or JVD. Cardiovascular:      Rate and Rhythm: Normal rate and regular rhythm. Pulses: Normal pulses. Heart sounds: S1 normal and S2 normal. Heart sounds are distant. No murmur heard. No gallop. No S3 sounds. Pulmonary:      Breath sounds: Examination of the right-lower field reveals decreased breath sounds. Examination of the left-lower field reveals decreased breath sounds. Decreased breath sounds present. No wheezing, rhonchi or rales. Abdominal:      General: Bowel sounds are normal.      Palpations: Abdomen is soft. Tenderness: There is no abdominal tenderness. Musculoskeletal:         General: No swelling, tenderness or deformity. Cervical back: Neck supple. Skin:     General: Skin is warm and dry. Neurological:      General: No focal deficit present. Mental Status: He is alert and oriented to person, place, and time. Psychiatric:         Behavior: Behavior normal.         Thought Content: Thought content normal.     EKG: Normal sinus rhythm, leftward axis, occasional atrial premature contraction, nonspecific ST abnormality. Borderline poor R wave progression. Normal QTc interval.  No change compared to previous tracings. ASSESSMENT and PLAN  Encounter Diagnoses   Name Primary?     Acute on chronic diastolic congest[facility] failure ([facility] Yes    Hypertension, unspecified type     Stage 3 chronic kidney disease, unspecified whether stage 3a or 3b CKD ([facility]      Acute decompensated heart failure. Presumably due to preserved ejection fraction based on a remote echocardiogram showing preserved LV function many years ago. His symptoms improved significantly with furosemide 40 mg daily. I would continue this long-term and I have recommended adding Jardiance 10 mg daily to his regimen. An echocardiogram will be ordered for next month to evaluate his LV function. I would like to check a follow-up NT proBNP level and a Chem-7 panel in several weeks. Hypertension. Patient's blood pressure is mildly elevated in the office today. He is unclear what blood pressure medications he is taking. It is listed that he should be taking carvedilol and amlodipine, both of which I would continue. Chronic kidney disease. His most recent creatinine was 1.37 done in the ER earlier this month. His renal function will be reevaluated with repeat blood work next month as described above.     Follow-up in 2-3 months, sooner if needed

## 2023-03-07 ENCOUNTER — HOSPITAL ENCOUNTER (OUTPATIENT)
Facility: HOSPITAL | Age: 88
Discharge: HOME | End: 2023-03-10

## 2023-03-07 LAB — SENTARA SPECIMEN COLLECTION: NORMAL

## 2023-03-07 PROCEDURE — 99001 SPECIMEN HANDLING PT-LAB: CPT

## 2023-03-08 LAB
A/G RATIO: 1.2 RATIO (ref 1.1–2.6)
ALBUMIN SERPL-MCNC: 3.7 G/DL (ref 3.5–5)
ALP BLD-CCNC: 83 U/L (ref 40–125)
ALT SERPL-CCNC: 31 U/L (ref 5–40)
ANION GAP SERPL CALCULATED.3IONS-SCNC: 12 MMOL/L (ref 3–15)
AST SERPL-CCNC: 65 U/L (ref 10–37)
AVERAGE GLUCOSE: 119 MG/DL (ref 91–123)
BASOPHILS # BLD: 1 % (ref 0–2)
BASOPHILS ABSOLUTE: 0 K/UL (ref 0–0.2)
BILIRUB SERPL-MCNC: 0.2 MG/DL (ref 0.2–1.2)
BUN BLDV-MCNC: 35 MG/DL (ref 6–22)
CALCIUM SERPL-MCNC: 9.9 MG/DL (ref 8.4–10.5)
CHLORIDE BLD-SCNC: 105 MMOL/L (ref 98–110)
CO2: 24 MMOL/L (ref 20–32)
CREAT SERPL-MCNC: 1.6 MG/DL (ref 0.8–1.6)
EOSINOPHIL # BLD: 4 % (ref 0–6)
EOSINOPHILS ABSOLUTE: 0.3 K/UL (ref 0–0.5)
GLOBULIN: 3.1 G/DL (ref 2–4)
GLOMERULAR FILTRATION RATE: 40 ML/MIN/1.73 SQ.M.
GLUCOSE: 92 MG/DL (ref 70–99)
HBA1C MFR BLD: 5.8 % (ref 4.8–5.6)
HCT VFR BLD CALC: 40.4 % (ref 37.8–52.2)
HEMOGLOBIN: 12.6 G/DL (ref 12.6–17.1)
LYMPHOCYTES # BLD: 20 % (ref 20–45)
LYMPHOCYTES ABSOLUTE: 1.3 K/UL (ref 1–4.8)
MCH RBC QN AUTO: 32 PG (ref 26–34)
MCHC RBC AUTO-ENTMCNC: 31 G/DL (ref 31–36)
MCV RBC AUTO: 102 FL (ref 80–95)
MONOCYTES ABSOLUTE: 0.8 K/UL (ref 0.1–1)
MONOCYTES: 12 % (ref 3–12)
NEUTROPHILS ABSOLUTE: 4.2 K/UL (ref 1.8–7.7)
NEUTROPHILS: 63 % (ref 40–75)
PDW BLD-RTO: 12.8 % (ref 10–15.5)
PLATELET # BLD: 183 K/UL (ref 140–440)
PMV BLD AUTO: 12.7 FL (ref 9–13)
POTASSIUM SERPL-SCNC: 4.3 MMOL/L (ref 3.5–5.5)
RBC: 3.97 M/UL (ref 3.8–5.8)
SODIUM BLD-SCNC: 141 MMOL/L (ref 133–145)
TOTAL PROTEIN: 6.8 G/DL (ref 6.2–8.1)
VITAMIN B-12: 896 PG/ML (ref 211–911)
WBC: 6.6 K/UL (ref 4–11)

## 2023-03-13 RX ORDER — FERROUS SULFATE 325(65) MG
TABLET ORAL
Qty: 90 TABLET | Refills: 0 | Status: SHIPPED | OUTPATIENT
Start: 2023-03-13

## 2023-03-13 RX ORDER — PANTOPRAZOLE SODIUM 40 MG/1
TABLET, DELAYED RELEASE ORAL
Qty: 90 TABLET | Refills: 0 | Status: SHIPPED | OUTPATIENT
Start: 2023-03-13

## 2023-03-13 NOTE — TELEPHONE ENCOUNTER
Requested Prescriptions     Pending Prescriptions Disp Refills    pantoprazole (PROTONIX) 40 MG tablet [Pharmacy Med Name: PANTOPRAZOLE SOD DR 40 MG TAB] 90 tablet      Sig: take 1 tablet by mouth once daily    ferrous sulfate (IRON 325) 325 (65 Fe) MG tablet [Pharmacy Med Name: FERROUS SULFATE 325 MG TABLET] 90 tablet      Sig: take 1 tablet by mouth daily before breakfast     Last OV: 9/29/2022  Last labs: 3/7/2023  Next OV: 3/30/2023

## 2023-03-30 RX ORDER — FERROUS SULFATE 325(65) MG
TABLET ORAL
Qty: 90 TABLET | Refills: 3 | Status: SHIPPED | OUTPATIENT
Start: 2023-03-30

## 2023-03-30 RX ORDER — PANTOPRAZOLE SODIUM 40 MG/1
TABLET, DELAYED RELEASE ORAL
Qty: 90 TABLET | Refills: 0 | Status: SHIPPED | OUTPATIENT
Start: 2023-03-30 | End: 2023-05-25

## 2023-03-30 RX ORDER — PSYLLIUM HUSK 3.4 G/7G
POWDER ORAL
Qty: 90 TABLET | Refills: 3 | Status: SHIPPED | OUTPATIENT
Start: 2023-03-30 | End: 2023-05-22

## 2023-04-19 PROBLEM — K92.0 HEMATEMESIS: Status: ACTIVE | Noted: 2023-04-19

## 2023-04-19 PROBLEM — K20.90 CHRONIC ESOPHAGITIS: Status: ACTIVE | Noted: 2023-04-19

## 2023-04-19 NOTE — PATIENT INSTRUCTIONS
healthcare professional. [address] any warranty or liability for your use of this information.        [facility]. please call to schedule follow up

## 2023-04-19 NOTE — PROGRESS NOTES
Chief Complaint   Patient presents with    Chronic Kidney Disease    Congestive Heart Failure    Anemia    Hypertension    Other     Hx of vitamin B-12 and spinal stenosis     Results     Review of results     Testicle Pain     C/o right groin/testicle pain       1. \"Have you been to the ER, urgent care clinic since your last visit? Hospitalized since your last visit? \" Yes 12/12/2022 [facility] for dyspnea / elevated blood pressure. 2. \"Have you seen or consulted any other health care providers outside of the [address] [place] [address] since your last visit? \" No     3. For patients aged 39-70: Has the patient had a colonoscopy / FIT/ Cologuard? NA - based on age      If the patient is female:    4. For patients aged 41-77: Has the patient had a mammogram within the past 2 years? NA - based on age or sex      11. For patients aged 21-65: Has the patient had a pap smear?  NA - based on age or sex

## 2023-04-20 ENCOUNTER — OFFICE VISIT (OUTPATIENT)
Age: 88
End: 2023-04-20
Payer: MEDICARE

## 2023-04-20 VITALS
OXYGEN SATURATION: 98 % | WEIGHT: 154 LBS | BODY MASS INDEX: 22.81 KG/M2 | DIASTOLIC BLOOD PRESSURE: 80 MMHG | HEART RATE: 64 BPM | RESPIRATION RATE: 16 BRPM | SYSTOLIC BLOOD PRESSURE: 132 MMHG | HEIGHT: 69 IN | TEMPERATURE: 98.4 F

## 2023-04-20 DIAGNOSIS — I10 ESSENTIAL (PRIMARY) HYPERTENSION: Primary | ICD-10-CM

## 2023-04-20 DIAGNOSIS — N18.30 STAGE 3 CHRONIC KIDNEY DISEASE, UNSPECIFIED WHETHER STAGE 3A OR 3B CKD (HCC): ICD-10-CM

## 2023-04-20 DIAGNOSIS — M48.061 SPINAL STENOSIS, LUMBAR REGION WITHOUT NEUROGENIC CLAUDICATION: ICD-10-CM

## 2023-04-20 DIAGNOSIS — E53.8 DEFICIENCY OF OTHER SPECIFIED B GROUP VITAMINS: ICD-10-CM

## 2023-04-20 DIAGNOSIS — D50.9 IRON DEFICIENCY ANEMIA, UNSPECIFIED IRON DEFICIENCY ANEMIA TYPE: ICD-10-CM

## 2023-04-20 DIAGNOSIS — I50.9 CHRONIC CONGESTIVE HEART FAILURE, UNSPECIFIED HEART FAILURE TYPE (HCC): ICD-10-CM

## 2023-04-20 DIAGNOSIS — N40.1 BENIGN PROSTATIC HYPERPLASIA WITH LOWER URINARY TRACT SYMPTOMS, SYMPTOM DETAILS UNSPECIFIED: ICD-10-CM

## 2023-04-20 DIAGNOSIS — R73.01 IMPAIRED FASTING GLUCOSE: ICD-10-CM

## 2023-04-20 PROBLEM — I50.33 ACUTE ON CHRONIC DIASTOLIC (CONGESTIVE) HEART FAILURE (HCC): Status: RESOLVED | Noted: 2023-04-20 | Resolved: 2023-04-20

## 2023-04-20 PROBLEM — I50.33 ACUTE ON CHRONIC DIASTOLIC (CONGESTIVE) HEART FAILURE (HCC): Status: ACTIVE | Noted: 2023-04-20

## 2023-04-20 PROCEDURE — 1123F ACP DISCUSS/DSCN MKR DOCD: CPT | Performed by: FAMILY MEDICINE

## 2023-04-20 PROCEDURE — 99214 OFFICE O/P EST MOD 30 MIN: CPT | Performed by: FAMILY MEDICINE

## 2023-04-20 RX ORDER — EMPAGLIFLOZIN 10 MG/1
10 TABLET, FILM COATED ORAL DAILY
COMMUNITY
Start: 2023-02-27

## 2023-04-20 RX ORDER — FUROSEMIDE 40 MG/1
40 TABLET ORAL DAILY
COMMUNITY
Start: 2023-02-27

## 2023-04-20 SDOH — ECONOMIC STABILITY: INCOME INSECURITY: HOW HARD IS IT FOR YOU TO PAY FOR THE VERY BASICS LIKE FOOD, HOUSING, MEDICAL CARE, AND HEATING?: SOMEWHAT HARD

## 2023-04-20 SDOH — ECONOMIC STABILITY: FOOD INSECURITY: WITHIN THE PAST 12 MONTHS, THE FOOD YOU BOUGHT JUST DIDN'T LAST AND YOU DIDN'T HAVE MONEY TO GET MORE.: SOMETIMES TRUE

## 2023-04-20 SDOH — ECONOMIC STABILITY: HOUSING INSECURITY
IN THE LAST 12 MONTHS, WAS THERE A TIME WHEN YOU DID NOT HAVE A STEADY PLACE TO SLEEP OR SLEPT IN A SHELTER (INCLUDING NOW)?: NO

## 2023-04-20 SDOH — HEALTH STABILITY: PHYSICAL HEALTH: ON AVERAGE, HOW MANY DAYS PER WEEK DO YOU ENGAGE IN MODERATE TO STRENUOUS EXERCISE (LIKE A BRISK WALK)?: 3 DAYS

## 2023-04-20 SDOH — HEALTH STABILITY: PHYSICAL HEALTH: ON AVERAGE, HOW MANY MINUTES DO YOU ENGAGE IN EXERCISE AT THIS LEVEL?: 40 MIN

## 2023-04-20 SDOH — ECONOMIC STABILITY: FOOD INSECURITY: WITHIN THE PAST 12 MONTHS, YOU WORRIED THAT YOUR FOOD WOULD RUN OUT BEFORE YOU GOT MONEY TO BUY MORE.: OFTEN TRUE

## 2023-04-20 ASSESSMENT — PATIENT HEALTH QUESTIONNAIRE - PHQ9
SUM OF ALL RESPONSES TO PHQ QUESTIONS 1-9: 0
SUM OF ALL RESPONSES TO PHQ QUESTIONS 1-9: 0
7. TROUBLE CONCENTRATING ON THINGS, SUCH AS READING THE NEWSPAPER OR WATCHING TELEVISION: 0
9. THOUGHTS THAT YOU WOULD BE BETTER OFF DEAD, OR OF HURTING YOURSELF: 0
2. FEELING DOWN, DEPRESSED OR HOPELESS: 0
3. TROUBLE FALLING OR STAYING ASLEEP: 0
DEPRESSION UNABLE TO ASSESS: FUNCTIONAL CAPACITY MOTIVATION LIMITS ACCURACY
SUM OF ALL RESPONSES TO PHQ9 QUESTIONS 1 & 2: 0
SUM OF ALL RESPONSES TO PHQ QUESTIONS 1-9: 0
SUM OF ALL RESPONSES TO PHQ QUESTIONS 1-9: 0
1. LITTLE INTEREST OR PLEASURE IN DOING THINGS: 0
4. FEELING TIRED OR HAVING LITTLE ENERGY: 0
8. MOVING OR SPEAKING SO SLOWLY THAT OTHER PEOPLE COULD HAVE NOTICED. OR THE OPPOSITE, BEING SO FIGETY OR RESTLESS THAT YOU HAVE BEEN MOVING AROUND A LOT MORE THAN USUAL: 0
6. FEELING BAD ABOUT YOURSELF - OR THAT YOU ARE A FAILURE OR HAVE LET YOURSELF OR YOUR FAMILY DOWN: 0
5. POOR APPETITE OR OVEREATING: 0
10. IF YOU CHECKED OFF ANY PROBLEMS, HOW DIFFICULT HAVE THESE PROBLEMS MADE IT FOR YOU TO DO YOUR WORK, TAKE CARE OF THINGS AT HOME, OR GET ALONG WITH OTHER PEOPLE: 0

## 2023-04-20 ASSESSMENT — SOCIAL DETERMINANTS OF HEALTH (SDOH)
HOW OFTEN DO YOU ATTEND CHURCH OR RELIGIOUS SERVICES?: 1 TO 4 TIMES PER YEAR
WITHIN THE LAST YEAR, HAVE TO BEEN RAPED OR FORCED TO HAVE ANY KIND OF SEXUAL ACTIVITY BY YOUR PARTNER OR EX-PARTNER?: NO
WITHIN THE LAST YEAR, HAVE YOU BEEN AFRAID OF YOUR PARTNER OR EX-PARTNER?: NO
IN A TYPICAL WEEK, HOW MANY TIMES DO YOU TALK ON THE PHONE WITH FAMILY, FRIENDS, OR NEIGHBORS?: ONCE A WEEK
DO YOU BELONG TO ANY CLUBS OR ORGANIZATIONS SUCH AS CHURCH GROUPS UNIONS, FRATERNAL OR ATHLETIC GROUPS, OR SCHOOL GROUPS?: YES
HOW OFTEN DO YOU ATTENT MEETINGS OF THE CLUB OR ORGANIZATION YOU BELONG TO?: 1 TO 4 TIMES PER YEAR
WITHIN THE LAST YEAR, HAVE YOU BEEN KICKED, HIT, SLAPPED, OR OTHERWISE PHYSICALLY HURT BY YOUR PARTNER OR EX-PARTNER?: NO
HOW OFTEN DO YOU GET TOGETHER WITH FRIENDS OR RELATIVES?: ONCE A WEEK
WITHIN THE LAST YEAR, HAVE YOU BEEN HUMILIATED OR EMOTIONALLY ABUSED IN OTHER WAYS BY YOUR PARTNER OR EX-PARTNER?: NO

## 2023-04-20 ASSESSMENT — LIFESTYLE VARIABLES
HOW OFTEN DO YOU HAVE A DRINK CONTAINING ALCOHOL: NEVER
HOW MANY STANDARD DRINKS CONTAINING ALCOHOL DO YOU HAVE ON A TYPICAL DAY: PATIENT DOES NOT DRINK

## 2023-04-20 NOTE — PROGRESS NOTES
SUBJECTIVE  Chief Complaint   Patient presents with    Chronic Kidney Disease    Congestive Heart Failure    Anemia    Hypertension    Other     Hx of vitamin B-12 and spinal stenosis     Results     Review of results     Testicle Pain     C/o right groin/testicle pain      Back doing okay of Lyrica. He has been using tylenol with some relief. He has been advised by his spine specialist to follow-up with PCP since he is not a surgical candidate. He used to get relief with lidoderm patches but he has stopped that. He has a history of HTN. He is taking Norvasc. The patient denies any chest pain or chest tightness. Denies any dyspnea upon exertion. He has a history of a bleeding esophageal ulcer. He has no abdominal complaints . In the past he was diagnosed with a B12 deficiency. He is taking iron and B12. He stopped seeing GI. They recommended a PPI indefinitely which he says he is on. He has not had any melena or BRBPR.     ROS:  History obtained from the patient   Respiratory: no cough, shortness of breath, or wheezing. Cardiovascular: no chest pain, palpitations, or dyspnea on exertion  Neurological: no numbness, tingling, headache or dizziness  : no hematuria, dysuria, frequency, hesitancy, or nocturia. Diagnosed with right hydrocele and BPH. He was seeing urology. He saw them last year for testicular pain that he says has been chronic since then. He was lost to follow-up with them. OBJECTIVE  Blood pressure 132/80, pulse 64, temperature 98.4 °F (36.9 °C), temperature source Temporal, resp. rate 16, height 5' 9\" (1.753 m), weight 154 lb (69.9 kg), SpO2 98 %. General:  Alert, cooperative, well appearing, in no apparent distress. CV:  The heart sounds are regular in rate and rhythm. There is a normal S1 and S2. There or no murmurs. Lungs: Inspiratory and expiratory efforts are full and unlabored.   Lung sounds are clear and equal to auscultation throughout all lung fields

## 2023-05-22 RX ORDER — PSYLLIUM HUSK 3.4 G/7G
POWDER ORAL
Qty: 90 TABLET | Refills: 3 | Status: SHIPPED | OUTPATIENT
Start: 2023-05-22

## 2023-05-25 ENCOUNTER — OFFICE VISIT (OUTPATIENT)
Age: 88
End: 2023-05-25
Payer: MEDICARE

## 2023-05-25 VITALS
WEIGHT: 151 LBS | SYSTOLIC BLOOD PRESSURE: 128 MMHG | HEART RATE: 60 BPM | HEIGHT: 69 IN | OXYGEN SATURATION: 98 % | BODY MASS INDEX: 22.36 KG/M2 | DIASTOLIC BLOOD PRESSURE: 68 MMHG

## 2023-05-25 DIAGNOSIS — I50.32 CHRONIC HEART FAILURE WITH PRESERVED EJECTION FRACTION (HCC): Primary | ICD-10-CM

## 2023-05-25 DIAGNOSIS — N18.30 STAGE 3 CHRONIC KIDNEY DISEASE, UNSPECIFIED WHETHER STAGE 3A OR 3B CKD (HCC): ICD-10-CM

## 2023-05-25 DIAGNOSIS — I10 HYPERTENSION, UNSPECIFIED TYPE: ICD-10-CM

## 2023-05-25 PROCEDURE — 93000 ELECTROCARDIOGRAM COMPLETE: CPT | Performed by: INTERNAL MEDICINE

## 2023-05-25 PROCEDURE — 1123F ACP DISCUSS/DSCN MKR DOCD: CPT | Performed by: INTERNAL MEDICINE

## 2023-05-25 PROCEDURE — 99214 OFFICE O/P EST MOD 30 MIN: CPT | Performed by: INTERNAL MEDICINE

## 2023-05-25 ASSESSMENT — PATIENT HEALTH QUESTIONNAIRE - PHQ9
SUM OF ALL RESPONSES TO PHQ9 QUESTIONS 1 & 2: 0
1. LITTLE INTEREST OR PLEASURE IN DOING THINGS: 0
SUM OF ALL RESPONSES TO PHQ QUESTIONS 1-9: 0
2. FEELING DOWN, DEPRESSED OR HOPELESS: 0
SUM OF ALL RESPONSES TO PHQ QUESTIONS 1-9: 0

## 2023-05-25 ASSESSMENT — ENCOUNTER SYMPTOMS
ABDOMINAL DISTENTION: 0
ABDOMINAL PAIN: 0
NAUSEA: 0
SORE THROAT: 0
VOMITING: 0
COUGH: 0
SHORTNESS OF BREATH: 0

## 2023-05-25 ASSESSMENT — ANXIETY QUESTIONNAIRES
3. WORRYING TOO MUCH ABOUT DIFFERENT THINGS: 0
6. BECOMING EASILY ANNOYED OR IRRITABLE: 0
7. FEELING AFRAID AS IF SOMETHING AWFUL MIGHT HAPPEN: 0
GAD7 TOTAL SCORE: 0
2. NOT BEING ABLE TO STOP OR CONTROL WORRYING: 0
1. FEELING NERVOUS, ANXIOUS, OR ON EDGE: 0
5. BEING SO RESTLESS THAT IT IS HARD TO SIT STILL: 0
4. TROUBLE RELAXING: 0

## 2023-05-25 NOTE — PROGRESS NOTES
Ravin Speaker presents today for   Chief Complaint   Patient presents with    Follow-up     3 month       Ravin Speaker preferred language for health care discussion is english/other. Is someone accompanying this pt? no    Is the patient using any DME equipment during OV? no    Depression Screening:  Depression: Not at risk    PHQ-2 Score: 0        Learning Assessment:  Who is the primary learner? Patient    What is the preferred language for health care of the primary learner? ENGLISH    How does the primary learner prefer to learn new concepts? DEMONSTRATION    Answered By patient    Relationship to Learner SELF           Pt currently taking Anticoagulant therapy? no    Pt currently taking Antiplatelet therapy ? Aspirin 81 mg daily      Coordination of Care:  1. Have you been to the ER, urgent care clinic since your last visit? Hospitalized since your last visit? no    2. Have you seen or consulted any other health care providers outside of the [address] [place] [address] since your last visit? Include any pap smears or colon screening.  no
bruise/bleed easily. Psychiatric/Behavioral:  Negative for suicidal ideas. /68 (Site: Left Upper Arm, Position: Sitting, Cuff Size: Medium Adult)   Pulse 60   Ht 5' 9\" (1.753 m)   Wt 151 lb (68.5 kg)   SpO2 98%   BMI 22.30 kg/m²     Objective:   Physical Exam  Constitutional:       General: He is not in acute distress. HENT:      Head: Normocephalic. Neck:      Vascular: No carotid bruit or JVD. Cardiovascular:      Rate and Rhythm: Normal rate and regular rhythm. No extrasystoles are present. Heart sounds: No murmur heard. No gallop. Pulmonary:      Effort: Pulmonary effort is normal.      Breath sounds: No wheezing, rhonchi or rales. Abdominal:      General: Bowel sounds are normal. There is no distension. Palpations: Abdomen is soft. Tenderness: There is no abdominal tenderness. Musculoskeletal:         General: No swelling or deformity. Skin:     General: Skin is warm and dry. Findings: No rash. Neurological:      General: No focal deficit present. Mental Status: He is alert and oriented to person, place, and time. Psychiatric:         Mood and Affect: Mood normal.         Behavior: Behavior normal.       EKG: Normal sinus rhythm, normal axis, normal QTc interval, nonspecific ST abnormality. .  No change compared to the previous EKG. Assessment / Plan:     Chronic heart failure with preserved ejection fraction. This was diagnosed in December 2022 with an acute decompensation at that time. He has since stabilized on a stable dosage of furosemide 40 mg daily and Jardiance 10 mg daily. A repeat echocardiogram in January 2023 confirmed a preserved LV function, EF 60 to 65%, moderate concentric LVH with no significant valvular heart disease. I would continue his current medical regimen for this. Hypertension. Patient's blood pressure now appears well-controlled with carvedilol, amlodipine and diuretic therapy. Chronic kidney disease.   His

## 2023-06-22 NOTE — TELEPHONE ENCOUNTER
Last OV: 04/20/2023  Last labs:03/07/2023  Next OV and labs:  11/02/2023    Ferrous sulfate not due for refill. It was sent in to pharmacy on file on 3/23/2023 for #90/3 RF. Spoke with patient. He confirms that he still takes Pantoprazole 40 mg daily.

## 2023-06-23 RX ORDER — PANTOPRAZOLE SODIUM 40 MG/1
TABLET, DELAYED RELEASE ORAL
Qty: 90 TABLET | Refills: 0 | Status: SHIPPED | OUTPATIENT
Start: 2023-06-23

## 2023-06-23 RX ORDER — FERROUS SULFATE 325(65) MG
TABLET ORAL
Qty: 90 TABLET | Refills: 3 | Status: SHIPPED | OUTPATIENT
Start: 2023-06-23

## 2023-08-25 RX ORDER — EMPAGLIFLOZIN 10 MG/1
TABLET, FILM COATED ORAL
Qty: 90 TABLET | Refills: 3 | Status: SHIPPED | OUTPATIENT
Start: 2023-08-25

## 2023-08-25 RX ORDER — FUROSEMIDE 40 MG/1
TABLET ORAL
Qty: 90 TABLET | Refills: 3 | Status: SHIPPED | OUTPATIENT
Start: 2023-08-25

## 2023-11-01 NOTE — PROGRESS NOTES
Chief Complaint   Patient presents with    Anemia    Benign Prostatic Hypertrophy    Chronic Kidney Disease    Congestive Heart Failure    Hypertension    IFG    Other     Hx of lumbar spinal stenosis and neck (right side) numbness X 1 month (no pain noted)    Vitamin B-12 Def       1. \"Have you been to the ER, urgent care clinic since your last visit? Hospitalized since your last visit? \" No    2. \"Have you seen or consulted any other health care providers outside of the [address] [place] [address] since your last visit? \" No     3. For patients aged 43-73: Has the patient had a colonoscopy / FIT/ Cologuard?  NA - based on age

## 2023-11-01 NOTE — PATIENT INSTRUCTIONS
lifestyle, illnesses that may run in your family, and various assessments and screenings as appropriate. After reviewing your medical record and screening and assessments performed today your provider may have ordered immunizations, labs, imaging, and/or referrals for you. A list of these orders (if applicable) as well as your Preventive Care list are included within your After Visit Summary for your review. Other Preventive Recommendations:    A preventive eye exam performed by an eye specialist is recommended every 1-2 years to screen for glaucoma; cataracts, macular degeneration, and other eye disorders. A preventive dental visit is recommended every 6 months. Try to get at least 150 minutes of exercise per week or 10,000 steps per day on a pedometer . Order or download the FREE \"Exercise & Physical Activity: Your Everyday Guide\" from [employer]. Call 7-572.399.9926 or search [employer] online. You need 8234-3697 mg of calcium and 4913-4649 IU of vitamin D per day. It is possible to meet your calcium requirement with diet alone, but a vitamin D supplement is usually necessary to meet this goal.  When exposed to the sun, use a sunscreen that protects against both UVA and UVB radiation with an SPF of 30 or greater. Reapply every 2 to 3 hours or after sweating, drying off with a towel, or swimming. Always wear a seat belt when traveling in a car. Always wear a helmet when riding a bicycle or motorcycle.

## 2023-11-01 NOTE — PROGRESS NOTES
Chief Complaint   Patient presents with    Medicare AWV      1. \"Have you been to the ER, urgent care clinic since your last visit? Hospitalized since your last visit? \" No    2. \"Have you seen or consulted any other health care providers outside of the [address] [place] [address] since your last visit? \" No     3. For patients aged 43-73: Has the patient had a colonoscopy / FIT/ Cologuard?  NA - based on age

## 2023-11-02 ENCOUNTER — OFFICE VISIT (OUTPATIENT)
Age: 88
End: 2023-11-02
Payer: MEDICARE

## 2023-11-02 ENCOUNTER — OFFICE VISIT (OUTPATIENT)
Age: 88
End: 2023-11-02

## 2023-11-02 VITALS
WEIGHT: 155 LBS | BODY MASS INDEX: 22.96 KG/M2 | SYSTOLIC BLOOD PRESSURE: 136 MMHG | DIASTOLIC BLOOD PRESSURE: 68 MMHG | RESPIRATION RATE: 16 BRPM | HEIGHT: 69 IN | TEMPERATURE: 98.3 F | HEART RATE: 67 BPM | OXYGEN SATURATION: 97 %

## 2023-11-02 VITALS
HEART RATE: 67 BPM | OXYGEN SATURATION: 97 % | DIASTOLIC BLOOD PRESSURE: 68 MMHG | WEIGHT: 155 LBS | BODY MASS INDEX: 22.96 KG/M2 | HEIGHT: 69 IN | RESPIRATION RATE: 16 BRPM | TEMPERATURE: 98.3 F | SYSTOLIC BLOOD PRESSURE: 136 MMHG

## 2023-11-02 DIAGNOSIS — N18.30 STAGE 3 CHRONIC KIDNEY DISEASE, UNSPECIFIED WHETHER STAGE 3A OR 3B CKD (HCC): ICD-10-CM

## 2023-11-02 DIAGNOSIS — I10 ESSENTIAL (PRIMARY) HYPERTENSION: ICD-10-CM

## 2023-11-02 DIAGNOSIS — I10 ESSENTIAL (PRIMARY) HYPERTENSION: Primary | ICD-10-CM

## 2023-11-02 DIAGNOSIS — Z00.00 MEDICARE ANNUAL WELLNESS VISIT, SUBSEQUENT: Primary | ICD-10-CM

## 2023-11-02 DIAGNOSIS — R73.01 IMPAIRED FASTING GLUCOSE: ICD-10-CM

## 2023-11-02 DIAGNOSIS — Z87.11 HISTORY OF GASTRIC ULCER: ICD-10-CM

## 2023-11-02 DIAGNOSIS — E53.8 DEFICIENCY OF OTHER SPECIFIED B GROUP VITAMINS: ICD-10-CM

## 2023-11-02 DIAGNOSIS — M48.061 SPINAL STENOSIS, LUMBAR REGION WITHOUT NEUROGENIC CLAUDICATION: ICD-10-CM

## 2023-11-02 DIAGNOSIS — I50.9 CHRONIC CONGESTIVE HEART FAILURE, UNSPECIFIED HEART FAILURE TYPE (HCC): ICD-10-CM

## 2023-11-02 DIAGNOSIS — D50.9 IRON DEFICIENCY ANEMIA, UNSPECIFIED IRON DEFICIENCY ANEMIA TYPE: ICD-10-CM

## 2023-11-02 DIAGNOSIS — Z71.89 ACP (ADVANCE CARE PLANNING): ICD-10-CM

## 2023-11-02 DIAGNOSIS — N40.1 BENIGN PROSTATIC HYPERPLASIA WITH LOWER URINARY TRACT SYMPTOMS, SYMPTOM DETAILS UNSPECIFIED: ICD-10-CM

## 2023-11-02 PROCEDURE — G0439 PPPS, SUBSEQ VISIT: HCPCS | Performed by: FAMILY MEDICINE

## 2023-11-02 PROCEDURE — 99497 ADVNCD CARE PLAN 30 MIN: CPT | Performed by: FAMILY MEDICINE

## 2023-11-02 PROCEDURE — 1123F ACP DISCUSS/DSCN MKR DOCD: CPT | Performed by: FAMILY MEDICINE

## 2023-11-02 RX ORDER — FERROUS SULFATE 325(65) MG
1 TABLET ORAL
Qty: 90 TABLET | Refills: 1 | Status: SHIPPED | OUTPATIENT
Start: 2023-11-02

## 2023-11-02 RX ORDER — CARVEDILOL 6.25 MG/1
6.25 TABLET ORAL 2 TIMES DAILY WITH MEALS
Qty: 180 TABLET | Refills: 1 | Status: CANCELLED | OUTPATIENT
Start: 2023-11-02

## 2023-11-02 RX ORDER — FUROSEMIDE 40 MG/1
40 TABLET ORAL DAILY
Qty: 90 TABLET | Refills: 1 | Status: CANCELLED | OUTPATIENT
Start: 2023-11-02

## 2023-11-02 RX ORDER — AMLODIPINE BESYLATE 10 MG/1
10 TABLET ORAL DAILY
Qty: 90 TABLET | Refills: 1 | Status: CANCELLED | OUTPATIENT
Start: 2023-11-02

## 2023-11-02 RX ORDER — FERROUS SULFATE 325(65) MG
1 TABLET ORAL
Qty: 90 TABLET | Refills: 1 | Status: CANCELLED | OUTPATIENT
Start: 2023-11-02

## 2023-11-02 RX ORDER — PANTOPRAZOLE SODIUM 40 MG/1
40 TABLET, DELAYED RELEASE ORAL DAILY
Qty: 90 TABLET | Refills: 1 | Status: CANCELLED | OUTPATIENT
Start: 2023-11-02

## 2023-11-02 RX ORDER — PANTOPRAZOLE SODIUM 40 MG/1
40 TABLET, DELAYED RELEASE ORAL DAILY
Qty: 90 TABLET | Refills: 1 | Status: SHIPPED | OUTPATIENT
Start: 2023-11-02

## 2023-11-02 ASSESSMENT — PATIENT HEALTH QUESTIONNAIRE - PHQ9
4. FEELING TIRED OR HAVING LITTLE ENERGY: 0
3. TROUBLE FALLING OR STAYING ASLEEP: 0
5. POOR APPETITE OR OVEREATING: 0
1. LITTLE INTEREST OR PLEASURE IN DOING THINGS: 0
SUM OF ALL RESPONSES TO PHQ QUESTIONS 1-9: 0
2. FEELING DOWN, DEPRESSED OR HOPELESS: 0
SUM OF ALL RESPONSES TO PHQ QUESTIONS 1-9: 0
SUM OF ALL RESPONSES TO PHQ QUESTIONS 1-9: 0
10. IF YOU CHECKED OFF ANY PROBLEMS, HOW DIFFICULT HAVE THESE PROBLEMS MADE IT FOR YOU TO DO YOUR WORK, TAKE CARE OF THINGS AT HOME, OR GET ALONG WITH OTHER PEOPLE: 0
8. MOVING OR SPEAKING SO SLOWLY THAT OTHER PEOPLE COULD HAVE NOTICED. OR THE OPPOSITE, BEING SO FIGETY OR RESTLESS THAT YOU HAVE BEEN MOVING AROUND A LOT MORE THAN USUAL: 0
7. TROUBLE CONCENTRATING ON THINGS, SUCH AS READING THE NEWSPAPER OR WATCHING TELEVISION: 0
6. FEELING BAD ABOUT YOURSELF - OR THAT YOU ARE A FAILURE OR HAVE LET YOURSELF OR YOUR FAMILY DOWN: 0
9. THOUGHTS THAT YOU WOULD BE BETTER OFF DEAD, OR OF HURTING YOURSELF: 0
SUM OF ALL RESPONSES TO PHQ QUESTIONS 1-9: 0
SUM OF ALL RESPONSES TO PHQ9 QUESTIONS 1 & 2: 0

## 2023-11-02 ASSESSMENT — LIFESTYLE VARIABLES
HOW OFTEN DO YOU HAVE A DRINK CONTAINING ALCOHOL: MONTHLY OR LESS
HOW MANY STANDARD DRINKS CONTAINING ALCOHOL DO YOU HAVE ON A TYPICAL DAY: 1 OR 2

## 2023-11-02 NOTE — ACP (ADVANCE CARE PLANNING)
Advance Care Planning      Advance Care Planning (ACP) Physician/NP/PA (Provider) Conversation        Date of ACP Conversation: 11/2/2023    Conversation Conducted with:   Patient with [address] Decision Maker:      Primary Decision Maker: Loren Dacosta - [phone]    Secondary Decision Maker: Bruce Butler - [phone]      Care Preferences:    Patient's ACP is on file and is reviewed in depth with him. He did this earlier this year. Conversation Outcomes / Follow-Up Plan:   Review annually.    No changes endorsed at this time      Length of Voluntary ACP Conversation in minutes:  16 minutes      Puneet Gilmore MD

## 2023-12-04 ENCOUNTER — OFFICE VISIT (OUTPATIENT)
Age: 88
End: 2023-12-04
Payer: MEDICARE

## 2023-12-04 VITALS
HEIGHT: 69 IN | SYSTOLIC BLOOD PRESSURE: 136 MMHG | OXYGEN SATURATION: 100 % | HEART RATE: 68 BPM | DIASTOLIC BLOOD PRESSURE: 70 MMHG | BODY MASS INDEX: 22.51 KG/M2 | WEIGHT: 152 LBS

## 2023-12-04 DIAGNOSIS — R07.9 CHEST PAIN, UNSPECIFIED TYPE: ICD-10-CM

## 2023-12-04 DIAGNOSIS — I50.32 CHRONIC HEART FAILURE WITH PRESERVED EJECTION FRACTION (HCC): Primary | ICD-10-CM

## 2023-12-04 DIAGNOSIS — I10 HYPERTENSION, UNSPECIFIED TYPE: ICD-10-CM

## 2023-12-04 DIAGNOSIS — R55 NEAR SYNCOPE: ICD-10-CM

## 2023-12-04 DIAGNOSIS — N18.30 STAGE 3 CHRONIC KIDNEY DISEASE, UNSPECIFIED WHETHER STAGE 3A OR 3B CKD (HCC): ICD-10-CM

## 2023-12-04 PROCEDURE — 1123F ACP DISCUSS/DSCN MKR DOCD: CPT | Performed by: INTERNAL MEDICINE

## 2023-12-04 PROCEDURE — 93000 ELECTROCARDIOGRAM COMPLETE: CPT | Performed by: INTERNAL MEDICINE

## 2023-12-04 PROCEDURE — 99215 OFFICE O/P EST HI 40 MIN: CPT | Performed by: INTERNAL MEDICINE

## 2023-12-04 RX ORDER — ROSUVASTATIN CALCIUM 10 MG/1
1 TABLET, COATED ORAL
COMMUNITY
Start: 2023-12-02

## 2023-12-04 RX ORDER — AMLODIPINE BESYLATE 5 MG/1
5 TABLET ORAL DAILY
COMMUNITY
Start: 2023-12-02

## 2023-12-04 ASSESSMENT — PATIENT HEALTH QUESTIONNAIRE - PHQ9
SUM OF ALL RESPONSES TO PHQ QUESTIONS 1-9: 0
1. LITTLE INTEREST OR PLEASURE IN DOING THINGS: 0
2. FEELING DOWN, DEPRESSED OR HOPELESS: 0
SUM OF ALL RESPONSES TO PHQ QUESTIONS 1-9: 0
SUM OF ALL RESPONSES TO PHQ QUESTIONS 1-9: 0
SUM OF ALL RESPONSES TO PHQ9 QUESTIONS 1 & 2: 0
SUM OF ALL RESPONSES TO PHQ QUESTIONS 1-9: 0

## 2023-12-04 ASSESSMENT — ENCOUNTER SYMPTOMS
COUGH: 0
SHORTNESS OF BREATH: 0
VOMITING: 0
ABDOMINAL PAIN: 0
ABDOMINAL DISTENTION: 0
NAUSEA: 0
SORE THROAT: 0

## 2023-12-04 NOTE — PROGRESS NOTES
12/04/23     Tammy Navarro  is a 80 y.o. male     Chief Complaint   Patient presents with    Follow-up     6 month f/u     Chest Pain     Left/center chest tightness on/off    Dizziness     Dizzy spells on/off    ED Follow-up     ED 11/30/23 due to chest pain        Chest Pain   Associated symptoms include dizziness. Pertinent negatives include no abdominal pain, cough, fever, headaches, nausea, palpitations, shortness of breath, vomiting or weakness. Dizziness  Associated symptoms include chest pain. Pertinent negatives include no abdominal pain, arthralgias, chills, congestion, coughing, fatigue, fever, headaches, nausea, rash, sore throat, vomiting or weakness. Patient presents for a post hospital follow-up office visit. He was initially referred here from the emergency room for evaluation of chest pain in 2020. He was supposed to undergo noninvasive cardiac testing including an echocardiogram and a stress test.  Unfortunately he no showed for both of these tests. He was seen in the emergency room in December 2022 for worsening shortness of breath. He was diagnosed with decompensated heart failure based on elevated NT proBNP level, and abnormal imaging which showed bilateral pleural effusions on his CT scan with pulmonary edema present. He was given a dosage of IV furosemide and sent home with oral furosemide 40 mg daily which he is been taking for the past 9 days. He was also started on carvedilol to help with blood pressure control. He states his shortness of breath has improved with the diuretics. He was last seen in our office at the end of December 2022. He was instructed to continue his furosemide and also was started on Jardiance 10 mg daily. An echocardiogram was completed in January 2023 which showed preserved LV function, EF 60-65%, moderate concentric LVH, diastolic dysfunction, with mild mitral and tricuspid regurgitation. He was last seen in our office 6 months ago.   He was

## 2023-12-04 NOTE — PROGRESS NOTES
Moe Karimi presents today for   Chief Complaint   Patient presents with    Follow-up     6 month f/u     Chest Pain     Left/center chest tightness on/off    Dizziness     Dizzy spells on/off    ED Follow-up     ED 11/30/23 due to chest pain        Moe Karimi preferred language for health care discussion is english/other. Is someone accompanying this pt? no    Is the patient using any DME equipment during OV? no    Depression Screening:  Depression: Not at risk (12/4/2023)    PHQ-2     PHQ-2 Score: 0        Learning Assessment:  Who is the primary learner? Patient    What is the preferred language for health care of the primary learner? ENGLISH    How does the primary learner prefer to learn new concepts? DEMONSTRATION    Answered By patient    Relationship to Learner SELF           Pt currently taking Anticoagulant therapy? no    Pt currently taking Antiplatelet therapy ? ASA 81 mg 1x daily       Coordination of Care:  1. Have you been to the ER, urgent care clinic since your last visit? Hospitalized since your last visit? no    2. Have you seen or consulted any other health care providers outside of the [address] [place] [address] since your last visit? Include any pap smears or colon screening.  no  n

## 2023-12-08 ENCOUNTER — TELEPHONE (OUTPATIENT)
Age: 88
End: 2023-12-08

## 2023-12-08 NOTE — TELEPHONE ENCOUNTER
Pt's granddaughter called to schedule a hospital f/u for pt discharged from [facility] on 12/2/2023 . He was seen for chest pains and dizziness. He has seen his cardiologist since his hospital stay. Please advise.

## 2023-12-12 NOTE — TELEPHONE ENCOUNTER
Reviewed chart. No compelling reason to follow up since he has seen cardiology. They are gojng to call them for some med clarifications due to cost of one med that they think cardiology was soon to replace. Discussed with granddaughter.

## 2024-01-02 ENCOUNTER — TELEPHONE (OUTPATIENT)
Age: 89
End: 2024-01-02

## 2024-01-02 NOTE — TELEPHONE ENCOUNTER
----- Message from Jose Fraser MD sent at 12/29/2023 10:59 AM EST -----  Please let the patient know that he had no significant arrhythmias on his heart monitor.  ----- Message -----  From: Ninfa Fortune LPN  Sent: 12/28/2023   1:07 PM EST  To: Jose Fraser MD    Per your note-Dizziness.  This may have been due to too many medications.  His blood pressure medications were adjusted downward by the hospitalist.  I agree with decreasing the amlodipine dose and switching carvedilol to very low-dose metoprolol to tartrate 12.5 mg twice daily.  I would like to arrange for a 14-day event monitor once the medications are just to ensure he does not have any significant bradycardic arrhythmias

## 2024-01-13 DIAGNOSIS — Z87.11 HISTORY OF GASTRIC ULCER: ICD-10-CM

## 2024-01-15 RX ORDER — PANTOPRAZOLE SODIUM 40 MG/1
40 TABLET, DELAYED RELEASE ORAL DAILY
Qty: 90 TABLET | Refills: 1 | Status: SHIPPED | OUTPATIENT
Start: 2024-01-15

## 2024-02-24 ENCOUNTER — HOSPITAL ENCOUNTER (EMERGENCY)
Facility: HOSPITAL | Age: 89
Discharge: HOME | End: 2024-02-24
Attending: EMERGENCY MEDICINE
Payer: MEDICARE

## 2024-02-24 VITALS
WEIGHT: 160 LBS | DIASTOLIC BLOOD PRESSURE: 68 MMHG | HEART RATE: 61 BPM | SYSTOLIC BLOOD PRESSURE: 149 MMHG | RESPIRATION RATE: 18 BRPM | BODY MASS INDEX: 23.7 KG/M2 | HEIGHT: 69 IN | TEMPERATURE: 97.7 F | OXYGEN SATURATION: 99 %

## 2024-02-24 DIAGNOSIS — H11.32 SUBCONJUNCTIVAL HEMORRHAGE OF LEFT EYE: Primary | ICD-10-CM

## 2024-02-24 PROCEDURE — 6370000000 HC RX 637 (ALT 250 FOR IP): Performed by: EMERGENCY MEDICINE

## 2024-02-24 PROCEDURE — 2500000003 HC RX 250 WO HCPCS: Performed by: EMERGENCY MEDICINE

## 2024-02-24 PROCEDURE — 99283 EMERGENCY DEPT VISIT LOW MDM: CPT

## 2024-02-24 RX ORDER — PROPARACAINE HYDROCHLORIDE 5 MG/ML
1 SOLUTION/ DROPS OPHTHALMIC
Status: COMPLETED | OUTPATIENT
Start: 2024-02-24 | End: 2024-02-24

## 2024-02-24 RX ADMIN — PROPARACAINE HYDROCHLORIDE 1 DROP: 5 SOLUTION/ DROPS OPHTHALMIC at 07:25

## 2024-02-24 RX ADMIN — FLUORESCEIN SODIUM 1 MG: 1 STRIP OPHTHALMIC at 07:25

## 2024-02-24 ASSESSMENT — VISUAL ACUITY
OD: 20/30
OU: 20/30
OS: 20/30

## 2024-02-24 ASSESSMENT — PAIN - FUNCTIONAL ASSESSMENT: PAIN_FUNCTIONAL_ASSESSMENT: 0-10

## 2024-02-24 ASSESSMENT — PAIN SCALES - GENERAL: PAINLEVEL_OUTOF10: 8

## 2024-02-24 NOTE — ED PROVIDER NOTES
EMERGENCY DEPARTMENT HISTORY AND PHYSICAL EXAM    7:22 AM EST seen at this time in room QA, moved to room 2        Date: 2/24/2024  Patient Name: Alice Silva    History of Presenting Illness     Chief Complaint   Patient presents with    Eye Pain         History Provided By: patient    Additional History (Context): Alice Silva is a 89 y.o. male presents with irritation and pain in the left eye for 3 days visual acuity is unaffected.  Started with a dot on the lateral aspect a red dot on his sclera.  Has worsened.  He does have pain with extraocular movement.    PCP: David Galeana MD    Chief Complaint:   Duration:    Timing:    Location:   Quality:   Severity:   Modifying Factors:   Associated Symptoms:       No current facility-administered medications for this encounter.     Current Outpatient Medications   Medication Sig Dispense Refill    tamsulosin (FLOMAX) 0.4 MG capsule take 1 capsule by mouth once daily 30 capsule 0    pantoprazole (PROTONIX) 40 MG tablet take 1 tablet by mouth once daily 90 tablet 1    amLODIPine (NORVASC) 5 MG tablet Take 1 tablet by mouth daily      metoprolol tartrate (LOPRESSOR) 25 MG tablet Take 0.5 tablets by mouth 2 times daily      rosuvastatin (CRESTOR) 10 MG tablet Take 1 tablet by mouth nightly      finasteride (PROSCAR) 5 MG tablet take 1 tablet by mouth once daily 30 tablet 0    ferrous sulfate (IRON 325) 325 (65 Fe) MG tablet Take 1 tablet by mouth every morning (before breakfast) 90 tablet 1    JARDIANCE 10 MG tablet take 1 tablet by mouth once daily 90 tablet 3    furosemide (LASIX) 40 MG tablet take 1 tablet by mouth once daily 90 tablet 3    RA VITAMIN B-12 TR 1000 MCG TBCR take 1 tablet by mouth daily 90 tablet 3    aspirin 81 MG chewable tablet Take 1 tablet by mouth daily      timolol (TIMOPTIC) 0.5 % ophthalmic solution instill 1 drop into both eyes once daily         Past History     Past Medical History:  Past Medical History:   Diagnosis Date    Arthritis

## 2024-02-24 NOTE — DISCHARGE INSTRUCTIONS
[facility]  [phone]  [address] [facility], [address]    [address] [facility], [address] [phone]  -  Librado Lyn MD  [address] [facility] [address], [place] [address]  Phone: [phone]  -  Papi Mcguire MD  Address: [address] [place] [address]  Phone: [phone]     no

## 2024-02-24 NOTE — ED TRIAGE NOTES
Patient reports left eye pain. Denies injury to eye but reports redness since yesterday. Visual acuity is 20/30- bilateral eyes, 20/30 -left eye, and 20/30 right eye.

## 2024-02-26 NOTE — PROGRESS NOTES
Alice Silva presents today for a 3 month check-up.  He was seen by Dr. Fraser in Dec. 2023.  He states that he has been feeling well and denies chest pain, palpitations, syncope, shortness of breath.  He brought medication bottles with him  today and states that \"this is all I am taking.\"  When compared to the medication list that he brought and the list in his chart, several medications are missing (metoprolol tartrate, rosuvastatin, finasteride, and tamsulosin).  He states that the only medications he has is what he brought with him today.  According to documentation in his chart, he has \"stopped\" taking medications in the past without being instructed to do so.     He is a 89 year old male who was initially seen in 2020 for chest pain and echo and stress test were ordered but he no showed for the testing. In Dec. 2022, he presented to the ER with shortness of breath and diagnosed with CHF based on elevated NT proBNP level, and abnormal imaging which showed bilateral pleural effusions on his CT scan with pulmonary edema present.  He was given a dosage of IV furosemide and sent home with oral furosemide 40 mg daily.  He had an echocardiogram done in January 2023 which showed preserved LV function, EF 60-65%, moderate concentric LVH, diastolic dysfunction, with mild mitral and tricuspid regurgitation.    He was hospitalized in Dec. 2023 for chest pain and dizziness.  In the ER, he was found to have a relatively normal EKG, serial high sensitive troponin levels were minimally elevated but flat, so he underwent noninvasive cardiac testing which included an echocardiogram and a pharmacologic nuclear stress test.  His nuclear stress test was normal and low risk.  His echocardiogram showed preserved LVEF of 50%, mild left atrial large with no valvular heart disease.  He did have an elevated NT proBNP level at 4800.  His blood pressure medications were lowered presumed due to borderline low blood pressure.

## 2024-03-04 ENCOUNTER — OFFICE VISIT (OUTPATIENT)
Age: 89
End: 2024-03-04
Payer: MEDICARE

## 2024-03-04 VITALS
BODY MASS INDEX: 23.4 KG/M2 | DIASTOLIC BLOOD PRESSURE: 68 MMHG | OXYGEN SATURATION: 98 % | HEART RATE: 66 BPM | WEIGHT: 158 LBS | SYSTOLIC BLOOD PRESSURE: 132 MMHG | HEIGHT: 69 IN

## 2024-03-04 DIAGNOSIS — N18.30 STAGE 3 CHRONIC KIDNEY DISEASE, UNSPECIFIED WHETHER STAGE 3A OR 3B CKD (HCC): ICD-10-CM

## 2024-03-04 DIAGNOSIS — E78.5 HYPERLIPIDEMIA, UNSPECIFIED HYPERLIPIDEMIA TYPE: ICD-10-CM

## 2024-03-04 DIAGNOSIS — I50.32 CHRONIC HEART FAILURE WITH PRESERVED EJECTION FRACTION (HCC): ICD-10-CM

## 2024-03-04 DIAGNOSIS — I10 ESSENTIAL (PRIMARY) HYPERTENSION: Primary | ICD-10-CM

## 2024-03-04 PROCEDURE — 99214 OFFICE O/P EST MOD 30 MIN: CPT | Performed by: NURSE PRACTITIONER

## 2024-03-04 PROCEDURE — 1123F ACP DISCUSS/DSCN MKR DOCD: CPT | Performed by: NURSE PRACTITIONER

## 2024-03-04 ASSESSMENT — ENCOUNTER SYMPTOMS
DIARRHEA: 0
CONSTIPATION: 0
VOMITING: 0
WHEEZING: 0
BLOOD IN STOOL: 0
COUGH: 0
CHEST TIGHTNESS: 0
ABDOMINAL DISTENTION: 0
NAUSEA: 0
SHORTNESS OF BREATH: 0

## 2024-03-04 ASSESSMENT — PATIENT HEALTH QUESTIONNAIRE - PHQ9
1. LITTLE INTEREST OR PLEASURE IN DOING THINGS: 0
SUM OF ALL RESPONSES TO PHQ QUESTIONS 1-9: 0
2. FEELING DOWN, DEPRESSED OR HOPELESS: 0
SUM OF ALL RESPONSES TO PHQ QUESTIONS 1-9: 0
SUM OF ALL RESPONSES TO PHQ QUESTIONS 1-9: 0
SUM OF ALL RESPONSES TO PHQ9 QUESTIONS 1 & 2: 0
SUM OF ALL RESPONSES TO PHQ QUESTIONS 1-9: 0

## 2024-03-04 NOTE — PATIENT INSTRUCTIONS
Restart metoprolol tartrate 12.5mg twice a day.  Take 1/2 of a 25mg tablet twice a day  All other medications to remain the same  Please check with Dr. Galeana regarding Flomax, Proscar, Crestor  Follow-up with Dr. Fraser as scheduled and as needed

## 2024-03-04 NOTE — PROGRESS NOTES
Alice Silva presents today for   Chief Complaint   Patient presents with    Follow-up     3 months    Dizziness       Alice Silva preferred language for health care discussion is english/other.    Is someone accompanying this pt? no    Is the patient using any DME equipment during OV? no    Depression Screening:  Depression: Not at risk (3/4/2024)    PHQ-2     PHQ-2 Score: 0        Learning Assessment:  Who is the primary learner? Patient    What is the preferred language for health care of the primary learner? ENGLISH    How does the primary learner prefer to learn new concepts? DEMONSTRATION    Answered By patient    Relationship to Learner SELF           Pt currently taking Anticoagulant therapy? no    Pt currently taking Antiplatelet therapy ? aspirin      Coordination of Care:  1. Have you been to the ER, urgent care clinic since your last visit? Hospitalized since your last visit? no    2. Have you seen or consulted any other health care providers outside of the [facility] since your last visit? Include any pap smears or colon screening. no

## 2024-04-30 ENCOUNTER — HOSPITAL ENCOUNTER (OUTPATIENT)
Facility: HOSPITAL | Age: 89
Discharge: HOME | End: 2024-05-03

## 2024-04-30 LAB
A/G RATIO: 1.4 RATIO (ref 1.1–2.6)
ALBUMIN: 4 G/DL (ref 3.5–5)
ALP BLD-CCNC: 69 U/L (ref 40–125)
ALT SERPL-CCNC: 12 U/L (ref 5–40)
ANION GAP SERPL CALCULATED.3IONS-SCNC: 11 MMOL/L (ref 3–15)
AST SERPL-CCNC: 18 U/L (ref 10–37)
BILIRUB SERPL-MCNC: 0.3 MG/DL (ref 0.2–1.2)
BUN BLDV-MCNC: 23 MG/DL (ref 6–22)
CALCIUM SERPL-MCNC: 9 MG/DL (ref 8.4–10.5)
CHLORIDE BLD-SCNC: 101 MMOL/L (ref 98–110)
CHOLESTEROL, TOTAL: 177 MG/DL (ref 110–200)
CHOLESTEROL/HDL RATIO: 1.9 (ref 0–5)
CO2: 28 MMOL/L (ref 20–32)
CREAT SERPL-MCNC: 1.5 MG/DL (ref 0.8–1.6)
ESTIMATED AVERAGE GLUCOSE: 116 MG/DL (ref 91–123)
FERRITIN: 332 NG/ML (ref 22–322)
GFR, ESTIMATED: 42.9 ML/MIN/1.73 SQ.M.
GLOBULIN: 2.9 G/DL (ref 2–4)
GLUCOSE: 84 MG/DL (ref 70–99)
HBA1C MFR BLD: 5.7 % (ref 4.8–5.6)
HCT VFR BLD CALC: 42.9 % (ref 37.8–52.2)
HDLC SERPL-MCNC: 91 MG/DL
HEMOGLOBIN: 13.8 G/DL (ref 12.6–17.1)
IRON: 106 MCG/DL (ref 45–160)
LDL CHOLESTEROL: 68 MG/DL (ref 50–99)
LDL/HDL RATIO: 0.8
MCH RBC QN AUTO: 32 PG (ref 26–34)
MCHC RBC AUTO-ENTMCNC: 32 G/DL (ref 31–36)
MCV RBC AUTO: 101 FL (ref 80–95)
NON-HDL CHOLESTEROL: 86 MG/DL
PDW BLD-RTO: 12.6 % (ref 10–15.5)
PLATELET # BLD: 187 K/UL (ref 140–440)
PMV BLD AUTO: 12.6 FL (ref 9–13)
POTASSIUM SERPL-SCNC: 4.2 MMOL/L (ref 3.5–5.5)
RBC # BLD: 4.26 M/UL (ref 3.8–5.8)
SENTARA SPECIMEN COLLECTION: NORMAL
SODIUM BLD-SCNC: 140 MMOL/L (ref 133–145)
TOTAL PROTEIN: 6.9 G/DL (ref 6.2–8.1)
TRIGL SERPL-MCNC: 86 MG/DL (ref 40–149)
VITAMIN B-12: 303 PG/ML (ref 211–911)
VLDLC SERPL CALC-MCNC: 17 MG/DL (ref 8–30)
WBC # BLD: 5.2 K/UL (ref 4–11)

## 2024-04-30 PROCEDURE — 99001 SPECIMEN HANDLING PT-LAB: CPT

## 2024-06-06 ENCOUNTER — OFFICE VISIT (OUTPATIENT)
Facility: CLINIC | Age: 89
End: 2024-06-06

## 2024-06-06 ENCOUNTER — OFFICE VISIT (OUTPATIENT)
Facility: CLINIC | Age: 89
End: 2024-06-06
Payer: MEDICARE

## 2024-06-06 VITALS
WEIGHT: 155 LBS | OXYGEN SATURATION: 100 % | HEIGHT: 69 IN | TEMPERATURE: 97.8 F | HEART RATE: 61 BPM | SYSTOLIC BLOOD PRESSURE: 144 MMHG | RESPIRATION RATE: 16 BRPM | BODY MASS INDEX: 22.96 KG/M2 | DIASTOLIC BLOOD PRESSURE: 68 MMHG

## 2024-06-06 VITALS
BODY MASS INDEX: 22.96 KG/M2 | HEART RATE: 61 BPM | SYSTOLIC BLOOD PRESSURE: 144 MMHG | DIASTOLIC BLOOD PRESSURE: 68 MMHG | TEMPERATURE: 97.8 F | RESPIRATION RATE: 16 BRPM | WEIGHT: 155 LBS | HEIGHT: 69 IN | OXYGEN SATURATION: 100 %

## 2024-06-06 DIAGNOSIS — R73.01 IMPAIRED FASTING GLUCOSE: ICD-10-CM

## 2024-06-06 DIAGNOSIS — I50.9 CHRONIC CONGESTIVE HEART FAILURE, UNSPECIFIED HEART FAILURE TYPE (HCC): ICD-10-CM

## 2024-06-06 DIAGNOSIS — M48.061 SPINAL STENOSIS, LUMBAR REGION WITHOUT NEUROGENIC CLAUDICATION: ICD-10-CM

## 2024-06-06 DIAGNOSIS — N18.32 STAGE 3B CHRONIC KIDNEY DISEASE (HCC): ICD-10-CM

## 2024-06-06 DIAGNOSIS — D50.9 IRON DEFICIENCY ANEMIA, UNSPECIFIED IRON DEFICIENCY ANEMIA TYPE: ICD-10-CM

## 2024-06-06 DIAGNOSIS — I10 ESSENTIAL (PRIMARY) HYPERTENSION: Primary | ICD-10-CM

## 2024-06-06 DIAGNOSIS — N40.1 BENIGN PROSTATIC HYPERPLASIA WITH LOWER URINARY TRACT SYMPTOMS, SYMPTOM DETAILS UNSPECIFIED: ICD-10-CM

## 2024-06-06 DIAGNOSIS — Z87.11 HISTORY OF GASTRIC ULCER: ICD-10-CM

## 2024-06-06 DIAGNOSIS — Z71.89 ACP (ADVANCE CARE PLANNING): ICD-10-CM

## 2024-06-06 DIAGNOSIS — E53.8 DEFICIENCY OF OTHER SPECIFIED B GROUP VITAMINS: ICD-10-CM

## 2024-06-06 DIAGNOSIS — Z00.00 MEDICARE ANNUAL WELLNESS VISIT, SUBSEQUENT: Primary | ICD-10-CM

## 2024-06-06 PROCEDURE — G0439 PPPS, SUBSEQ VISIT: HCPCS | Performed by: FAMILY MEDICINE

## 2024-06-06 PROCEDURE — 99497 ADVNCD CARE PLAN 30 MIN: CPT | Performed by: FAMILY MEDICINE

## 2024-06-06 PROCEDURE — 1123F ACP DISCUSS/DSCN MKR DOCD: CPT | Performed by: FAMILY MEDICINE

## 2024-06-06 RX ORDER — ROSUVASTATIN CALCIUM 10 MG/1
10 TABLET, COATED ORAL
Qty: 90 TABLET | Refills: 3 | Status: SHIPPED | OUTPATIENT
Start: 2024-06-06

## 2024-06-06 RX ORDER — AMLODIPINE BESYLATE 5 MG/1
5 TABLET ORAL DAILY
COMMUNITY
End: 2024-06-06 | Stop reason: SDUPTHER

## 2024-06-06 RX ORDER — FUROSEMIDE 40 MG/1
40 TABLET ORAL DAILY
Qty: 90 TABLET | Refills: 1 | Status: SHIPPED | OUTPATIENT
Start: 2024-06-06

## 2024-06-06 RX ORDER — FERROUS SULFATE 325(65) MG
1 TABLET ORAL
Qty: 90 TABLET | Refills: 1 | Status: SHIPPED | OUTPATIENT
Start: 2024-06-06

## 2024-06-06 RX ORDER — PANTOPRAZOLE SODIUM 40 MG/1
40 TABLET, DELAYED RELEASE ORAL DAILY
Qty: 90 TABLET | Refills: 1 | Status: SHIPPED | OUTPATIENT
Start: 2024-06-06

## 2024-06-06 RX ORDER — AMLODIPINE BESYLATE 5 MG/1
5 TABLET ORAL DAILY
Qty: 90 TABLET | Refills: 1 | Status: SHIPPED | OUTPATIENT
Start: 2024-06-06

## 2024-06-06 RX ORDER — PSYLLIUM HUSK 3.4 G/7G
1 POWDER ORAL DAILY
Qty: 90 TABLET | Refills: 3 | Status: SHIPPED | OUTPATIENT
Start: 2024-06-06

## 2024-06-06 SDOH — ECONOMIC STABILITY: FOOD INSECURITY: WITHIN THE PAST 12 MONTHS, YOU WORRIED THAT YOUR FOOD WOULD RUN OUT BEFORE YOU GOT MONEY TO BUY MORE.: NEVER TRUE

## 2024-06-06 SDOH — ECONOMIC STABILITY: INCOME INSECURITY: HOW HARD IS IT FOR YOU TO PAY FOR THE VERY BASICS LIKE FOOD, HOUSING, MEDICAL CARE, AND HEATING?: NOT VERY HARD

## 2024-06-06 SDOH — ECONOMIC STABILITY: FOOD INSECURITY: WITHIN THE PAST 12 MONTHS, THE FOOD YOU BOUGHT JUST DIDN'T LAST AND YOU DIDN'T HAVE MONEY TO GET MORE.: NEVER TRUE

## 2024-06-06 ASSESSMENT — LIFESTYLE VARIABLES
HOW OFTEN DURING THE LAST YEAR HAVE YOU FAILED TO DO WHAT WAS NORMALLY EXPECTED FROM YOU BECAUSE OF DRINKING: NEVER
HOW OFTEN DURING THE LAST YEAR HAVE YOU NEEDED AN ALCOHOLIC DRINK FIRST THING IN THE MORNING TO GET YOURSELF GOING AFTER A NIGHT OF HEAVY DRINKING: NEVER
HOW OFTEN DURING THE LAST YEAR HAVE YOU FOUND THAT YOU WERE NOT ABLE TO STOP DRINKING ONCE YOU HAD STARTED: NEVER
HOW OFTEN DO YOU HAVE A DRINK CONTAINING ALCOHOL: MONTHLY OR LESS
HAVE YOU OR SOMEONE ELSE BEEN INJURED AS A RESULT OF YOUR DRINKING: NO
HAS A RELATIVE, FRIEND, DOCTOR, OR ANOTHER HEALTH PROFESSIONAL EXPRESSED CONCERN ABOUT YOUR DRINKING OR SUGGESTED YOU CUT DOWN: NO
HOW OFTEN DURING THE LAST YEAR HAVE YOU HAD A FEELING OF GUILT OR REMORSE AFTER DRINKING: NEVER
HOW MANY STANDARD DRINKS CONTAINING ALCOHOL DO YOU HAVE ON A TYPICAL DAY: 10 OR MORE
HOW OFTEN DURING THE LAST YEAR HAVE YOU BEEN UNABLE TO REMEMBER WHAT HAPPENED THE NIGHT BEFORE BECAUSE YOU HAD BEEN DRINKING: NEVER

## 2024-06-06 ASSESSMENT — PATIENT HEALTH QUESTIONNAIRE - PHQ9
1. LITTLE INTEREST OR PLEASURE IN DOING THINGS: NOT AT ALL
SUM OF ALL RESPONSES TO PHQ QUESTIONS 1-9: 0
SUM OF ALL RESPONSES TO PHQ QUESTIONS 1-9: 0
SUM OF ALL RESPONSES TO PHQ9 QUESTIONS 1 & 2: 0
SUM OF ALL RESPONSES TO PHQ QUESTIONS 1-9: 0
SUM OF ALL RESPONSES TO PHQ QUESTIONS 1-9: 0
2. FEELING DOWN, DEPRESSED OR HOPELESS: NOT AT ALL

## 2024-06-06 NOTE — PROGRESS NOTES
Chief Complaint   Patient presents with    Results     Review of results     Anemia    Back Pain     C/O lower back pain     Benign Prostatic Hypertrophy    Chronic Kidney Disease    Congestive Heart Failure    Hypertension    IFG    Vitamin B-12 Def      \"Have you been to the ER, urgent care clinic since your last visit?  Hospitalized since your last visit?\"    YES - When: approximately 3 months ago.  Where and Why: 02/24/2024 [facility] for subconjunctival hemorrhage.    “Have you seen or consulted any other health care providers outside of [facility] since your last visit?”    NO

## 2024-06-06 NOTE — PATIENT INSTRUCTIONS

## 2024-06-06 NOTE — ACP (ADVANCE CARE PLANNING)
Advance Care Planning      Advance Care Planning (ACP) Physician/NP/PA (Provider) Conversation        Date of ACP Conversation: 6/6/2024    Conversation Conducted with:   Patient with Decision Making Capacity    Health Care Decision Maker:      Primary Decision Maker: AndradeJesus Alberto - Grandchild - [phone]    Secondary Decision Maker: Kaleigh Hartman - Other - [phone]      Care Preferences:    Patient's ACP is on file and is reviewed in depth with him.    Conversation Outcomes / Follow-Up Plan:   Review annually.   No changes endorsed at this time      Length of Voluntary ACP Conversation in minutes:  16 minutes      David Galeana MD

## 2024-06-06 NOTE — PATIENT INSTRUCTIONS
[email or web address].[email or web address] and enter F944 to learn more about \"Learning About Dental Care for Older Adults.\"  Current as of: August 6, 2023  Content Version: 14.1  © 2006-2024 [employer].   Care instructions adapted under license by [employer]. If you have questions about a medical condition or this instruction, always ask your healthcare professional. [employer] disclaims any warranty or liability for your use of this information.           Learning About Vision Tests  What are vision tests?     The four most common vision tests are visual acuity tests, refraction, visual field tests, and color vision tests.  Visual acuity (sharpness) tests  These tests are used:  To see if you need glasses or contact lenses.  To monitor an eye problem.  To check an eye injury.  Visual acuity tests are done as part of routine exams. You may also have this test when you get your 's license or apply for some types of jobs.  Visual field tests  These tests are used:  To check for vision loss in any area of your range of vision.  To screen for certain eye diseases.  To look for nerve damage after a stroke, head injury, or other problem that could reduce blood flow to the brain.  Refraction and color tests  A refraction test is done to find the right prescription for glasses and contact lenses.  A color vision test is done to check for color blindness.  Color vision is often tested as part of a routine exam. You may also have this test when you apply for a job where recognizing different colors is important, such as , electronics, or the .  How are vision tests done?  Visual acuity test   You cover one eye at a time.  You read aloud from a wall chart across the room.  You read aloud from a small card that you hold in your hand.  Refraction   You look into a special device.  The device puts lenses of different strengths in front of each eye to see how strong your glasses

## 2024-06-17 NOTE — PATIENT INSTRUCTIONS
A Healthy Lifestyle: Care Instructions Your Care Instructions A healthy lifestyle can help you feel good, stay at a healthy weight, and have plenty of energy for both work and play. A healthy lifestyle is something you can share with your whole family. A healthy lifestyle also can lower your risk for serious health problems, such as high blood pressure, heart disease, and diabetes. You can follow a few steps listed below to improve your health and the health of your family. Follow-up care is a key part of your treatment and safety. Be sure to make and go to all appointments, and call your doctor if you are having problems. It's also a good idea to know your test results and keep a list of the medicines you take. How can you care for yourself at home? · Do not eat too much sugar, fat, or fast foods. You can still have dessert and treats now and then. The goal is moderation. · Start small to improve your eating habits. Pay attention to portion sizes, drink less juice and soda pop, and eat more fruits and vegetables. ? Eat a healthy amount of food. A 3-ounce serving of meat, for example, is about the size of a deck of cards. Fill the rest of your plate with vegetables and whole grains. ? Limit the amount of soda and sports drinks you have every day. Drink more water when you are thirsty. ? Eat at least 5 servings of fruits and vegetables every day. It may seem like a lot, but it is not hard to reach this goal. A serving or helping is 1 piece of fruit, 1 cup of vegetables, or 2 cups of leafy, raw vegetables. Have an apple or some carrot sticks as an afternoon snack instead of a candy bar. Try to have fruits and/or vegetables at every meal. 
· Make exercise part of your daily routine. You may want to start with simple activities, such as walking, bicycling, or slow swimming. Try to be active 30 to 60 minutes every day.  You do not need to do all 30 to 60 [facility]   Physical Therapy Treatment Note      Name: Shelby Laurent  [facility] Number: [id number]    Therapy Diagnosis:   Encounter Diagnoses   Name Primary?    Decreased range of motion of left shoulder Yes    Decreased range of motion of neck     Weakness of left shoulder     Impaired mobility and activities of daily living        Physician: Eleuterio Hall II, MD    Visit Date: 6/17/2024  Physician Orders: PT Eval and Treat   Medical Diagnosis from Referral:   Z96.612 (ICD-10-CM) - Status post reverse total shoulder replacement, left   Evaluation Date: 6/3/2024  Authorization Period Expiration: 12/31/24  Plan of Care Expiration: 9/3/24  Progress Note Due: 7/3/24  Visit # / Visits authorized: 1/ 1 4/20  FOTO: 3/5     Precautions: Standard and Fall and Below  Phase I Precautions:  · Sling is worn for 3-4 weeks postoperatively and only removed for exercise and bathing once arina. The use of a sling often may be extended for a total of 6 weeks  · While lying supine, the distal humerus / elbow should be supported by a pillow or towel roll to avoid shoulder extension. Patients should be advised to always be able to visualize their elbow while lying supine.  · No shoulder AROM  · No lifting of objects with operative extremity  · No supporting of body weight with involved extremity  · Keep incision clean and dry (no soaking/wetting for 2 weeks); no whirlpool, jacuzzi, ocean/lake wading for 4 weeks.     Weeks 3 - 6:  · Progress exercises listed above.  · Initiate PROM:  o Forward flexion and elevation in the scapular plane. Goal to 120°.  o ER in scapular plane to tolerance, respecting soft tissue constraints.  · No IR ROM  · Gentle resisted exercises of elbow, wrist, and hand.  · Continue frequent cryotherapy     Date of Procedure: 4/26/2024   Time Since Procedure: 7 Weeks and 3 Days (6/17/24)      Procedure: Procedure(s) (LRB):  ARTHROPLASTY, SHOULDER, TOTAL, REVERSE (Left)  Pre-Operative  Diagnosis: Closed 4-part fracture of proximal humerus, left, initial encounter [S42.292A]  Preop testing [Z01.818]  Post-Operative Diagnosis: Post-Op Diagnosis Codes:     * Closed 4-part fracture of proximal humerus, left, initial encounter [S42.292A]     * Preop testing [Z01.818]    PTA Visit #: 0/5       Time In: 0957 am   Time Out: 1055 am   Total Billable Time: 58 minutes (One on One 58 minutes)     Subjective     Pt reports: She is doing well but she experienced some soreness in her shoulder blades over the weekend. She still has some tenderness around her incision.      She was compliant with home exercise program.  Response to previous treatment: Decreased Irritation   Functional change: No Change     Pain: 4/10  Location:  Left Superior/Anterior Shoulder      Objective      Objective Measures updated at progress report unless specified.     Treatment     Brayden received the treatments listed below:      therapeutic exercises to develop strength, endurance, ROM, posture, and core stabilization for 10 minutes including:    Supine Flexion with T bar x 20 Pain Free Range  Scapular Retractions 5 sec hold x 20  Shoulder Shrugs 5 sec hold x 20    manual therapy techniques: The techniques below were applied for 13 minutes:     Cervical Side Glides Bilaterally Grades II-III  Passive Flexion and External Rotation   Scapular Mobilization 4 Ways and Combined Motions Grade III    Not Today:   Passive Flexion with Scapular Mobilization     neuromuscular re-education activities to improve:  for 24 minutes. The following activities were included:    Side Lying Active Assisted Range of Motion Serratus Punch 4 Minutes   Scapular Isometric Holds 4 Ways 10 x 10 Sec  ER Walk Outs RTB 2 x 12 5 sec hold   GH Isometrics Extension RTB walkout 10 sec x 15 reps   GH Isometrics Flexion RTB walkout 10 sec x 15 reps   GH Isometrics Abduction with Towel Against Wall 10 sec x 10 reps      Not Today:    Table Walk Backs 1 x 25 2 Sec Holds   minutes all at once. For example, you can exercise 3 times a day for 10 or 20 minutes. Moderate exercise is safe for most people, but it is always a good idea to talk to your doctor before starting an exercise program. 
· Keep moving. Jessica Hunger the lawn, work in the garden, or [employer]. Take the stairs instead of the elevator at work. · If you smoke, quit. People who smoke have an increased risk for heart attack, stroke, cancer, and other lung illnesses. Quitting is hard, but there are ways to boost your chance of quitting tobacco for good. ? Use nicotine gum, patches, or lozenges. ? Ask your doctor about stop-smoking programs and medicines. ? Keep trying. In addition to reducing your risk of diseases in the future, you will notice some benefits soon after you stop using tobacco. If you have shortness of breath or asthma symptoms, they will likely get better within a few weeks after you quit. · Limit how much alcohol you drink. Moderate amounts of alcohol (up to 2 drinks a day for men, 1 drink a day for women) are okay. But drinking too much can lead to liver problems, high blood pressure, and other health problems. Family health If you have a family, there are many things you can do together to improve your health. · Eat meals together as a family as often as possible. · Eat healthy foods. This includes fruits, vegetables, lean meats and dairy, and whole grains. · Include your family in your fitness plan. Most people think of activities such as jogging or tennis as the way to fitness, but there are many ways you and your family can be more active. Anything that makes you breathe hard and gets your heart pumping is exercise. Here are some tips: 
? Walk to do errands or to take your child to school or the bus. 
? Go for a family bike ride after dinner instead of watching TV. Where can you learn more? Go to http://em-gus.info/.   Shoulder Extension Rows RTB 3 x 8  Low Extension Press Downs 10 x 10 Sec Holds  Tricep Extension RTB 3 x 10   Radial Nerve Glides 1 x 30       therapeutic activities to improve functional performance for 10 minutes, including:    Pulleys Flexion 4 Minutes and Scaption 4 Minutes  Side Lying Active Assisted Range of Motion Flexion with Dowel 4 Minutes     hot pack for 0 minutes to .    cold pack for 0 minutes to .    Patient Education and Home Exercises       Education provided:   - HEP   - Plan of Care  - Post-Op Precautions and Progressions   - Explanation of Findings    Written Home Exercises Provided: Patient instructed to cont prior HEP. Exercises were reviewed and Brayden was able to demonstrate them prior to the end of the session.  Brayden demonstrated good  understanding of the education provided. See EMR under Patient Instructions for exercises provided during therapy sessions    Assessment     Brayden presented to physical therapy with soreness in her shoulder blades and continued reports of stiffness. Upon assessment, PROM of shoulder flexion could get to approximately 90 degrees with no pain. Scapular mobilizations and isometrics were performed and she had no increases in pain. Continued intervention focus of periscapular strengthening, shoulder musculature activation with isometrics and increasing shoulder range of motion. Brayden tolerated exercise regimen with no issues or increases in pain. Will continue to progress in accordance with surgical protocol and patient tolerance.     Brayden Is progressing well towards her goals.   Pt prognosis is Good.     Pt will continue to benefit from skilled outpatient physical therapy to address the deficits listed in the problem list box on initial evaluation, provide pt/family education and to maximize pt's level of independence in the home and community environment.     Pt's spiritual, cultural and educational needs considered and pt agreeable to plan of care and goals.    Enter I177 in the search box to learn more about \"A Healthy Lifestyle: Care Instructions. \" Current as of: September 11, 2018 Content Version: 11.9 © 1143-9482 [employer]. Care instructions adapted under license by [employer] (which disclaims liability or warranty for this information). If you have questions about a medical condition or this instruction, always ask your healthcare professional. [place] [address] any warranty or liability for your use of this information. Follow up in 6 months for annual medicare wellness/routine care and please have 10 hour fasting on Friday 02-   Anticipated barriers to physical therapy: Myofascial Pain Syndrome     Goals:   Short Term Goals (6 Weeks):   1. Pt will be independent with HEP to supplement PT in improving functional use of R UE.  2. Pt will increase pain free left shoulder elevation AROM to >/= 90 deg to improve functional mobility of UE  3. Pt will increase shoulder ER PROM in 90 deg abduction to >/= 55 deg to improve functional mobility of UE  4. Pt will improve left shoulder MMTs by 1/2 grade to promote equal use of B UEs in performing functional tasks.  Long Term Goals (12 Weeks):   1. Pt will improve FOTO score to </=58% limited to decrease perceived limitation with carrying, moving, and handling objects.  2. Pt will increase left shoulder AROM to WFL in all planes to improve functional use of R RUE.  3. Pt will improve left shoulder MMTs by 1 grade to promote equal use of B UEs in performing functional tasks.  5. Pt will lift 10 lb objects without pain to promote functional QOL.  6. Pt to report pain </= 0/10 with ADLs and IADLs using left UE to improve functional QOL.   Plan      Plan of care Certification: 6/3/2024 to 9/3/24.     Outpatient Physical Therapy 2 times weekly for 12 weeks to include the following interventions: Cervical/Lumbar Traction, Electrical Stimulation , Manual Therapy, Moist Heat/ Ice, Neuromuscular Re-ed, Orthotic Management and Training, Patient Education, Self Care, Therapeutic Activities, Therapeutic Exercise, and Ultrasound.    Monik Genao, SPT    I certify that I was present in the room directing the student in service delivery and guiding them using my skilled judgment. As the co-signing therapist I have reviewed the students documentation and am responsible for the treatment, assessment, and plan.      Keron Acosta PT, DPT

## 2024-07-10 DIAGNOSIS — D50.9 IRON DEFICIENCY ANEMIA, UNSPECIFIED IRON DEFICIENCY ANEMIA TYPE: ICD-10-CM

## 2024-07-10 RX ORDER — FERROUS SULFATE 325(65) MG
1 TABLET ORAL
Qty: 90 TABLET | Refills: 1 | Status: SHIPPED | OUTPATIENT
Start: 2024-07-10

## 2024-07-16 DIAGNOSIS — Z87.11 HISTORY OF GASTRIC ULCER: ICD-10-CM

## 2024-07-17 RX ORDER — PANTOPRAZOLE SODIUM 40 MG/1
40 TABLET, DELAYED RELEASE ORAL DAILY
Qty: 90 TABLET | Refills: 1 | Status: SHIPPED | OUTPATIENT
Start: 2024-07-17

## 2024-07-23 ENCOUNTER — OFFICE VISIT (OUTPATIENT)
Age: 89
End: 2024-07-23

## 2024-07-23 VITALS
DIASTOLIC BLOOD PRESSURE: 60 MMHG | BODY MASS INDEX: 22.51 KG/M2 | SYSTOLIC BLOOD PRESSURE: 130 MMHG | HEIGHT: 69 IN | HEART RATE: 63 BPM | OXYGEN SATURATION: 99 % | WEIGHT: 152 LBS

## 2024-07-23 DIAGNOSIS — E78.5 DYSLIPIDEMIA: ICD-10-CM

## 2024-07-23 DIAGNOSIS — N18.30 STAGE 3 CHRONIC KIDNEY DISEASE, UNSPECIFIED WHETHER STAGE 3A OR 3B CKD (HCC): ICD-10-CM

## 2024-07-23 DIAGNOSIS — I10 HYPERTENSION, UNSPECIFIED TYPE: ICD-10-CM

## 2024-07-23 DIAGNOSIS — I50.32 CHRONIC HEART FAILURE WITH PRESERVED EJECTION FRACTION (HCC): Primary | ICD-10-CM

## 2024-07-23 PROBLEM — R07.9 CHEST PAIN: Status: ACTIVE | Noted: 2023-11-30

## 2024-07-23 PROBLEM — N40.0 BPH (BENIGN PROSTATIC HYPERPLASIA): Status: ACTIVE | Noted: 2023-11-30

## 2024-07-23 PROBLEM — N18.32 STAGE 3B CHRONIC KIDNEY DISEASE (HCC): Status: ACTIVE | Noted: 2023-12-01

## 2024-07-23 PROBLEM — I50.9 CHRONIC CONGESTIVE HEART FAILURE (HCC): Status: ACTIVE | Noted: 2023-12-01

## 2024-07-23 PROBLEM — D50.9 IRON DEFICIENCY ANEMIA: Status: ACTIVE | Noted: 2023-12-01

## 2024-07-23 PROBLEM — K27.9 PEPTIC ULCER DISEASE: Status: ACTIVE | Noted: 2023-12-01

## 2024-07-23 PROBLEM — R93.5 ABNORMAL ABDOMINAL CT SCAN: Status: ACTIVE | Noted: 2023-12-01

## 2024-07-23 RX ORDER — AMLODIPINE BESYLATE 5 MG/1
5 TABLET ORAL DAILY
Qty: 90 TABLET | Refills: 3 | Status: SHIPPED | OUTPATIENT
Start: 2024-07-23

## 2024-07-23 ASSESSMENT — ENCOUNTER SYMPTOMS
ABDOMINAL DISTENTION: 0
NAUSEA: 0
VOMITING: 0
SHORTNESS OF BREATH: 0
ABDOMINAL PAIN: 0
COUGH: 0
SORE THROAT: 0

## 2024-07-23 NOTE — PROGRESS NOTES
Alice Silva presents today for   Chief Complaint   Patient presents with    Follow-up     6 month       Alice Silva preferred language for health care discussion is english/other.    Is someone accompanying this pt? no    Is the patient using any DME equipment during OV? no    Depression Screening:  Depression: Not at risk (7/23/2024)    PHQ-2     PHQ-2 Score: 0        Learning Assessment:  Who is the primary learner? Patient    What is the preferred language for health care of the primary learner? ENGLISH    How does the primary learner prefer to learn new concepts? DEMONSTRATION    Answered By patient    Relationship to Learner SELF           Pt currently taking Anticoagulant therapy? no    Pt currently taking Antiplatelet therapy ? Aspirin 81mg daily      Coordination of Care:  1. Have you been to the ER, urgent care clinic since your last visit? Hospitalized since your last visit? no    2. Have you seen or consulted any other health care providers outside of the [facility] since your last visit? Include any pap smears or colon screening. no

## 2024-07-23 NOTE — PROGRESS NOTES
07/23/24     Alice Silva  is a 89 y.o. male     Chief Complaint   Patient presents with    Follow-up     6 month       HPI    Patient presents for a follow-up office visit.  He was initially referred here from the emergency room for evaluation of chest pain in 2020.     He was seen in the emergency room in December 2022 for worsening shortness of breath.  He was diagnosed with decompensated heart failure based on elevated NT proBNP level, and abnormal imaging which showed bilateral pleural effusions on his CT scan with pulmonary edema present.  He was given a dosage of IV furosemide and sent home with oral furosemide 40 mg daily..  He was instructed to continue his furosemide and also was started on Jardiance 10 mg daily.  An echocardiogram was completed in January 2023 which showed preserved LV function, EF 60-65%, moderate concentric LVH, diastolic dysfunction, with mild mitral and tricuspid regurgitation.    He was hospitalized in December 2023 at [facility].  He was found to have a relatively normal EKG, serial high sensitive troponin levels were minimally elevated but flat, so he underwent noninvasive cardiac testing including an echocardiogram and a pharmacologic nuclear stress test.  His nuclear stress test was normal and low risk.  His echocardiogram showed preserved LVEF of 50%, mild left atrial enlargement with no valvular heart disease.  He did have an elevated NT proBNP level at 4800.  His blood pressure medications were lowered presumed due to borderline low blood pressure.     He was last seen in our office by our nurse practitioner in March 2024.  His metoprolol was restarted at that time.  He also wore a Holter monitor in December 2023 which showed no significant arrhythmias, average heart rate 72 bpm.    He was last seen by me 7 months ago.  Since last visit he is back to working at [employer] 40 days a week.  He has not noted any new chest pain, shortness of breath, orthopnea, or PND.  He does have some

## 2024-07-30 ENCOUNTER — HOSPITAL ENCOUNTER (EMERGENCY)
Facility: HOSPITAL | Age: 89
Discharge: HOME | End: 2024-07-30
Payer: MEDICARE

## 2024-07-30 ENCOUNTER — APPOINTMENT (OUTPATIENT)
Facility: HOSPITAL | Age: 89
End: 2024-07-30
Payer: MEDICARE

## 2024-07-30 VITALS
RESPIRATION RATE: 18 BRPM | HEART RATE: 68 BPM | WEIGHT: 165 LBS | OXYGEN SATURATION: 100 % | TEMPERATURE: 97.8 F | DIASTOLIC BLOOD PRESSURE: 78 MMHG | HEIGHT: 69 IN | BODY MASS INDEX: 24.44 KG/M2 | SYSTOLIC BLOOD PRESSURE: 130 MMHG

## 2024-07-30 DIAGNOSIS — M54.2 NECK PAIN: Primary | ICD-10-CM

## 2024-07-30 PROCEDURE — 72040 X-RAY EXAM NECK SPINE 2-3 VW: CPT

## 2024-07-30 PROCEDURE — 6370000000 HC RX 637 (ALT 250 FOR IP): Performed by: PHYSICIAN ASSISTANT

## 2024-07-30 PROCEDURE — 73030 X-RAY EXAM OF SHOULDER: CPT

## 2024-07-30 PROCEDURE — 99283 EMERGENCY DEPT VISIT LOW MDM: CPT

## 2024-07-30 RX ORDER — LIDOCAINE 4 G/G
1 PATCH TOPICAL
Status: DISCONTINUED | OUTPATIENT
Start: 2024-07-30 | End: 2024-07-30 | Stop reason: HOSPADM

## 2024-07-30 RX ORDER — ACETAMINOPHEN 500 MG
1000 TABLET ORAL 3 TIMES DAILY PRN
Qty: 30 TABLET | Refills: 0 | Status: SHIPPED | OUTPATIENT
Start: 2024-07-30

## 2024-07-30 RX ORDER — LIDOCAINE 50 MG/G
1 PATCH TOPICAL DAILY
Qty: 10 PATCH | Refills: 0 | Status: SHIPPED | OUTPATIENT
Start: 2024-07-30 | End: 2024-08-09

## 2024-07-30 ASSESSMENT — PAIN DESCRIPTION - ORIENTATION: ORIENTATION: RIGHT

## 2024-07-30 ASSESSMENT — PAIN - FUNCTIONAL ASSESSMENT
PAIN_FUNCTIONAL_ASSESSMENT: 0-10
PAIN_FUNCTIONAL_ASSESSMENT: 0-10

## 2024-07-30 ASSESSMENT — PAIN DESCRIPTION - DESCRIPTORS: DESCRIPTORS: DISCOMFORT

## 2024-07-30 ASSESSMENT — PAIN SCALES - GENERAL
PAINLEVEL_OUTOF10: 6
PAINLEVEL_OUTOF10: 4

## 2024-07-30 ASSESSMENT — ENCOUNTER SYMPTOMS
ABDOMINAL PAIN: 0
SHORTNESS OF BREATH: 0
VOMITING: 0
NAUSEA: 0
DIARRHEA: 0

## 2024-07-30 ASSESSMENT — PAIN DESCRIPTION - LOCATION: LOCATION: NECK

## 2024-07-30 NOTE — DISCHARGE INSTRUCTIONS
Take medication as prescribed. Follow-up with your primary care physician within 2 days for reassessment. Bring the results from this visit with you for their review. Return to the ED immediately for any new, worsening, or persistent symptoms, including chest pain, shortness of breath, headache, extremity weakness, or any other medical concerns.

## 2024-07-30 NOTE — ED PROVIDER NOTES
EMERGENCY DEPARTMENT HISTORY AND PHYSICAL EXAM    6:03 PM      Date: 7/30/2024  Patient Name: Alice Silva    History of Presenting Illness     Chief Complaint   Patient presents with    Neck Pain         History Provided By: Patient    Additional History (Context): Alice Silva is a 89 y.o. 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 Pt notes he woke up with the symptoms.  Patient notes pain is worse with palpation and movement.  Patient notes he took Tylenol for symptoms without relief.  Patient denies dizziness, headache, changes in vision, slurred speech, facial droop, chest pain, dyspnea, extremity weakness, numbness or tingling, fall or trauma.  Patient denies any other concerns or complaints.    PCP: David Galeana MD    Current Facility-Administered Medications   Medication Dose Route Frequency Provider Last Rate Last Admin    lidocaine 4 % external patch 1 patch  1 patch TransDERmal NOW Pepper Zafar PA   1 patch at 07/30/24 5951     Current Outpatient Medications   Medication Sig Dispense Refill    acetaminophen (TYLENOL) 500 MG tablet Take 2 tablets by mouth 3 times daily as needed for Pain 30 tablet 0    lidocaine (LIDODERM) 5 % Place 1 patch onto the skin daily for 10 days 12 hours on, 12 hours off. 10 patch 0    metoprolol tartrate (LOPRESSOR) 25 MG tablet Take 0.5 tablets by mouth 2 times daily 90 tablet 3    amLODIPine (NORVASC) 5 MG tablet Take 1 tablet by mouth daily 90 tablet 3    pantoprazole (PROTONIX) 40 MG tablet take 1 tablet by mouth once daily 90 tablet 1    FEROSUL 325 (65 Fe) MG tablet take 1 tablet by mouth daily before breakfast 90 tablet 1    rosuvastatin (CRESTOR) 10 MG tablet Take 1 tablet by mouth nightly 90 tablet 3

## 2024-07-30 NOTE — ED TRIAGE NOTES
Patient presents to ER ambulatory with c/o \"stiff neck.\" Patient reports when he awakened yesterday his neck was feeling this way but not as bad as it's hurting today.   Patient had to turn head from side but with pain.

## 2024-08-28 DIAGNOSIS — I50.9 CHRONIC CONGESTIVE HEART FAILURE, UNSPECIFIED HEART FAILURE TYPE (HCC): ICD-10-CM

## 2024-08-28 DIAGNOSIS — I10 ESSENTIAL (PRIMARY) HYPERTENSION: ICD-10-CM

## 2024-08-28 RX ORDER — EMPAGLIFLOZIN 10 MG/1
10 TABLET, FILM COATED ORAL DAILY
Qty: 90 TABLET | Refills: 3 | Status: SHIPPED | OUTPATIENT
Start: 2024-08-28

## 2024-08-28 RX ORDER — FUROSEMIDE 40 MG
40 TABLET ORAL DAILY
Qty: 90 TABLET | Refills: 3 | Status: SHIPPED | OUTPATIENT
Start: 2024-08-28

## 2024-11-18 ENCOUNTER — APPOINTMENT (OUTPATIENT)
Facility: HOSPITAL | Age: 89
End: 2024-11-18
Payer: MEDICARE

## 2024-11-18 ENCOUNTER — HOSPITAL ENCOUNTER (EMERGENCY)
Facility: HOSPITAL | Age: 89
Discharge: HOME | End: 2024-11-18
Attending: STUDENT IN AN ORGANIZED HEALTH CARE EDUCATION/TRAINING PROGRAM
Payer: MEDICARE

## 2024-11-18 VITALS
OXYGEN SATURATION: 100 % | RESPIRATION RATE: 21 BRPM | HEART RATE: 50 BPM | TEMPERATURE: 97.6 F | WEIGHT: 165 LBS | BODY MASS INDEX: 24.44 KG/M2 | DIASTOLIC BLOOD PRESSURE: 50 MMHG | HEIGHT: 69 IN | SYSTOLIC BLOOD PRESSURE: 131 MMHG

## 2024-11-18 DIAGNOSIS — R42 LIGHTHEADEDNESS: Primary | ICD-10-CM

## 2024-11-18 DIAGNOSIS — B34.9 VIRAL ILLNESS: ICD-10-CM

## 2024-11-18 DIAGNOSIS — R09.89 CHEST CONGESTION: ICD-10-CM

## 2024-11-18 LAB
ANION GAP SERPL CALC-SCNC: 9 MMOL/L (ref 3–18)
APPEARANCE UR: CLEAR
BACTERIA URNS QL MICRO: NEGATIVE /HPF
BASOPHILS # BLD: 0 K/UL (ref 0–0.1)
BASOPHILS NFR BLD: 1 % (ref 0–2)
BILIRUB UR QL: NEGATIVE
BUN SERPL-MCNC: 36 MG/DL (ref 7–18)
BUN/CREAT SERPL: 19 (ref 12–20)
CALCIUM SERPL-MCNC: 8.8 MG/DL (ref 8.5–10.1)
CHLORIDE SERPL-SCNC: 107 MMOL/L (ref 100–111)
CO2 SERPL-SCNC: 23 MMOL/L (ref 21–32)
COLOR UR: YELLOW
CREAT SERPL-MCNC: 1.93 MG/DL (ref 0.6–1.3)
DIFFERENTIAL METHOD BLD: ABNORMAL
EKG DIAGNOSIS: NORMAL
EKG Q-T INTERVAL: 482 MS
EKG QRS DURATION: 76 MS
EKG QTC CALCULATION (BAZETT): 402 MS
EKG R AXIS: 23 DEGREES
EKG T AXIS: 267 DEGREES
EKG VENTRICULAR RATE: 42 BPM
EOSINOPHIL # BLD: 0.3 K/UL (ref 0–0.4)
EOSINOPHIL NFR BLD: 5 % (ref 0–5)
EPITH CASTS URNS QL MICRO: NORMAL /LPF (ref 0–5)
ERYTHROCYTE [DISTWIDTH] IN BLOOD BY AUTOMATED COUNT: 12.2 % (ref 11.6–14.5)
FLUAV RNA SPEC QL NAA+PROBE: NOT DETECTED
FLUBV RNA SPEC QL NAA+PROBE: NOT DETECTED
GLUCOSE SERPL-MCNC: 110 MG/DL (ref 74–99)
GLUCOSE UR STRIP.AUTO-MCNC: 500 MG/DL
HCT VFR BLD AUTO: 34.9 % (ref 36–48)
HGB BLD-MCNC: 11.7 G/DL (ref 13–16)
HGB UR QL STRIP: NEGATIVE
IMM GRANULOCYTES # BLD AUTO: 0 K/UL (ref 0–0.04)
IMM GRANULOCYTES NFR BLD AUTO: 1 % (ref 0–0.5)
KETONES UR QL STRIP.AUTO: NEGATIVE MG/DL
LEUKOCYTE ESTERASE UR QL STRIP.AUTO: ABNORMAL
LYMPHOCYTES # BLD: 0.9 K/UL (ref 0.9–3.6)
LYMPHOCYTES NFR BLD: 15 % (ref 21–52)
MAGNESIUM SERPL-MCNC: 2 MG/DL (ref 1.6–2.6)
MCH RBC QN AUTO: 32.9 PG (ref 24–34)
MCHC RBC AUTO-ENTMCNC: 33.5 G/DL (ref 31–37)
MCV RBC AUTO: 98 FL (ref 78–100)
MONOCYTES # BLD: 0.5 K/UL (ref 0.05–1.2)
MONOCYTES NFR BLD: 9 % (ref 3–10)
NEUTS SEG # BLD: 4 K/UL (ref 1.8–8)
NEUTS SEG NFR BLD: 69 % (ref 40–73)
NITRITE UR QL STRIP.AUTO: NEGATIVE
NRBC # BLD: 0 K/UL (ref 0–0.01)
NRBC BLD-RTO: 0 PER 100 WBC
NT PRO BNP: 8594 PG/ML (ref 0–1800)
PH UR STRIP: 5.5 (ref 5–8)
PLATELET # BLD AUTO: 200 K/UL (ref 135–420)
PMV BLD AUTO: 11.4 FL (ref 9.2–11.8)
POTASSIUM SERPL-SCNC: 4.2 MMOL/L (ref 3.5–5.5)
PROT UR STRIP-MCNC: NEGATIVE MG/DL
RBC # BLD AUTO: 3.56 M/UL (ref 4.35–5.65)
RBC #/AREA URNS HPF: NEGATIVE /HPF (ref 0–5)
SARS-COV-2 RNA RESP QL NAA+PROBE: NOT DETECTED
SODIUM SERPL-SCNC: 139 MMOL/L (ref 136–145)
SOURCE: NORMAL
SP GR UR REFRACTOMETRY: 1.01 (ref 1–1.03)
TROPONIN I SERPL HS-MCNC: 44 NG/L (ref 0–78)
TROPONIN I SERPL HS-MCNC: 58 NG/L (ref 0–78)
UROBILINOGEN UR QL STRIP.AUTO: 0.2 EU/DL (ref 0.2–1)
WBC # BLD AUTO: 5.7 K/UL (ref 4.6–13.2)
WBC URNS QL MICRO: NORMAL /HPF (ref 0–4)

## 2024-11-18 PROCEDURE — 83880 ASSAY OF NATRIURETIC PEPTIDE: CPT

## 2024-11-18 PROCEDURE — 83735 ASSAY OF MAGNESIUM: CPT

## 2024-11-18 PROCEDURE — 93010 ELECTROCARDIOGRAM REPORT: CPT | Performed by: INTERNAL MEDICINE

## 2024-11-18 PROCEDURE — 80048 BASIC METABOLIC PNL TOTAL CA: CPT

## 2024-11-18 PROCEDURE — 85025 COMPLETE CBC W/AUTO DIFF WBC: CPT

## 2024-11-18 PROCEDURE — 93005 ELECTROCARDIOGRAM TRACING: CPT | Performed by: STUDENT IN AN ORGANIZED HEALTH CARE EDUCATION/TRAINING PROGRAM

## 2024-11-18 PROCEDURE — 84484 ASSAY OF TROPONIN QUANT: CPT

## 2024-11-18 PROCEDURE — 81001 URINALYSIS AUTO W/SCOPE: CPT

## 2024-11-18 PROCEDURE — 99285 EMERGENCY DEPT VISIT HI MDM: CPT

## 2024-11-18 PROCEDURE — 87636 SARSCOV2 & INF A&B AMP PRB: CPT

## 2024-11-18 PROCEDURE — 71045 X-RAY EXAM CHEST 1 VIEW: CPT

## 2024-11-18 ASSESSMENT — ENCOUNTER SYMPTOMS
DIARRHEA: 0
COUGH: 1
SORE THROAT: 1
NAUSEA: 0
CHEST TIGHTNESS: 0
ABDOMINAL PAIN: 0
SHORTNESS OF BREATH: 0
VOMITING: 0

## 2024-11-18 ASSESSMENT — LIFESTYLE VARIABLES
HOW OFTEN DO YOU HAVE A DRINK CONTAINING ALCOHOL: 4 OR MORE TIMES A WEEK
HOW MANY STANDARD DRINKS CONTAINING ALCOHOL DO YOU HAVE ON A TYPICAL DAY: 1 OR 2

## 2024-11-18 ASSESSMENT — PAIN - FUNCTIONAL ASSESSMENT: PAIN_FUNCTIONAL_ASSESSMENT: NONE - DENIES PAIN

## 2024-11-18 NOTE — ED PROVIDER NOTES
EMERGENCY DEPARTMENT HISTORY AND PHYSICAL EXAM      Date: 11/18/2024  Patient Name: Alice Silva    History of Presenting Illness     Chief Complaint   Patient presents with    Dizziness    Fatigue       89-year-old male with history as below presenting to the emergency department for evaluation of lightheadedness.  Patient was at work this morning and felt like he needed to sit down because he was feeling lightheaded.  States that he has developed a cough with some sputum production and sore throat and nasal congestion over the past couple of days.  He does not recall any sick contacts.  He denies any chest pain or shortness of breath, abdominal pain, NVD, fevers or chills.          PCP: David Galeana MD    No current facility-administered medications for this encounter.     Current Outpatient Medications   Medication Sig Dispense Refill    JARDIANCE 10 MG tablet take 1 tablet by mouth once daily 90 tablet 3    furosemide (LASIX) 40 MG tablet take 1 tablet by mouth once daily 90 tablet 3    acetaminophen (TYLENOL) 500 MG tablet Take 2 tablets by mouth 3 times daily as needed for Pain 30 tablet 0    metoprolol tartrate (LOPRESSOR) 25 MG tablet Take 0.5 tablets by mouth 2 times daily 90 tablet 3    amLODIPine (NORVASC) 5 MG tablet Take 1 tablet by mouth daily 90 tablet 3    pantoprazole (PROTONIX) 40 MG tablet take 1 tablet by mouth once daily 90 tablet 1    FEROSUL 325 (65 Fe) MG tablet take 1 tablet by mouth daily before breakfast 90 tablet 1    rosuvastatin (CRESTOR) 10 MG tablet Take 1 tablet by mouth nightly 90 tablet 3    Cyanocobalamin ER (RA VITAMIN B-12 TR) 1000 MCG TBCR Take 1 tablet by mouth daily 90 tablet 3    aspirin 81 MG chewable tablet Take 1 tablet by mouth daily         Past History     Past Medical History:  Past Medical History:   Diagnosis Date    Arthritis     Chronic pain     left hip and lower back    Hypertension     Left hip pain     resolved since surgery    Thromboembolus (HCC) 1975

## 2024-11-18 NOTE — ED TRIAGE NOTES
Pt presents to ED for dizziness that started this AM. Pt went to work and dizziness did not improve. Denies fever, chills. Endorses cough and congestion.

## 2024-11-18 NOTE — ED NOTES
Discharge education completed.  Patient verbalized understanding.  IV removed, bleeding controlled at site. Patient ambulatory to waiting room, family at bedside, patient declined wheelchair.

## 2024-11-23 ENCOUNTER — APPOINTMENT (OUTPATIENT)
Facility: HOSPITAL | Age: 89
End: 2024-11-23
Payer: MEDICARE

## 2024-11-23 ENCOUNTER — HOSPITAL ENCOUNTER (EMERGENCY)
Facility: HOSPITAL | Age: 89
Discharge: HOME | End: 2024-11-23
Attending: STUDENT IN AN ORGANIZED HEALTH CARE EDUCATION/TRAINING PROGRAM
Payer: MEDICARE

## 2024-11-23 VITALS
HEART RATE: 45 BPM | DIASTOLIC BLOOD PRESSURE: 64 MMHG | WEIGHT: 165 LBS | SYSTOLIC BLOOD PRESSURE: 133 MMHG | TEMPERATURE: 97.9 F | OXYGEN SATURATION: 100 % | RESPIRATION RATE: 17 BRPM | BODY MASS INDEX: 24.44 KG/M2 | HEIGHT: 69 IN

## 2024-11-23 DIAGNOSIS — R00.1 BRADYCARDIA: Primary | ICD-10-CM

## 2024-11-23 DIAGNOSIS — N17.9 AKI (ACUTE KIDNEY INJURY) (HCC): ICD-10-CM

## 2024-11-23 LAB
ALBUMIN SERPL-MCNC: 3.5 G/DL (ref 3.4–5)
ALBUMIN/GLOB SERPL: 1 (ref 0.8–1.7)
ALP SERPL-CCNC: 62 U/L (ref 45–117)
ALT SERPL-CCNC: 20 U/L (ref 16–61)
ANION GAP SERPL CALC-SCNC: 6 MMOL/L (ref 3–18)
ANION GAP SERPL CALC-SCNC: 7 MMOL/L (ref 3–18)
APPEARANCE UR: CLEAR
AST SERPL-CCNC: 17 U/L (ref 10–38)
BASOPHILS # BLD: 0 K/UL (ref 0–0.1)
BASOPHILS NFR BLD: 1 % (ref 0–2)
BILIRUB SERPL-MCNC: 0.7 MG/DL (ref 0.2–1)
BILIRUB UR QL: NEGATIVE
BUN SERPL-MCNC: 38 MG/DL (ref 7–18)
BUN SERPL-MCNC: 40 MG/DL (ref 7–18)
BUN/CREAT SERPL: 16 (ref 12–20)
BUN/CREAT SERPL: 18 (ref 12–20)
CALCIUM SERPL-MCNC: 8.5 MG/DL (ref 8.5–10.1)
CALCIUM SERPL-MCNC: 8.8 MG/DL (ref 8.5–10.1)
CHLORIDE SERPL-SCNC: 107 MMOL/L (ref 100–111)
CHLORIDE SERPL-SCNC: 109 MMOL/L (ref 100–111)
CO2 SERPL-SCNC: 26 MMOL/L (ref 21–32)
CO2 SERPL-SCNC: 26 MMOL/L (ref 21–32)
COLOR UR: YELLOW
CREAT SERPL-MCNC: 2.12 MG/DL (ref 0.6–1.3)
CREAT SERPL-MCNC: 2.45 MG/DL (ref 0.6–1.3)
D DIMER PPP FEU-MCNC: 0.58 UG/ML(FEU)
DIFFERENTIAL METHOD BLD: ABNORMAL
EKG DIAGNOSIS: NORMAL
EKG Q-T INTERVAL: 486 MS
EKG QRS DURATION: 80 MS
EKG QTC CALCULATION (BAZETT): 425 MS
EKG R AXIS: -14 DEGREES
EKG T AXIS: 213 DEGREES
EKG VENTRICULAR RATE: 46 BPM
EOSINOPHIL # BLD: 0.3 K/UL (ref 0–0.4)
EOSINOPHIL NFR BLD: 4 % (ref 0–5)
ERYTHROCYTE [DISTWIDTH] IN BLOOD BY AUTOMATED COUNT: 12.7 % (ref 11.6–14.5)
GLOBULIN SER CALC-MCNC: 3.6 G/DL (ref 2–4)
GLUCOSE SERPL-MCNC: 114 MG/DL (ref 74–99)
GLUCOSE SERPL-MCNC: 86 MG/DL (ref 74–99)
GLUCOSE UR STRIP.AUTO-MCNC: 500 MG/DL
HCT VFR BLD AUTO: 32.5 % (ref 36–48)
HGB BLD-MCNC: 11.1 G/DL (ref 13–16)
HGB UR QL STRIP: NEGATIVE
IMM GRANULOCYTES # BLD AUTO: 0 K/UL (ref 0–0.04)
IMM GRANULOCYTES NFR BLD AUTO: 0 % (ref 0–0.5)
INR PPP: 1.2 (ref 0.9–1.1)
KETONES UR QL STRIP.AUTO: NEGATIVE MG/DL
LEUKOCYTE ESTERASE UR QL STRIP.AUTO: NEGATIVE
LYMPHOCYTES # BLD: 1.3 K/UL (ref 0.9–3.6)
LYMPHOCYTES NFR BLD: 20 % (ref 21–52)
MAGNESIUM SERPL-MCNC: 2 MG/DL (ref 1.6–2.6)
MCH RBC QN AUTO: 33.2 PG (ref 24–34)
MCHC RBC AUTO-ENTMCNC: 34.2 G/DL (ref 31–37)
MCV RBC AUTO: 97.3 FL (ref 78–100)
MONOCYTES # BLD: 0.9 K/UL (ref 0.05–1.2)
MONOCYTES NFR BLD: 14 % (ref 3–10)
NEUTS SEG # BLD: 3.9 K/UL (ref 1.8–8)
NEUTS SEG NFR BLD: 61 % (ref 40–73)
NITRITE UR QL STRIP.AUTO: NEGATIVE
NRBC # BLD: 0 K/UL (ref 0–0.01)
NRBC BLD-RTO: 0 PER 100 WBC
NT PRO BNP: ABNORMAL PG/ML (ref 0–1800)
PH UR STRIP: 5.5 (ref 5–8)
PLATELET # BLD AUTO: 211 K/UL (ref 135–420)
PMV BLD AUTO: 10.9 FL (ref 9.2–11.8)
POTASSIUM SERPL-SCNC: 4.4 MMOL/L (ref 3.5–5.5)
POTASSIUM SERPL-SCNC: 4.4 MMOL/L (ref 3.5–5.5)
PROT SERPL-MCNC: 7.1 G/DL (ref 6.4–8.2)
PROT UR STRIP-MCNC: NEGATIVE MG/DL
PROTHROMBIN TIME: 15.3 SEC (ref 11.9–14.9)
RBC # BLD AUTO: 3.34 M/UL (ref 4.35–5.65)
SODIUM SERPL-SCNC: 140 MMOL/L (ref 136–145)
SODIUM SERPL-SCNC: 141 MMOL/L (ref 136–145)
SP GR UR REFRACTOMETRY: 1.01 (ref 1–1.03)
TROPONIN I SERPL HS-MCNC: 49 NG/L (ref 0–78)
TROPONIN I SERPL HS-MCNC: 57 NG/L (ref 0–78)
TSH SERPL DL<=0.05 MIU/L-ACNC: 1.02 UIU/ML (ref 0.36–3.74)
UROBILINOGEN UR QL STRIP.AUTO: 0.2 EU/DL (ref 0.2–1)
WBC # BLD AUTO: 6.4 K/UL (ref 4.6–13.2)

## 2024-11-23 PROCEDURE — 85379 FIBRIN DEGRADATION QUANT: CPT

## 2024-11-23 PROCEDURE — 84484 ASSAY OF TROPONIN QUANT: CPT

## 2024-11-23 PROCEDURE — 93010 ELECTROCARDIOGRAM REPORT: CPT | Performed by: INTERNAL MEDICINE

## 2024-11-23 PROCEDURE — 80053 COMPREHEN METABOLIC PANEL: CPT

## 2024-11-23 PROCEDURE — 93005 ELECTROCARDIOGRAM TRACING: CPT | Performed by: STUDENT IN AN ORGANIZED HEALTH CARE EDUCATION/TRAINING PROGRAM

## 2024-11-23 PROCEDURE — 81003 URINALYSIS AUTO W/O SCOPE: CPT

## 2024-11-23 PROCEDURE — 83880 ASSAY OF NATRIURETIC PEPTIDE: CPT

## 2024-11-23 PROCEDURE — 85025 COMPLETE CBC W/AUTO DIFF WBC: CPT

## 2024-11-23 PROCEDURE — 96360 HYDRATION IV INFUSION INIT: CPT

## 2024-11-23 PROCEDURE — 83735 ASSAY OF MAGNESIUM: CPT

## 2024-11-23 PROCEDURE — 99285 EMERGENCY DEPT VISIT HI MDM: CPT

## 2024-11-23 PROCEDURE — 85610 PROTHROMBIN TIME: CPT

## 2024-11-23 PROCEDURE — 71046 X-RAY EXAM CHEST 2 VIEWS: CPT

## 2024-11-23 PROCEDURE — 2580000003 HC RX 258: Performed by: STUDENT IN AN ORGANIZED HEALTH CARE EDUCATION/TRAINING PROGRAM

## 2024-11-23 PROCEDURE — 84443 ASSAY THYROID STIM HORMONE: CPT

## 2024-11-23 RX ORDER — 0.9 % SODIUM CHLORIDE 0.9 %
500 INTRAVENOUS SOLUTION INTRAVENOUS ONCE
Status: COMPLETED | OUTPATIENT
Start: 2024-11-23 | End: 2024-11-23

## 2024-11-23 RX ORDER — METOPROLOL TARTRATE 25 MG/1
12.5 TABLET, FILM COATED ORAL DAILY
Qty: 90 TABLET | Refills: 3 | Status: SHIPPED | OUTPATIENT
Start: 2024-11-23

## 2024-11-23 RX ADMIN — SODIUM CHLORIDE 500 ML: 9 INJECTION, SOLUTION INTRAVENOUS at 13:12

## 2024-11-23 NOTE — DISCHARGE INSTRUCTIONS
Please follow-up with your cardiologist or primary care physician in a week for reevaluation of symptoms and to have your kidney function rechecked.    Change metoprolol medication to once a day.  Decreased from twice a day.    Do not take your furosemide (Lasix) medication for 2 days.  However after 2 days restart as normal.    If your symptoms do not improve, worsen or you develop other concerns and please return to the emergency department.

## 2024-11-23 NOTE — FLOWSHEET NOTE
11/23/24 1448   Departure Condition   Mobility at Departure Ambulatory   Ambulatory Mobility With No Difficulty   Departure Mode With family   Discharged To Home   Patient Teaching Discharge instructions reviewed;Patient verbalized understanding   Discharged with LDA No

## 2024-11-23 NOTE — ED PROVIDER NOTES
Unknown (6/6/2024)    Housing Stability Vital Sign     Unstable Housing in the Last Year: No       SCREENINGS         Mary Coma Scale  Eye Opening: Spontaneous  Best Verbal Response: Oriented  Best Motor Response: Obeys commands  Bentley Coma Scale Score: 15                     CIWA Assessment  BP: 133/64  Pulse: (!) 45                 PHYSICAL EXAM    (up to 7 for level 4, 8 or more for level 5)     ED Triage Vitals   BP Systolic BP Percentile Diastolic BP Percentile Temp Temp Source Pulse Respirations SpO2   11/23/24 1208 -- -- 11/23/24 1208 11/23/24 1208 11/23/24 1208 11/23/24 1208 11/23/24 1208   125/61   97.9 °F (36.6 °C) Oral 64 16 99 %      Height Weight - Scale         11/23/24 1208 11/23/24 1208         1.753 m (5' 9\") 74.8 kg (165 lb)             Physical Exam    General: No acute distress  Head: Normocephalic, atraumatic  Psych: Cooperative and alert  Eyes: No scleral icterus, normal conjunctiva  ENT: Moist oral mucosa  Neck: Supple  CV: Bradycardic regular rhythm, no pitting edema, palpable radial and DP pulses bilaterally  Pulm: Clear breath sounds bilaterally without any wheezing or rhonchi, normal respiratory rate  GI: Normal bowel sounds, soft, non-tender  MSK: Moves all four extremities, negative Homans' sign  Skin: No rashes  Neuro: Alert and conversive      DIAGNOSTIC RESULTS     EKG: All EKG's are interpreted by the Emergency Department Physician who either signs or Co-signs this chart in the absence of a cardiologist.    EKG shows a bradycardic rhythm at a rate of 46 bpm.  No obvious P waves before each QRS consistent with patient's known A-fib.  QRS is narrow, axis is normal, R wave progression is pericardium is satisfactory.  No specific ST elevations or depressions noted.  Has decreased amplitude throughout.  Overall consistent with A-fib at a slow rate.    RADIOLOGY:   Non-plain film images such as CT, Ultrasound and MRI are read by the radiologist. Plain radiographic images are  [facility]  [address]  [place] [facility] [address]  [phone]    Schedule an appointment as soon as possible for a visit in 1 week      Jose Fraser MD  [address] [facility]  [address]  [facility] [facility] [address]  [phone]    Schedule an appointment as soon as possible for a visit in 1 week        DISCHARGE MEDICATIONS:  Discharge Medication List as of 11/23/2024  2:41 PM        Controlled Substances Monitoring:          No data to display                (Please note that portions of this note were completed with a voice recognition program.  Efforts were made to edit the dictations but occasionally words are mis-transcribed.)    Cris Ardon MD (electronically signed)  Attending Emergency Physician            Cris Ardon MD  11/23/24 1235

## 2024-11-23 NOTE — ED TRIAGE NOTES
Pt presents to ED for shortness of breath, intermittent dizziness, and R leg cramping.     Pt seen on 11/18 for similar complaints.     Pt ambulatory from car to ED bed without difficulty.

## 2024-11-26 ENCOUNTER — TELEPHONE (OUTPATIENT)
Facility: CLINIC | Age: 88
End: 2024-11-26

## 2024-11-26 NOTE — PROGRESS NOTES
Chief Complaint   Patient presents with    Follow-Up from Hospital     Hospital follow up [facility] for stroke like symptoms 11/24/2024 to 11/26/2024      \"Have you been to the ER, urgent care clinic since your last visit?  Hospitalized since your last visit?\"    YES - When: approximately 3 days ago.  Where and Why: on 11/24/2024 to 11/226/2024 [facility] for a stroke.    “Have you seen or consulted any other health care providers outside our system since your last visit?”    NO

## 2024-11-26 NOTE — PATIENT INSTRUCTIONS

## 2024-11-26 NOTE — TELEPHONE ENCOUNTER
Incoming call from patient granddaughter patient was admitted to [facility] for a Stroke and admitted on Sunday November 24, 2024 and being discharged on Tuesday November 26,2024. I advise the patient granddaughter that I will sent a message to both Dr & Nurse to see where Dr. Galeana wants to see this patient with date and time.

## 2024-11-26 NOTE — TELEPHONE ENCOUNTER
Left message via voicemail for the grand daughter of Mr. Alice Silva to call [facility] [place] at [phone] option #1

## 2024-11-27 ENCOUNTER — OFFICE VISIT (OUTPATIENT)
Facility: CLINIC | Age: 88
End: 2024-11-27

## 2024-11-27 VITALS
TEMPERATURE: 98 F | DIASTOLIC BLOOD PRESSURE: 66 MMHG | OXYGEN SATURATION: 98 % | BODY MASS INDEX: 24.14 KG/M2 | WEIGHT: 163 LBS | HEART RATE: 75 BPM | HEIGHT: 69 IN | RESPIRATION RATE: 14 BRPM | SYSTOLIC BLOOD PRESSURE: 124 MMHG

## 2024-11-27 DIAGNOSIS — R73.01 IMPAIRED FASTING GLUCOSE: ICD-10-CM

## 2024-11-27 DIAGNOSIS — M48.061 SPINAL STENOSIS, LUMBAR REGION WITHOUT NEUROGENIC CLAUDICATION: ICD-10-CM

## 2024-11-27 DIAGNOSIS — N18.32 STAGE 3B CHRONIC KIDNEY DISEASE (HCC): ICD-10-CM

## 2024-11-27 DIAGNOSIS — E53.8 DEFICIENCY OF OTHER SPECIFIED B GROUP VITAMINS: ICD-10-CM

## 2024-11-27 DIAGNOSIS — N40.1 BENIGN PROSTATIC HYPERPLASIA WITH LOWER URINARY TRACT SYMPTOMS, SYMPTOM DETAILS UNSPECIFIED: ICD-10-CM

## 2024-11-27 DIAGNOSIS — D50.9 IRON DEFICIENCY ANEMIA, UNSPECIFIED IRON DEFICIENCY ANEMIA TYPE: ICD-10-CM

## 2024-11-27 DIAGNOSIS — I10 ESSENTIAL (PRIMARY) HYPERTENSION: Primary | ICD-10-CM

## 2024-11-27 DIAGNOSIS — I48.0 PAROXYSMAL A-FIB (HCC): ICD-10-CM

## 2024-11-27 DIAGNOSIS — I50.9 CHRONIC CONGESTIVE HEART FAILURE, UNSPECIFIED HEART FAILURE TYPE (HCC): ICD-10-CM

## 2024-11-27 DIAGNOSIS — Z09 HOSPITAL DISCHARGE FOLLOW-UP: ICD-10-CM

## 2024-11-27 PROBLEM — R00.1 BRADYCARDIA: Status: ACTIVE | Noted: 2024-11-24

## 2024-11-27 PROBLEM — R29.90 STROKE-LIKE SYMPTOMS: Status: ACTIVE | Noted: 2024-11-24

## 2024-11-27 PROBLEM — I48.91 ATRIAL FIBRILLATION (HCC): Status: ACTIVE | Noted: 2024-11-24

## 2024-11-27 RX ORDER — POLYVINYL ALCOHOL 14 MG/ML
1 SOLUTION/ DROPS OPHTHALMIC PRN
COMMUNITY

## 2024-11-27 RX ORDER — ATORVASTATIN CALCIUM 40 MG/1
1 TABLET, FILM COATED ORAL
COMMUNITY
Start: 2024-11-26

## 2024-11-27 RX ORDER — TIMOLOL 5.12 MG/ML
1 SOLUTION/ DROPS OPHTHALMIC EVERY MORNING
COMMUNITY

## 2024-11-27 NOTE — TELEPHONE ENCOUNTER
Requested Prescriptions     Pending Prescriptions Disp Refills    FEROSUL 325 (65 Fe) MG tablet [Pharmacy Med Name: FEROSUL 325 MG TABLET] 90 tablet 1     Sig: take 1 tablet by mouth every morning before breakfast          This will be discussed at Mr. Alice Silva appointment this afternoon 11/27/2024 at 3:30 pm

## 2024-11-27 NOTE — PROGRESS NOTES
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   He says all symptoms have resolved.  He was found to be in a-fib.  He was placed on Eliquis and atorvastatin.  He was advised to stop metoprolol and crestor.  He is seen within 2 days of discharge so no need for Nurse Navigator outreach. We reconciled his meds.    Patient is doing very well otherwise.   Still working at [employer].  Will need short term disability paperwork filled out.  Start date of leave is 11/18.  He was seen in the ER on that day and then in the ER 11/23 and admitted 11/24.  He would like to go back to work 12/7.    Back doing well off Lyrica.  He has been using tylenol with some relief.  He has been advised by his spine specialist to follow-up with PCP since he is not a surgical candidate.  He used to get relief with lidoderm patches.    He has a history of HTN.  He is taking lasix.  The patient denies any chest pain or chest tightness.  Denies any dyspnea upon exertion.  Sees cardiology as well but never for a-fib.  Has appt in Jan.    He has a history of a bleeding esophageal ulcer.  He has no abdominal complaints .  In the past he was diagnosed with a B12 deficiency. He is taking iron and B12.  He stopped seeing GI.  They recommended a PPI indefinitely which he says he is on.  He has not had any melena or BRBPR.     ROS:  History obtained from the patient   Respiratory: no cough, shortness of breath, or wheezing.      Cardiovascular: no chest pain, palpitations, or dyspnea on exertion  Neurological: no numbness, tingling, headache or dizziness.  Dizziness has resolved.  : no hematuria, dysuria, frequency, hesitancy, or nocturia.  Diagnosed with right hydrocele and BPH.  He is supposed to be under the care of urology but was lost to follow-up.  No

## 2024-11-29 RX ORDER — FERROUS SULFATE 325(65) MG
1 TABLET ORAL
Qty: 90 TABLET | Refills: 1 | Status: SHIPPED | OUTPATIENT
Start: 2024-11-29

## 2025-01-13 DIAGNOSIS — Z87.11 HISTORY OF GASTRIC ULCER: ICD-10-CM

## 2025-01-14 RX ORDER — PANTOPRAZOLE SODIUM 40 MG/1
40 TABLET, DELAYED RELEASE ORAL DAILY
Qty: 90 TABLET | Refills: 1 | Status: SHIPPED | OUTPATIENT
Start: 2025-01-14

## 2025-01-16 ENCOUNTER — HOSPITAL ENCOUNTER (OUTPATIENT)
Facility: HOSPITAL | Age: 89
Setting detail: SPECIMEN
Discharge: HOME | End: 2025-01-19

## 2025-01-16 LAB — SENTARA SPECIMEN COLLECTION: NORMAL

## 2025-01-16 PROCEDURE — 99001 SPECIMEN HANDLING PT-LAB: CPT

## 2025-01-17 LAB
ANION GAP SERPL CALCULATED.3IONS-SCNC: 11 MMOL/L (ref 3–15)
BUN BLDV-MCNC: 23 MG/DL (ref 6–22)
CALCIUM SERPL-MCNC: 9.4 MG/DL (ref 8.4–10.5)
CHLORIDE BLD-SCNC: 105 MMOL/L (ref 98–110)
CO2: 27 MMOL/L (ref 20–32)
CREAT SERPL-MCNC: 1.7 MG/DL (ref 0.8–1.6)
GFR, ESTIMATED: 37.2 ML/MIN/1.73 SQ.M.
GLUCOSE: 84 MG/DL (ref 70–99)
POTASSIUM SERPL-SCNC: 4.4 MMOL/L (ref 3.5–5.5)
SODIUM BLD-SCNC: 143 MMOL/L (ref 133–145)

## 2025-01-28 ENCOUNTER — OFFICE VISIT (OUTPATIENT)
Age: 89
End: 2025-01-28
Payer: MEDICARE

## 2025-01-28 VITALS
DIASTOLIC BLOOD PRESSURE: 60 MMHG | SYSTOLIC BLOOD PRESSURE: 130 MMHG | BODY MASS INDEX: 22.07 KG/M2 | WEIGHT: 149 LBS | HEART RATE: 71 BPM | OXYGEN SATURATION: 98 % | HEIGHT: 69 IN

## 2025-01-28 DIAGNOSIS — I63.10 CEREBROVASCULAR ACCIDENT (CVA) DUE TO EMBOLISM OF PRECEREBRAL ARTERY (HCC): ICD-10-CM

## 2025-01-28 DIAGNOSIS — I10 HYPERTENSION, UNSPECIFIED TYPE: ICD-10-CM

## 2025-01-28 DIAGNOSIS — I48.0 PAROXYSMAL ATRIAL FIBRILLATION (HCC): Primary | ICD-10-CM

## 2025-01-28 DIAGNOSIS — N18.30 STAGE 3 CHRONIC KIDNEY DISEASE, UNSPECIFIED WHETHER STAGE 3A OR 3B CKD (HCC): ICD-10-CM

## 2025-01-28 DIAGNOSIS — I50.32 CHRONIC HEART FAILURE WITH PRESERVED EJECTION FRACTION (HCC): ICD-10-CM

## 2025-01-28 DIAGNOSIS — E78.5 DYSLIPIDEMIA: ICD-10-CM

## 2025-01-28 PROCEDURE — 1159F MED LIST DOCD IN RCRD: CPT | Performed by: INTERNAL MEDICINE

## 2025-01-28 PROCEDURE — 99215 OFFICE O/P EST HI 40 MIN: CPT | Performed by: INTERNAL MEDICINE

## 2025-01-28 PROCEDURE — 1123F ACP DISCUSS/DSCN MKR DOCD: CPT | Performed by: INTERNAL MEDICINE

## 2025-01-28 PROCEDURE — 1126F AMNT PAIN NOTED NONE PRSNT: CPT | Performed by: INTERNAL MEDICINE

## 2025-01-28 PROCEDURE — 93000 ELECTROCARDIOGRAM COMPLETE: CPT | Performed by: INTERNAL MEDICINE

## 2025-01-28 PROCEDURE — 1160F RVW MEDS BY RX/DR IN RCRD: CPT | Performed by: INTERNAL MEDICINE

## 2025-01-28 RX ORDER — AMLODIPINE BESYLATE 5 MG/1
5 TABLET ORAL DAILY
COMMUNITY

## 2025-01-28 ASSESSMENT — ANXIETY QUESTIONNAIRES
4. TROUBLE RELAXING: NOT AT ALL
5. BEING SO RESTLESS THAT IT IS HARD TO SIT STILL: NOT AT ALL
2. NOT BEING ABLE TO STOP OR CONTROL WORRYING: NOT AT ALL
1. FEELING NERVOUS, ANXIOUS, OR ON EDGE: NOT AT ALL
7. FEELING AFRAID AS IF SOMETHING AWFUL MIGHT HAPPEN: NOT AT ALL
GAD7 TOTAL SCORE: 0
6. BECOMING EASILY ANNOYED OR IRRITABLE: NOT AT ALL
3. WORRYING TOO MUCH ABOUT DIFFERENT THINGS: NOT AT ALL

## 2025-01-28 ASSESSMENT — ENCOUNTER SYMPTOMS
NAUSEA: 0
VOMITING: 0
SHORTNESS OF BREATH: 0
COUGH: 0
ABDOMINAL PAIN: 0
SORE THROAT: 0
ABDOMINAL DISTENTION: 0

## 2025-01-28 ASSESSMENT — PATIENT HEALTH QUESTIONNAIRE - PHQ9
2. FEELING DOWN, DEPRESSED OR HOPELESS: NOT AT ALL
SUM OF ALL RESPONSES TO PHQ9 QUESTIONS 1 & 2: 0
SUM OF ALL RESPONSES TO PHQ QUESTIONS 1-9: 0
1. LITTLE INTEREST OR PLEASURE IN DOING THINGS: NOT AT ALL
SUM OF ALL RESPONSES TO PHQ QUESTIONS 1-9: 0

## 2025-01-28 NOTE — TELEPHONE ENCOUNTER
This patient contacted office for the following prescriptions to be filled:    Medication requested :   atorvastatin (LIPITOR) 40 MG tablet QTY 90   apixaban (ELIQUIS) 2.5 MG TABS tablet   PCP: clarice  Pharmacy or Print: [facility]   Mail order or Local pharmacy  [address]     Scheduled appointment if not seen by current providers in office: LOV 11/27/2024  FU 6/18/2025

## 2025-01-28 NOTE — PROGRESS NOTES
Alice Silva presents today for   Chief Complaint   Patient presents with    Follow-up     6 month       Alice Silva preferred language for health care discussion is english/other.    Is someone accompanying this pt? yes    Is the patient using any DME equipment during OV? no    Depression Screening:  Depression: Not at risk (1/28/2025)    PHQ-2     PHQ-2 Score: 0        Learning Assessment:  Who is the primary learner? Patient    What is the preferred language for health care of the primary learner? ENGLISH    How does the primary learner prefer to learn new concepts? DEMONSTRATION    Answered By patient    Relationship to Learner SELF           Pt currently taking Anticoagulant therapy? Eliquis 5 mg bid    Pt currently taking Antiplatelet therapy ? no      Coordination of Care:  1. Have you been to the ER, urgent care clinic since your last visit? Hospitalized since your last visit? no    2. Have you seen or consulted any other health care providers outside of the [facility] since your last visit? Include any pap smears or colon screening. no

## 2025-01-28 NOTE — PROGRESS NOTES
01/28/25     Alice Silva  is a 89 y.o. male     Chief Complaint   Patient presents with    Follow-up     6 month       HPI    Patient presents for a follow-up office visit.  He was seen in the emergency room in December 2022 for worsening shortness of breath.  He was diagnosed with decompensated heart failure based on elevated NT proBNP level, and abnormal imaging which showed bilateral pleural effusions on his CT scan with pulmonary edema present.  He was given a dosage of IV furosemide and sent home with oral furosemide 40 mg daily. He underwent an echocardiogram was completed in January 2023 which showed preserved LV function, EF 60-65%, moderate concentric LVH, diastolic dysfunction, with mild mitral and tricuspid regurgitation.    He was hospitalized in December 2023 at [facility].  He was found to have a relatively normal EKG, serial high sensitive troponin levels were minimally elevated but flat, so he underwent noninvasive cardiac testing including an echocardiogram and a pharmacologic nuclear stress test.  His nuclear stress test was normal and low risk.  His echocardiogram showed preserved LVEF of 50%, mild left atrial enlargement with no valvular heart disease.  He did have an elevated NT proBNP level at 4800.  His blood pressure medications were lowered presumed due to borderline low blood pressure.     Since last visit, he was hospitalized in November 2024 at [facility] with an acute CVA when he presented with slurred speech and unilateral weakness.  He was found to be in new onset atrial fibrillation with a slow ventricular rate in the 40s, so his metoprolol was stopped altogether, and he was started on Eliquis 2.5 mg twice daily for oral anticoagulation.  An echocardiogram during that hospital stay, November 2024 showed preserved EF of 65%, moderate concentric LVH, mild left atrial enlargement with no significant valvular heart disease.  He wore a 14-day event monitor in the outpatient setting in November

## 2025-01-28 NOTE — PATIENT INSTRUCTIONS
Medication Stopping : Metoprolol Tartrate (Lopressor)  Apparently stopped during hospital stay in November 2024

## 2025-01-29 RX ORDER — ATORVASTATIN CALCIUM 40 MG/1
40 TABLET, FILM COATED ORAL
Qty: 90 TABLET | Refills: 1 | Status: SHIPPED | OUTPATIENT
Start: 2025-01-29

## 2025-03-03 ENCOUNTER — TELEPHONE (OUTPATIENT)
Facility: CLINIC | Age: 89
End: 2025-03-03

## 2025-03-03 NOTE — TELEPHONE ENCOUNTER
Incoming call received from [facility] calling about the patient  insurance missing from Lab Form that was received on 01/17/2025 with an order date of November 27 th 2024.

## 2025-03-03 NOTE — TELEPHONE ENCOUNTER
[facility] are wanting completed copy of demographics fax to their department for re-billing lab (BMP).

## 2025-06-17 NOTE — PROGRESS NOTES
Chief Complaint   Patient presents with    Medicare AWV      Have you been to the ER, urgent care clinic since your last visit?  Hospitalized since your last visit?   NO    Have you seen or consulted any other health care providers outside our system since your last visit?   YES, Dr. Albarado on 06/12/2025 for glaucoma

## 2025-06-17 NOTE — PATIENT INSTRUCTIONS
Patient Education        A Healthy Lifestyle: Care Instructions  A healthy lifestyle can help you feel good, have more energy, and stay at a weight that's healthy for you. You can share a healthy lifestyle with your friends and family. And you can do it on your own.    Eat meals with your friends or family. You could try cooking together.   Plan activities with other people. Go for a walk with a friend, try a free online fitness class, or join a sports league.     Eat a variety of healthy foods. These include fruits, vegetables, whole grains, low-fat dairy, and lean protein.   Choose healthy portions of food. You can use the Nutrition Facts label on food packages as a guide.     Eat more fruits and vegetables. You could add vegetables to sandwiches or add fruit to cereal.   Drink water when you are thirsty. Limit soda, juice, and sports drinks.     Try to exercise most days. Aim for at least 2½ hours of exercise each week.   Keep moving. Work in the garden or take your dog on a walk. Use the stairs instead of the elevator.     If you use tobacco or nicotine, try to quit. Ask your doctor about programs and medicines to help you quit.   Limit alcohol. Men should have no more than 2 drinks a day. Women should have no more than 1. For some people, no alcohol is the best choice.   Follow-up care is a key part of your treatment and safety. Be sure to make and go to all appointments, and call your doctor if you are having problems. It's also a good idea to know your test results and keep a list of the medicines you take.  Where can you learn more?  Go to https://www.healthHard [employer].net/patientEd and enter U807 to learn more about \"A Healthy Lifestyle: Care Instructions.\"  Current as of: April 30, 2024  Content Version: 14.5  © 2527-6168 [employer] [facility] [employer].   Care instructions adapted under license by [employer]. If you have questions about a medical condition or this instruction, always ask your healthcare professional.

## 2025-06-18 ENCOUNTER — OFFICE VISIT (OUTPATIENT)
Facility: CLINIC | Age: 89
End: 2025-06-18
Payer: MEDICARE

## 2025-06-18 VITALS
BODY MASS INDEX: 21.92 KG/M2 | HEIGHT: 69 IN | TEMPERATURE: 98.5 F | OXYGEN SATURATION: 98 % | BODY MASS INDEX: 21.92 KG/M2 | SYSTOLIC BLOOD PRESSURE: 120 MMHG | TEMPERATURE: 98.5 F | WEIGHT: 148 LBS | DIASTOLIC BLOOD PRESSURE: 74 MMHG | OXYGEN SATURATION: 98 % | DIASTOLIC BLOOD PRESSURE: 74 MMHG | HEART RATE: 61 BPM | HEIGHT: 69 IN | HEART RATE: 61 BPM | RESPIRATION RATE: 16 BRPM | WEIGHT: 148 LBS | SYSTOLIC BLOOD PRESSURE: 120 MMHG | RESPIRATION RATE: 16 BRPM

## 2025-06-18 DIAGNOSIS — N18.32 STAGE 3B CHRONIC KIDNEY DISEASE (HCC): ICD-10-CM

## 2025-06-18 DIAGNOSIS — R73.01 IMPAIRED FASTING GLUCOSE: ICD-10-CM

## 2025-06-18 DIAGNOSIS — Z71.89 ACP (ADVANCE CARE PLANNING): ICD-10-CM

## 2025-06-18 DIAGNOSIS — M48.061 SPINAL STENOSIS, LUMBAR REGION WITHOUT NEUROGENIC CLAUDICATION: ICD-10-CM

## 2025-06-18 DIAGNOSIS — I10 ESSENTIAL (PRIMARY) HYPERTENSION: ICD-10-CM

## 2025-06-18 DIAGNOSIS — D50.9 IRON DEFICIENCY ANEMIA, UNSPECIFIED IRON DEFICIENCY ANEMIA TYPE: ICD-10-CM

## 2025-06-18 DIAGNOSIS — E53.8 DEFICIENCY OF OTHER SPECIFIED B GROUP VITAMINS: ICD-10-CM

## 2025-06-18 DIAGNOSIS — I50.9 CHRONIC CONGESTIVE HEART FAILURE, UNSPECIFIED HEART FAILURE TYPE (HCC): ICD-10-CM

## 2025-06-18 DIAGNOSIS — N43.3 HYDROCELE IN ADULT: ICD-10-CM

## 2025-06-18 DIAGNOSIS — N40.1 BENIGN PROSTATIC HYPERPLASIA WITH LOWER URINARY TRACT SYMPTOMS, SYMPTOM DETAILS UNSPECIFIED: ICD-10-CM

## 2025-06-18 DIAGNOSIS — I48.0 PAROXYSMAL A-FIB (HCC): ICD-10-CM

## 2025-06-18 DIAGNOSIS — I10 ESSENTIAL (PRIMARY) HYPERTENSION: Primary | ICD-10-CM

## 2025-06-18 DIAGNOSIS — Z00.00 MEDICARE ANNUAL WELLNESS VISIT, SUBSEQUENT: Primary | ICD-10-CM

## 2025-06-18 PROCEDURE — 1159F MED LIST DOCD IN RCRD: CPT | Performed by: FAMILY MEDICINE

## 2025-06-18 PROCEDURE — 1126F AMNT PAIN NOTED NONE PRSNT: CPT | Performed by: FAMILY MEDICINE

## 2025-06-18 PROCEDURE — 1123F ACP DISCUSS/DSCN MKR DOCD: CPT | Performed by: FAMILY MEDICINE

## 2025-06-18 PROCEDURE — G0439 PPPS, SUBSEQ VISIT: HCPCS | Performed by: FAMILY MEDICINE

## 2025-06-18 PROCEDURE — 1125F AMNT PAIN NOTED PAIN PRSNT: CPT | Performed by: FAMILY MEDICINE

## 2025-06-18 PROCEDURE — 1160F RVW MEDS BY RX/DR IN RCRD: CPT | Performed by: FAMILY MEDICINE

## 2025-06-18 PROCEDURE — 99214 OFFICE O/P EST MOD 30 MIN: CPT | Performed by: FAMILY MEDICINE

## 2025-06-18 PROCEDURE — 99497 ADVNCD CARE PLAN 30 MIN: CPT | Performed by: FAMILY MEDICINE

## 2025-06-18 RX ORDER — ATORVASTATIN CALCIUM 40 MG/1
40 TABLET, FILM COATED ORAL
Qty: 90 TABLET | Refills: 3 | Status: SHIPPED | OUTPATIENT
Start: 2025-06-18

## 2025-06-18 SDOH — ECONOMIC STABILITY: FOOD INSECURITY: WITHIN THE PAST 12 MONTHS, THE FOOD YOU BOUGHT JUST DIDN'T LAST AND YOU DIDN'T HAVE MONEY TO GET MORE.: NEVER TRUE

## 2025-06-18 SDOH — ECONOMIC STABILITY: FOOD INSECURITY: WITHIN THE PAST 12 MONTHS, YOU WORRIED THAT YOUR FOOD WOULD RUN OUT BEFORE YOU GOT MONEY TO BUY MORE.: NEVER TRUE

## 2025-06-18 ASSESSMENT — LIFESTYLE VARIABLES
HOW MANY STANDARD DRINKS CONTAINING ALCOHOL DO YOU HAVE ON A TYPICAL DAY: 1 OR 2
HOW OFTEN DO YOU HAVE A DRINK CONTAINING ALCOHOL: MONTHLY OR LESS

## 2025-06-18 ASSESSMENT — PATIENT HEALTH QUESTIONNAIRE - PHQ9
SUM OF ALL RESPONSES TO PHQ QUESTIONS 1-9: 0
SUM OF ALL RESPONSES TO PHQ QUESTIONS 1-9: 0
2. FEELING DOWN, DEPRESSED OR HOPELESS: NOT AT ALL
SUM OF ALL RESPONSES TO PHQ QUESTIONS 1-9: 0
SUM OF ALL RESPONSES TO PHQ QUESTIONS 1-9: 0
1. LITTLE INTEREST OR PLEASURE IN DOING THINGS: NOT AT ALL

## 2025-06-18 NOTE — PROGRESS NOTES
Chief Complaint   Patient presents with    Medicare AWV Medicare Annual Wellness Visit    Alice Silva is here for Medicare AWV    Assessment & Plan   Medicare annual wellness visit, subsequent  ACP (advance care planning)  -     [place] Advanced Care Planning (16-30 minutes) [11470]     ACP reviewed.  AWV annually.       Subjective     Patient's complete Health Risk Assessment and screening values have been reviewed and are found in Flowsheets. The following problems were reviewed today and where indicated follow up appointments were made and/or referrals ordered.    Positive Risk Factor Screenings with Interventions:    Fall Risk:  Do you feel unsteady or are you worried about falling? : (!) yes  2 or more falls in past year?: no  Fall with injury in past year?: no     Interventions:    Reviewed medications, home hazards, visual acuity, and co-morbidities that can increase risk for falls  See AVS for additional education material                Dentist Screen:  Have you seen the dentist within the past year?: (!) No    Intervention:  Advised to schedule with their dentist    Hearing Screen:  Do you or your family notice any trouble with your hearing that hasn't been managed with hearing aids?: (!) Yes    Interventions:  Has seen audiology in the past but hearing aids were cost prohibitive.                 Objective   Vitals:    06/18/25 1438   BP: 120/74   BP Site: Right Upper Arm   Patient Position: Sitting   BP Cuff Size: Large Adult   Pulse: 61   Resp: 16   Temp: 98.5 °F (36.9 °C)   TempSrc: Skin   SpO2: 98%   Weight: 67.1 kg (148 lb)   Height: 1.753 m (5' 9\")      Body mass index is 21.86 kg/m².               No Known Allergies  Prior to Visit Medications    Medication Sig Taking? Authorizing Provider   apixaban (ELIQUIS) 2.5 MG TABS tablet Take 1 tablet by mouth 2 times daily Yes David Galeana MD   amLODIPine (NORVASC) 5 MG tablet Take 1 tablet by mouth daily Yes Provider, MD Godwin   pantoprazole

## 2025-06-18 NOTE — ACP (ADVANCE CARE PLANNING)
Advance Care Planning      Advance Care Planning (ACP) Physician/NP/PA (Provider) Conversation        Date of ACP Conversation: 6/18/2025    Conversation Conducted with:   Patient with Decision Making Capacity    Health Care Decision Maker:      Primary Decision Maker: AndradeJesus Alberto - Grandchild - [phone]    Secondary Decision Maker: Kaleigh Hartman - Other - [phone]      Care Preferences:    Patient's ACP is on file and is reviewed in depth with him.  No changes endorsed.    Conversation Outcomes / Follow-Up Plan:   Review annually.   No changes endorsed at this time      Length of Voluntary ACP Conversation in minutes:  16 minutes      David Galeana MD

## 2025-06-18 NOTE — PROGRESS NOTES
Chief Complaint   Patient presents with    Hyperlipidemia    Gastroesophageal Reflux    IFG    Groin Pain     C/O left groin pain      Have you been to the ER, urgent care clinic since your last visit?  Hospitalized since your last visit?   NO    Have you seen or consulted any other health care providers outside our system since your last visit?   YES, Dr. Albarado on 06/12/2025 for glaucoma

## 2025-06-18 NOTE — PROGRESS NOTES
SUBJECTIVE  Chief Complaint   Patient presents with    Hyperlipidemia    Gastroesophageal Reflux    IFG    Groin Pain     C/O left groin pain     Patient has no new complaints.   His groin pain is actually right sided and is in his scrotum he says.  It is an old complaint. He had seen urology in the past.  Has a know hydrocele that he thinks has grown.  He was lost to follow-up after Dr. Basilio restired.    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   He had all symptoms resolved.  He was found to be in a-fib at the time.  He was placed on Eliquis and atorvastatin.  He has a history of HTN.  He is taking lasix.  The patient denies any chest pain or chest tightness.  Denies any dyspnea upon exertion.  He is keeping up with cardiology visits.  Today he cannot properly reconcile his meds.  He is missing B12, norvasc, lipitor, and jardiance from the bottles he brings.  He is unsure if he has those at home.    Back doing well off Lyrica.  He has been using tylenol with some relief.  He has been advised by his spine specialist to follow-up with PCP since he is not a surgical candidate.  He used to get relief with lidoderm patches.    He has a history of a bleeding esophageal ulcer.  He has no abdominal complaints .  In the past he was diagnosed with a B12 deficiency. He is taking iron but not sure about B12.  He stopped seeing GI.  They recommended a PPI indefinitely which he says he is on.  He has not had any melena or BRBPR.     ROS:  History obtained from the patient   Respiratory: no cough, shortness of breath, or wheezing.      Cardiovascular: no chest pain, palpitations, or dyspnea on exertion  Neurological: no numbness, tingling, headache or dizziness.  Dizziness has resolved.  : no hematuria, dysuria, frequency, hesitancy, or nocturia.  Diagnosed with right hydrocele and BPH.  He is supposed to be under the care of urology but was lost to follow-up.  No longer on prostate meds and doing

## 2025-06-18 NOTE — PATIENT INSTRUCTIONS
Patient Education        A Healthy Lifestyle: Care Instructions  A healthy lifestyle can help you feel good, have more energy, and stay at a weight that's healthy for you. You can share a healthy lifestyle with your friends and family. And you can do it on your own.    Eat meals with your friends or family. You could try cooking together.   Plan activities with other people. Go for a walk with a friend, try a free online fitness class, or join a sports league.     Eat a variety of healthy foods. These include fruits, vegetables, whole grains, low-fat dairy, and lean protein.   Choose healthy portions of food. You can use the Nutrition Facts label on food packages as a guide.     Eat more fruits and vegetables. You could add vegetables to sandwiches or add fruit to cereal.   Drink water when you are thirsty. Limit soda, juice, and sports drinks.     Try to exercise most days. Aim for at least 2½ hours of exercise each week.   Keep moving. Work in the garden or take your dog on a walk. Use the stairs instead of the elevator.     If you use tobacco or nicotine, try to quit. Ask your doctor about programs and medicines to help you quit.   Limit alcohol. Men should have no more than 2 drinks a day. Women should have no more than 1. For some people, no alcohol is the best choice.   Follow-up care is a key part of your treatment and safety. Be sure to make and go to all appointments, and call your doctor if you are having problems. It's also a good idea to know your test results and keep a list of the medicines you take.  Where can you learn more?  Go to https://www.healthHealth Benefits Direct.net/patientEd and enter U807 to learn more about \"A Healthy Lifestyle: Care Instructions.\"  Current as of: April 30, 2024  Content Version: 14.5  © 8677-8408 [employer] [employer].   Care instructions adapted under license by [employer]. If you have questions about a medical condition or this instruction, always ask your healthcare professional.

## 2025-06-23 DIAGNOSIS — I10 ESSENTIAL (PRIMARY) HYPERTENSION: ICD-10-CM

## 2025-06-23 DIAGNOSIS — I50.9 CHRONIC CONGESTIVE HEART FAILURE, UNSPECIFIED HEART FAILURE TYPE (HCC): ICD-10-CM

## 2025-07-29 DIAGNOSIS — I50.9 CHRONIC CONGESTIVE HEART FAILURE, UNSPECIFIED HEART FAILURE TYPE (HCC): ICD-10-CM

## 2025-07-29 DIAGNOSIS — Z87.11 HISTORY OF GASTRIC ULCER: ICD-10-CM

## 2025-07-29 DIAGNOSIS — D50.9 IRON DEFICIENCY ANEMIA, UNSPECIFIED IRON DEFICIENCY ANEMIA TYPE: ICD-10-CM

## 2025-07-30 RX ORDER — ATORVASTATIN CALCIUM 40 MG/1
40 TABLET, FILM COATED ORAL
Qty: 90 TABLET | Refills: 1 | Status: SHIPPED | OUTPATIENT
Start: 2025-07-30 | End: 2025-08-07 | Stop reason: SDUPTHER

## 2025-07-30 RX ORDER — PANTOPRAZOLE SODIUM 40 MG/1
40 TABLET, DELAYED RELEASE ORAL DAILY
Qty: 90 TABLET | Refills: 1 | Status: SHIPPED | OUTPATIENT
Start: 2025-07-30

## 2025-07-30 RX ORDER — FERROUS SULFATE 325(65) MG
1 TABLET ORAL
Qty: 90 TABLET | Refills: 1 | Status: SHIPPED | OUTPATIENT
Start: 2025-07-30

## 2025-08-07 ENCOUNTER — OFFICE VISIT (OUTPATIENT)
Age: 89
End: 2025-08-07
Payer: MEDICARE

## 2025-08-07 VITALS
DIASTOLIC BLOOD PRESSURE: 72 MMHG | WEIGHT: 144 LBS | HEART RATE: 64 BPM | HEIGHT: 69 IN | BODY MASS INDEX: 21.33 KG/M2 | SYSTOLIC BLOOD PRESSURE: 126 MMHG | OXYGEN SATURATION: 98 %

## 2025-08-07 DIAGNOSIS — N18.30 STAGE 3 CHRONIC KIDNEY DISEASE, UNSPECIFIED WHETHER STAGE 3A OR 3B CKD (HCC): ICD-10-CM

## 2025-08-07 DIAGNOSIS — I50.32 CHRONIC HEART FAILURE WITH PRESERVED EJECTION FRACTION (HCC): ICD-10-CM

## 2025-08-07 DIAGNOSIS — I63.10 CEREBROVASCULAR ACCIDENT (CVA) DUE TO EMBOLISM OF PRECEREBRAL ARTERY (HCC): ICD-10-CM

## 2025-08-07 DIAGNOSIS — E78.5 DYSLIPIDEMIA: ICD-10-CM

## 2025-08-07 DIAGNOSIS — I10 HYPERTENSION, UNSPECIFIED TYPE: ICD-10-CM

## 2025-08-07 DIAGNOSIS — I48.0 PAROXYSMAL ATRIAL FIBRILLATION (HCC): Primary | ICD-10-CM

## 2025-08-07 PROCEDURE — 99214 OFFICE O/P EST MOD 30 MIN: CPT | Performed by: INTERNAL MEDICINE

## 2025-08-07 PROCEDURE — 1123F ACP DISCUSS/DSCN MKR DOCD: CPT | Performed by: INTERNAL MEDICINE

## 2025-08-07 PROCEDURE — 93000 ELECTROCARDIOGRAM COMPLETE: CPT | Performed by: INTERNAL MEDICINE

## 2025-08-07 RX ORDER — ATORVASTATIN CALCIUM 40 MG/1
40 TABLET, FILM COATED ORAL
Qty: 90 TABLET | Refills: 3 | Status: SHIPPED | OUTPATIENT
Start: 2025-08-07

## 2025-08-07 RX ORDER — ASPIRIN 81 MG/1
81 TABLET, CHEWABLE ORAL DAILY
Qty: 90 TABLET | Refills: 3 | Status: SHIPPED | OUTPATIENT
Start: 2025-08-07

## 2025-08-07 RX ORDER — AMLODIPINE BESYLATE 5 MG/1
5 TABLET ORAL DAILY
Qty: 90 TABLET | Refills: 3 | Status: SHIPPED | OUTPATIENT
Start: 2025-08-07

## 2025-08-07 ASSESSMENT — PATIENT HEALTH QUESTIONNAIRE - PHQ9
1. LITTLE INTEREST OR PLEASURE IN DOING THINGS: NOT AT ALL
2. FEELING DOWN, DEPRESSED OR HOPELESS: NOT AT ALL
SUM OF ALL RESPONSES TO PHQ QUESTIONS 1-9: 0

## 2025-08-07 ASSESSMENT — ENCOUNTER SYMPTOMS
ABDOMINAL PAIN: 0
NAUSEA: 0
ABDOMINAL DISTENTION: 0
SHORTNESS OF BREATH: 0
COUGH: 0
SORE THROAT: 0
VOMITING: 0

## (undated) DEVICE — FLEX ADVANTAGE 1500CC: Brand: FLEX ADVANTAGE

## (undated) DEVICE — MEDI-VAC NON-CONDUCTIVE SUCTION TUBING: Brand: CARDINAL HEALTH

## (undated) DEVICE — BITE BLOCK ENDOSCP UNIV AD 6 TO 9.4 MM

## (undated) DEVICE — STERILE POLYISOPRENE POWDER-FREE SURGICAL GLOVES: Brand: PROTEXIS

## (undated) DEVICE — SYR 50ML SLIP TIP NSAF LF STRL --

## (undated) DEVICE — NEEDLE EPI 18GA L3.5IN CLR HUB TUOHY W/ WNG PERIFIX

## (undated) DEVICE — Device: Brand: MEDEX

## (undated) DEVICE — BITE BLK ENDOSCP AD 54FR GRN POLYETH ENDOSCP W STRP SLD

## (undated) DEVICE — SYR 10ML LUER LOK 1/5ML GRAD --

## (undated) DEVICE — SYRINGE MED 25GA 3ML L5/8IN SUBQ PLAS W/ DETACH NDL SFTY

## (undated) DEVICE — CATHETER SUCT TR FL TIP 14FR W/ O CTRL

## (undated) DEVICE — CONTAINER PREFIL FRMLN 15ML --

## (undated) DEVICE — SET EPI 18GA L3.5IN TUOHY NDL W/ 20GA CLS TIP NYL CATH

## (undated) DEVICE — SOLUTION IRRIG 1000ML H2O STRL BLT

## (undated) DEVICE — FCPS RAD JAW 4LC 240CM W/NDL -- BX/20 RADIAL JAW 4

## (undated) DEVICE — FLEX ADVANTAGE 3000CC: Brand: FLEX ADVANTAGE

## (undated) DEVICE — MEDIA CONTRAST 10ML 200MG/ML 41%

## (undated) DEVICE — KENDALL 500 SERIES DIAPHORETIC FOAM MONITORING ELECTRODE - TEAR DROP SHAPE ( 30/PK): Brand: KENDALL

## (undated) DEVICE — (D)BNDG ADHESIVE FABRIC 3/4X3 -- DISC BY MFR USE ITEM 357960

## (undated) DEVICE — AIRLIFE™ NASAL OXYGEN CANNULA CURVED, FLARED TIP WITH 14 FOOT (4.3 M) CRUSH-RESISTANT TUBING, OVER-THE-EAR STYLE: Brand: AIRLIFE™

## (undated) DEVICE — FLUFF AND POLYMER UNDERPAD,EXTRA HEAVY: Brand: WINGS

## (undated) DEVICE — BASIN EMESIS 500CC ROSE 250/CS 60/PLT: Brand: MEDEGEN MEDICAL PRODUCTS, LLC

## (undated) DEVICE — FORCEPS BX L240CM JAW DIA2.8MM L CAP W/ NDL MIC MESH TOOTH

## (undated) DEVICE — (D)GLOVE EXAM LG NITRL NS -- DISC BY MFR NO SUB

## (undated) DEVICE — ENDOSCOPY PUMP TUBING/ CAP SET: Brand: ERBE

## (undated) DEVICE — GAUZE SPONGES,16 PLY: Brand: CURITY

## (undated) DEVICE — MEDI-VAC SUCTION HIGH CAPACITY: Brand: CARDINAL HEALTH

## (undated) DEVICE — KIT COLON W/ 1.1OZ LUB AND 2 END